# Patient Record
Sex: MALE | Race: WHITE | NOT HISPANIC OR LATINO | Employment: FULL TIME | URBAN - METROPOLITAN AREA
[De-identification: names, ages, dates, MRNs, and addresses within clinical notes are randomized per-mention and may not be internally consistent; named-entity substitution may affect disease eponyms.]

---

## 2017-07-19 ENCOUNTER — OFFICE VISIT (OUTPATIENT)
Dept: FAMILY MEDICINE | Facility: CLINIC | Age: 47
End: 2017-07-19
Payer: COMMERCIAL

## 2017-07-19 ENCOUNTER — TELEPHONE (OUTPATIENT)
Dept: FAMILY MEDICINE | Facility: CLINIC | Age: 47
End: 2017-07-19

## 2017-07-19 ENCOUNTER — RADIANT APPOINTMENT (OUTPATIENT)
Dept: GENERAL RADIOLOGY | Facility: CLINIC | Age: 47
End: 2017-07-19
Attending: FAMILY MEDICINE
Payer: COMMERCIAL

## 2017-07-19 VITALS
HEART RATE: 72 BPM | BODY MASS INDEX: 35.35 KG/M2 | WEIGHT: 261 LBS | SYSTOLIC BLOOD PRESSURE: 150 MMHG | DIASTOLIC BLOOD PRESSURE: 95 MMHG | TEMPERATURE: 99.1 F | HEIGHT: 72 IN | OXYGEN SATURATION: 99 %

## 2017-07-19 DIAGNOSIS — E66.01 MORBID OBESITY DUE TO EXCESS CALORIES (H): ICD-10-CM

## 2017-07-19 DIAGNOSIS — I10 ESSENTIAL HYPERTENSION: Primary | ICD-10-CM

## 2017-07-19 DIAGNOSIS — J98.01 BRONCHOSPASM: ICD-10-CM

## 2017-07-19 DIAGNOSIS — J45.30 MILD PERSISTENT ASTHMA WITHOUT COMPLICATION: ICD-10-CM

## 2017-07-19 DIAGNOSIS — R73.9 HYPERGLYCEMIA: ICD-10-CM

## 2017-07-19 LAB
ANION GAP SERPL CALCULATED.3IONS-SCNC: 6 MMOL/L (ref 3–14)
BUN SERPL-MCNC: 25 MG/DL (ref 7–30)
CALCIUM SERPL-MCNC: 8.9 MG/DL (ref 8.5–10.1)
CHLORIDE SERPL-SCNC: 104 MMOL/L (ref 94–109)
CO2 SERPL-SCNC: 27 MMOL/L (ref 20–32)
CREAT SERPL-MCNC: 0.93 MG/DL (ref 0.66–1.25)
GFR SERPL CREATININE-BSD FRML MDRD: 86 ML/MIN/1.7M2
GLUCOSE SERPL-MCNC: 133 MG/DL (ref 70–99)
POTASSIUM SERPL-SCNC: 3.9 MMOL/L (ref 3.4–5.3)
SODIUM SERPL-SCNC: 137 MMOL/L (ref 133–144)

## 2017-07-19 PROCEDURE — 71020 XR CHEST 2 VW: CPT

## 2017-07-19 PROCEDURE — 36415 COLL VENOUS BLD VENIPUNCTURE: CPT | Performed by: FAMILY MEDICINE

## 2017-07-19 PROCEDURE — 99214 OFFICE O/P EST MOD 30 MIN: CPT | Performed by: FAMILY MEDICINE

## 2017-07-19 PROCEDURE — 80048 BASIC METABOLIC PNL TOTAL CA: CPT | Performed by: FAMILY MEDICINE

## 2017-07-19 RX ORDER — LOSARTAN POTASSIUM 50 MG/1
50 TABLET ORAL DAILY
Qty: 90 TABLET | Refills: 1 | Status: SHIPPED | OUTPATIENT
Start: 2017-07-19 | End: 2017-08-22

## 2017-07-19 RX ORDER — ALBUTEROL SULFATE 90 UG/1
2 AEROSOL, METERED RESPIRATORY (INHALATION) EVERY 4 HOURS PRN
Qty: 1 INHALER | Refills: 3 | Status: SHIPPED | OUTPATIENT
Start: 2017-07-19 | End: 2019-01-11

## 2017-07-19 RX ORDER — PREDNISONE 10 MG/1
TABLET ORAL
Qty: 21 TABLET | Refills: 0 | Status: SHIPPED | OUTPATIENT
Start: 2017-07-19 | End: 2017-08-22

## 2017-07-19 NOTE — LETTER
Ilya Villagran  [address] [facility] [address]        July 21, 2017          Dear ,    We are writing to inform you of your test results.    normal kidney function however his glucose was elevated.   When I see him in follow up I will be checking a hemoglobin a1c checking for prediabetes/diabetes.  If he wants to get lab done before hand, I will order this up as future, just let us know.    Resulted Orders   Basic metabolic panel   Result Value Ref Range    Sodium 137 133 - 144 mmol/L    Potassium 3.9 3.4 - 5.3 mmol/L    Chloride 104 94 - 109 mmol/L    Carbon Dioxide 27 20 - 32 mmol/L    Anion Gap 6 3 - 14 mmol/L    Glucose 133 (H) 70 - 99 mg/dL    Urea Nitrogen 25 7 - 30 mg/dL    Creatinine 0.93 0.66 - 1.25 mg/dL    GFR Estimate 86 >60 mL/min/1.7m2      Comment:      Non  GFR Calc    GFR Estimate If Black >90   GFR Calc   >60 mL/min/1.7m2    Calcium 8.9 8.5 - 10.1 mg/dL       Thank you very much for choosing [facility]. Please call my office if you have any questions or concerns.       Sincerely,        Jasson Jacobo MD

## 2017-07-19 NOTE — PROGRESS NOTES
"  SUBJECTIVE:                                                    Ilya Villagarn is a 46 year old male who presents to clinic today for the following health issues:  Chief Complaint   Patient presents with     Breathing Problem     2 months ago had a cold, now is wheezing. Cough with some phlegn, some rhinorrhea     Hypertension     med refill. Has been out of b/p meds fro 6 months         Hypertension Follow-up      Outpatient blood pressures are not being checked.    Low Salt Diet: no added salt    RESPIRATORY SYMPTOMS      Duration: 2 months    Description  cough and wheezing, rhinorrhea    Severity: moderate    Accompanying signs and symptoms: coughing spasms    History (predisposing factors):  Has had testing at the [facility]    Precipitating or alleviating factors: Had a cold    Therapies tried and outcome:  Guaifenesin, dayquil, nightquil. Albuterol        Amount of exercise or physical activity: stays active    Problems taking medications regularly: ran out    Medication side effects: none  Diet: regular (no restrictions)          Problem list and histories reviewed & adjusted, as indicated.  Additional history: as documented        Reviewed and updated as needed this visit by clinical staffTobacco  Meds  Med Hx  Surg Hx  Fam Hx  Soc Hx      Reviewed and updated as needed this visit by Provider         ROS:  CONSTITUTIONAL:NEGATIVE for fever, chills, change in weight  INTEGUMENTARY/SKIN: NEGATIVE for worrisome rashes, moles or lesions  ENT/MOUTH: NEGATIVE for ear, mouth and throat problems  RESP:as above  CV: NEGATIVE for chest pain, palpitations or peripheral edema  MUSCULOSKELETAL: NEGATIVE for significant arthralgias or myalgia  NEURO: NEGATIVE for weakness, dizziness or paresthesias  PSYCHIATRIC: NEGATIVE for changes in mood or affect    OBJECTIVE:                                                    BP (!) 150/95 (Cuff Size: Adult Large)  Pulse 72  Temp 99.1  F (37.3  C) (Tympanic)  Ht 6' 0.25\" " (1.835 m)  Wt 261 lb (118.4 kg)  SpO2 99%   L/min  BMI 35.15 kg/m2  Body mass index is 35.15 kg/(m^2).  GENERAL APPEARANCE: alert, no distress and cooperative  HENT: ear canals and TM's normal and nose and mouth without ulcers or lesions  NECK: no adenopathy, no asymmetry, masses, or scars and thyroid normal to palpation  RESP: decrease lung sounds, end expiratory wheeze noted predicted peak flow is 637.  CV: regular rates and rhythm, normal S1 S2, no S3 or S4 and no murmur, click or rub  MS: extremities normal- no gross deformities noted  SKIN: no suspicious lesions or rashes  NEURO: Normal strength and tone, mentation intact and speech normal  PSYCH: mentation appears normal and affect normal/bright    I have personally reviewed the xray and my interpretation is the following:  CXR is clear       ASSESSMENT/PLAN:                                                    1. Essential hypertension  Not controlled, off med, get lab, and restart medication however starting at lower level of 50 mg a day  - Basic metabolic panel  - losartan (COZAAR) 50 MG tablet; Take 1 tablet (50 mg) by mouth daily  Dispense: 90 tablet; Refill: 1  Also stay active due to weight.  2. Bronchospasm  Has asthma, use oral steroids, advair and albuterol inhaler.  Instructions are given.  - XR Chest 2 Views; Future  - predniSONE (DELTASONE) 10 MG tablet; Take 4 tabs PO Q daily X 2 days, 3 tabs X 2 days, 2 tabs X 2 days, 1 tab X 2 days, 1/2 tab X 2 days.  Dispense: 21 tablet; Refill: 0  - fluticasone-salmeterol (ADVAIR) 250-50 MCG/DOSE diskus inhaler; Inhale 1 puff into the lungs 2 times daily  Dispense: 1 Inhaler; Refill: 11    3. Mild persistent asthma without complication  Instructed and handouts given  - albuterol (PROAIR HFA/PROVENTIL HFA/VENTOLIN HFA) 108 (90 BASE) MCG/ACT Inhaler; Inhale 2 puffs into the lungs every 4 hours as needed for shortness of breath / dyspnea  Dispense: 1 Inhaler; Refill: 3      See Patient  Instructions    Jasson Jacobo MD  [facility]

## 2017-07-19 NOTE — PATIENT INSTRUCTIONS
Please go to lab.    I am checking your kidney function due to high blood pressure.    For your breathing, I am thinking you have asthma.  You have cough, wheezing and decrease airflow.  Your peak flow is also down.  You should be blowing out 637 and you are doing only 500.    I am putting you on a burst and taper of oral prednisone.  I am staring you off on a steroid inhaler. Please use flovent 110 mcg taking 2 puffs twice a day.    Also use the albuterol inhaler as a rescue inhaler. 2 puffs every 4 hours as needed for shortness of breath and wheezing.    Follow up in 10 days.          Thank you for choosing [facility].  You may be receiving a survey in the mail from [employer] regarding your visit today.  Please take a few minutes to complete and return the survey to let us know how we are doing.      If you have questions or concerns, please contact us via [employer] or you can contact your care team at [phone].    Our Clinic hours are:  Monday 6:40 am  to 7:00 pm  Tuesday -Friday 6:40 am to 5:00 pm    The [place] outpatient lab hours are:  Monday - Friday 6:10 am to 4:45 pm  Saturdays 7:00 am to 11:00 am  Appointments are required, call [phone]    If you have clinical questions after hours or would like to schedule an appointment,  call the clinic at [phone].

## 2017-07-19 NOTE — MR AVS SNAPSHOT
After Visit Summary   7/19/2017    Ilya Villagran    MRN: [id number]           Patient Information     Date Of Birth          1970        Visit Information        Provider Department      7/19/2017 3:20 PM Jasson Jacobo MD [facility]        Today's Diagnoses     Essential hypertension    -  1    Bronchospasm        Mild persistent asthma without complication          Care Instructions    Please go to lab.    I am checking your kidney function due to high blood pressure.    For your breathing, I am thinking you have asthma.  You have cough, wheezing and decrease airflow.  Your peak flow is also down.  You should be blowing out 637 and you are doing only 500.    I am putting you on a burst and taper of oral prednisone.  I am staring you off on a steroid inhaler. Please use flovent 110 mcg taking 2 puffs twice a day.    Also use the albuterol inhaler as a rescue inhaler. 2 puffs every 4 hours as needed for shortness of breath and wheezing.    Follow up in 10 days.          Thank you for choosing [facility].  You may be receiving a survey in the mail from [employer] regarding your visit today.  Please take a few minutes to complete and return the survey to let us know how we are doing.      If you have questions or concerns, please contact us via [employer] or you can contact your care team at [phone].    Our Clinic hours are:  Monday 6:40 am  to 7:00 pm  Tuesday -Friday 6:40 am to 5:00 pm    The [place] outpatient lab hours are:  Monday - Friday 6:10 am to 4:45 pm  Saturdays 7:00 am to 11:00 am  Appointments are required, call [phone]    If you have clinical questions after hours or would like to schedule an appointment,  call the clinic at [phone].            Follow-ups after your visit        Follow-up notes from your care team     Return in about 10 days (around 7/29/2017).      Who to contact     If you have questions or need follow up information about  "today's clinic visit or your schedule please contact [facility] directly at [phone].  Normal or non-critical lab and imaging results will be communicated to you by MyChart, letter or phone within 4 business days after the clinic has received the results. If you do not hear from us within 7 days, please contact the clinic through [employer] or phone. If you have a critical or abnormal lab result, we will notify you by phone as soon as possible.  Submit refill requests through [employer] or call your pharmacy and they will forward the refill request to us. Please allow 3 business days for your refill to be completed.          Additional Information About Your Visit        [employer] Information     [employer] lets you send messages to your doctor, view your test results, renew your prescriptions, schedule appointments and more. To sign up, go to [email or web address] . Click on \"Log in\" on the left side of the screen, which will take you to the Welcome page. Then click on \"Sign up Now\" on the right side of the page.     You will be asked to enter the access code listed below, as well as some personal information. Please follow the directions to create your username and password.     Your access code is: [email or web address]  Expires: 10/17/2017  4:11 PM     Your access code will  in 90 days. If you need help or a new code, please call your [place] clinic or [phone].        Care EveryWhere ID     This is your Care EveryWhere ID. This could be used by other organizations to access your [place] medical records  [id number]        Your Vitals Were     Pulse Temperature Height Pulse Oximetry Peak Flow BMI (Body Mass Index)    72 99.1  F (37.3  C) (Tympanic) 6' 0.25\" (1.835 m) 99% 500 L/min 35.15 kg/m2       Blood Pressure from Last 3 Encounters:   17 (!) 150/95   16 158/88   08/27/15 141/82    Weight from Last 3 Encounters:   17 261 lb (118.4 kg)   16 265 lb (120.2 kg)   08/27/15 266 " lb (120.7 kg)              We Performed the Following     Basic metabolic panel          Today's Medication Changes          These changes are accurate as of: 7/19/17  4:11 PM.  If you have any questions, ask your nurse or doctor.               Start taking these medicines.        Dose/Directions    fluticasone-salmeterol 250-50 MCG/DOSE diskus inhaler   Commonly known as:  ADVAIR   Used for:  Bronchospasm   Started by:  Jasson Jacobo MD        Dose:  1 puff   Inhale 1 puff into the lungs 2 times daily   Quantity:  1 Inhaler   Refills:  11       losartan 50 MG tablet   Commonly known as:  COZAAR   Used for:  Essential hypertension   Started by:  Jasson Jacobo MD        Dose:  50 mg   Take 1 tablet (50 mg) by mouth daily   Quantity:  90 tablet   Refills:  1       predniSONE 10 MG tablet   Commonly known as:  DELTASONE   Used for:  Bronchospasm   Started by:  Jasson Jacobo MD        Take 4 tabs PO Q daily X 2 days, 3 tabs X 2 days, 2 tabs X 2 days, 1 tab X 2 days, 1/2 tab X 2 days.   Quantity:  21 tablet   Refills:  0         These medicines have changed or have updated prescriptions.        Dose/Directions    * albuterol 108 (90 BASE) MCG/ACT Inhaler   Commonly known as:  PROAIR HFA/PROVENTIL HFA/VENTOLIN HFA   This may have changed:  Another medication with the same name was added. Make sure you understand how and when to take each.   Used for:  Bronchospasm   Changed by:  Carin Pepe DO        Dose:  2 puff   Inhale 2 puffs into the lungs every 6 hours as needed for shortness of breath / dyspnea or wheezing   Quantity:  1 Inhaler   Refills:  3       * albuterol 108 (90 BASE) MCG/ACT Inhaler   Commonly known as:  PROAIR HFA/PROVENTIL HFA/VENTOLIN HFA   This may have changed:  You were already taking a medication with the same name, and this prescription was added. Make sure you understand how and when to take each.   Used for:  Mild persistent asthma without complication   Changed by:   Jasson Jacobo MD        Dose:  2 puff   Inhale 2 puffs into the lungs every 4 hours as needed for shortness of breath / dyspnea   Quantity:  1 Inhaler   Refills:  3       * Notice:  This list has 2 medication(s) that are the same as other medications prescribed for you. Read the directions carefully, and ask your doctor or other care provider to review them with you.      Stop taking these medicines if you haven't already. Please contact your care team if you have questions.     NO ACTIVE MEDICATIONS   Stopped by:  Jasson Jacobo MD           UNKNOWN TO PATIENT   Stopped by:  Jasson Jacobo MD                Where to get your medicines      These medications were sent to [facility] [address] [facility]  [address] [place] [address]     Phone:  [phone]     albuterol 108 (90 BASE) MCG/ACT Inhaler    fluticasone-salmeterol 250-50 MCG/DOSE diskus inhaler    predniSONE 10 MG tablet         These medications were sent to [facility] [address] [place] [address] [address] [facility] [address]  [address], [place] [address]     Phone:  [phone]     losartan 50 MG tablet                Primary Care Provider Office Phone # Fax #    Carin Lee DO Afshin [phone]       [facility] [address] [facility] [address]        Equal Access to Services     ADI PLATT AH: Hadii aad ku hadasho Soomaali, waaxda luqadaha, qaybta kaalmada adeegyada, william tirado. So [facility] [phone].    ATENCIÓN: Si habla español, tiene a smith disposición servicios gratuitos de asistencia lingüística. Llame al [phone].    We comply with applicable federal civil rights laws and [place] laws. We do not discriminate on the basis of race, color, national origin, age, disability sex, sexual orientation or gender identity.            Thank you!     Thank you for choosing [facility]   for your care. Our goal is always to provide you with excellent care. Hearing back from our patients is one way we can continue to improve our services. Please take a few minutes to complete the written survey that you may receive in the mail after your visit with us. Thank you!             Your Updated Medication List - Protect others around you: Learn how to safely use, store and throw away your medicines at www.disposemymeds.org.          This list is accurate as of: 7/19/17  4:11 PM.  Always use your most recent med list.                   Brand Name Dispense Instructions for use Diagnosis    * albuterol 108 (90 BASE) MCG/ACT Inhaler    PROAIR HFA/PROVENTIL HFA/VENTOLIN HFA    1 Inhaler    Inhale 2 puffs into the lungs every 6 hours as needed for shortness of breath / dyspnea or wheezing    Bronchospasm       * albuterol 108 (90 BASE) MCG/ACT Inhaler    PROAIR HFA/PROVENTIL HFA/VENTOLIN HFA    1 Inhaler    Inhale 2 puffs into the lungs every 4 hours as needed for shortness of breath / dyspnea    Mild persistent asthma without complication       fluticasone-salmeterol 250-50 MCG/DOSE diskus inhaler    ADVAIR    1 Inhaler    Inhale 1 puff into the lungs 2 times daily    Bronchospasm       losartan 50 MG tablet    COZAAR    90 tablet    Take 1 tablet (50 mg) by mouth daily    Essential hypertension       predniSONE 10 MG tablet    DELTASONE    21 tablet    Take 4 tabs PO Q daily X 2 days, 3 tabs X 2 days, 2 tabs X 2 days, 1 tab X 2 days, 1/2 tab X 2 days.    Bronchospasm       * Notice:  This list has 2 medication(s) that are the same as other medications prescribed for you. Read the directions carefully, and ask your doctor or other care provider to review them with you.

## 2017-07-19 NOTE — NURSING NOTE
"Chief Complaint   Patient presents with     Breathing Problem     2 months ago had a cold, now is wheezing. Cough with some phlegn, some rhinorrhea     Hypertension     med refill       Initial BP (!) 168/102 (Cuff Size: Adult Large)  Pulse 72  Temp 99.1  F (37.3  C) (Tympanic)  Ht 6' 0.25\" (1.835 m)  Wt 261 lb (118.4 kg)  SpO2 99%   L/min  BMI 35.15 kg/m2 Estimated body mass index is 35.15 kg/(m^2) as calculated from the following:    Height as of this encounter: 6' 0.25\" (1.835 m).    Weight as of this encounter: 261 lb (118.4 kg).  Medication Reconciliation: complete  "

## 2017-07-20 PROBLEM — E66.01 MORBID OBESITY DUE TO EXCESS CALORIES (H): Status: ACTIVE | Noted: 2017-07-20

## 2017-07-20 PROBLEM — R73.9 HYPERGLYCEMIA: Status: ACTIVE | Noted: 2017-07-20

## 2017-07-20 ASSESSMENT — ASTHMA QUESTIONNAIRES: ACT_TOTALSCORE: 14

## 2017-07-24 PROBLEM — J45.30 MILD PERSISTENT ASTHMA WITHOUT COMPLICATION: Status: ACTIVE | Noted: 2017-07-24

## 2017-08-16 ENCOUNTER — TELEPHONE (OUTPATIENT)
Dept: FAMILY MEDICINE | Facility: CLINIC | Age: 47
End: 2017-08-16

## 2017-08-16 NOTE — TELEPHONE ENCOUNTER
Chief Complaint   Patient presents with     Panel Management     Asthma (ACT was 14 on 7/19/17) and B/P was elevated at 7/19/17, visit needs recheck. He is also to follow up a high glucose with a Hemoglobin A1C, which he has not done yet.     Needs follow up appt for the above problems.     Left message on answer machine to return call to clinic.    I have held the Tues August 22 11:20 slot for this patient to have. Please schedule if he calls back.    KIMBERLY Hardy ([facility])

## 2017-08-17 ENCOUNTER — TELEPHONE (OUTPATIENT)
Dept: FAMILY MEDICINE | Facility: CLINIC | Age: 47
End: 2017-08-17

## 2017-08-22 ENCOUNTER — OFFICE VISIT (OUTPATIENT)
Dept: FAMILY MEDICINE | Facility: CLINIC | Age: 47
End: 2017-08-22
Payer: COMMERCIAL

## 2017-08-22 DIAGNOSIS — J45.30 MILD PERSISTENT ASTHMA WITHOUT COMPLICATION: Primary | ICD-10-CM

## 2017-08-22 DIAGNOSIS — I10 ESSENTIAL HYPERTENSION: ICD-10-CM

## 2017-08-22 DIAGNOSIS — Z13.6 CARDIOVASCULAR SCREENING; LDL GOAL LESS THAN 130: ICD-10-CM

## 2017-08-22 DIAGNOSIS — R73.9 HYPERGLYCEMIA: ICD-10-CM

## 2017-08-22 LAB — HBA1C MFR BLD: 6 % (ref 4.3–6)

## 2017-08-22 PROCEDURE — 99214 OFFICE O/P EST MOD 30 MIN: CPT | Performed by: FAMILY MEDICINE

## 2017-08-22 PROCEDURE — 36415 COLL VENOUS BLD VENIPUNCTURE: CPT | Performed by: FAMILY MEDICINE

## 2017-08-22 PROCEDURE — 83036 HEMOGLOBIN GLYCOSYLATED A1C: CPT | Performed by: FAMILY MEDICINE

## 2017-08-22 RX ORDER — LOSARTAN POTASSIUM 50 MG/1
75 TABLET ORAL DAILY
Qty: 135 TABLET | Refills: 3 | Status: SHIPPED | OUTPATIENT
Start: 2017-08-22 | End: 2018-08-23

## 2017-08-22 NOTE — PROGRESS NOTES
"  SUBJECTIVE:   Ilya Villagran is a 46 year old male who presents to clinic today for the following health issues:  Chief Complaint   Patient presents with     Asthma     follow up, feels no better, still has an \"itch\" in his breathing.  Advair is not helping at all. Initially the prednisone did help. Albuterol does help the wheezing. Nyquil seems to take the \"itch\" away.     Hypertension     recheck     Lab Only     had high blood sugar, needs A1C     Patient is still feeling the sensation of an itch in his chest area and throat.  The oral steroid helped but symptoms came back.  He also tried the steroid inhaler but it did not seem to help.  He states that nyquil at night helps him sleep and get rid of the sensation.  This was going on before he started the ARB medication.    Blood pressure is not controlled, on medication.  Started at a lower dose, discussed it may need to be titrated.  No side effect of his medication.      Hypertension Follow-up      Outpatient blood pressures are not being checked.    Low Salt Diet: not monitoring salt    Asthma Follow-Up    Was ACT completed today?    Yes    ACT Total Scores 8/22/2017   ACT TOTAL SCORE (Goal Greater than or Equal to 20) 18   In the past 12 months, how many times did you visit the emergency room for your asthma without being admitted to the hospital? 0   In the past 12 months, how many times were you hospitalized overnight because of your asthma? 0       Recent asthma triggers that patient is dealing with: at work, fumes        Amount of exercise or physical activity: 2-3 days/week fProblems taking medications regularly: No    Medication side effects: none  Diet: regular (no restrictions)          Problem list and histories reviewed & adjusted, as indicated.  Additional history: as documented        Reviewed and updated as needed this visit by clinical staffAllergies  Meds  Problems       Reviewed and updated as needed this visit by Provider  Allergies  Meds " " Problems         ROS:  CONSTITUTIONAL:NEGATIVE for fever, chills, change in weight  EYES: NEGATIVE for vision changes or irritation  ENT/MOUTH: NEGATIVE for ear, mouth and throat problems  RESP:still has similar sensation, got better then worse again after oral steroid completed  CV: NEGATIVE for chest pain, palpitations or peripheral edema  MUSCULOSKELETAL: NEGATIVE for significant arthralgias or myalgia  NEURO: NEGATIVE for weakness, dizziness or paresthesias  PSYCHIATRIC: NEGATIVE for changes in mood or affect    OBJECTIVE:                                                    /84 (Cuff Size: Adult Large)  Temp 98.8  F (37.1  C) (Tympanic)  Ht 6' 0.25\" (1.835 m)  Wt 262 lb (118.8 kg)  SpO2 97%   L/min  BMI 35.29 kg/m2  Body mass index is 35.29 kg/(m^2).  GENERAL APPEARANCE: alert, no distress and cooperative  RESP: lungs clear to auscultation - no rales, rhonchi or wheezes  CV: regular rates and rhythm, normal S1 S2, no S3 or S4 and no murmur, click or rub  SKIN: no suspicious lesions or rashes  NEURO: Normal strength and tone, mentation intact and speech normal  PSYCH: mentation appears normal and affect normal/bright         ASSESSMENT/PLAN:                                                    1. Mild persistent asthma without complication  Seems to have itch as symptom, will get allergy/asthma consult due to ongoing symptoms even with advair  - ALLERGY/ASTHMA ADULT REFERRAL    2. Essential hypertension  controlled  - losartan (COZAAR) 50 MG tablet; Take 1.5 tablets (75 mg) by mouth daily This is a new dose as of today.  Dispense: 135 tablet; Refill: 3    3. CARDIOVASCULAR SCREENING; LDL GOAL LESS THAN 130    - Lipid panel reflex to direct LDL; Future    4. Hyperglycemia    - Hemoglobin A1c      See Patient Instructions    Jasson Jacobo MD  [facility]  "

## 2017-08-22 NOTE — PATIENT INSTRUCTIONS
Please go to lab.    Please make a fasting lab visit in the future.    Please the asthma/allergy specialist here.    Please increase your losartan medication to 75 mg a day which is 1.5 tablets of the 50 mg dose.    Please make sure [facility] fills for the correct dose and quantity.          Thank you for choosing [facility].  You may be receiving a survey in the mail from "[employer] [facility] regarding your visit today.  Please take a few minutes to complete and return the survey to let us know how we are doing.      If you have questions or concerns, please contact us via [employer] or you can contact your care team at [phone].    Our Clinic hours are:  Monday 6:40 am  to 7:00 pm  Tuesday -Friday 6:40 am to 5:00 pm    The [place] outpatient lab hours are:  Monday - Friday 6:10 am to 4:45 pm  Saturdays 7:00 am to 11:00 am  Appointments are required, call [phone]    If you have clinical questions after hours or would like to schedule an appointment,  call the clinic at [phone].

## 2017-08-22 NOTE — LETTER
August 28, 2017      Ilya Villagran  [address] [facility] [address]        Dear ,    We are writing to inform you of your test results.  His hemoglobin a1c is in the prediabetic range.  Please give him information on prediabetic classes ( this info is enclosed).  This is the time to learn how to eat and what not to eat to help prevent diabetes.  If you have any questions or concerns, please call the clinic at the number listed above.     Component      Latest Ref Rng & Units 8/22/2017   Hemoglobin A1C      4.3 - 6.0 % 6.0     Sincerely,        Jasson Jacobo MD

## 2017-08-22 NOTE — MR AVS SNAPSHOT
After Visit Summary   8/22/2017    Ilya Villagran    MRN: [id number]           Patient Information     Date Of Birth          1970        Visit Information        Provider Department      8/22/2017 11:20 AM Jasson Jacobo MD [facility]        Today's Diagnoses     Mild persistent asthma without complication    -  1    Essential hypertension        CARDIOVASCULAR SCREENING; LDL GOAL LESS THAN 130        Hyperglycemia          Care Instructions    Please go to lab.    Please make a fasting lab visit in the future.    Please the asthma/allergy specialist here.    Please increase your losartan medication to 75 mg a day which is 1.5 tablets of the 50 mg dose.    Please make sure [facility] fills for the correct dose and quantity.          Thank you for choosing [facility].  You may be receiving a survey in the mail from [employer] regarding your visit today.  Please take a few minutes to complete and return the survey to let us know how we are doing.      If you have questions or concerns, please contact us via [facility] or you can contact your care team at [phone].    Our Clinic hours are:  Monday 6:40 am  to 7:00 pm  Tuesday -Friday 6:40 am to 5:00 pm    The [place] outpatient lab hours are:  Monday - Friday 6:10 am to 4:45 pm  Saturdays 7:00 am to 11:00 am  Appointments are required, call [phone]    If you have clinical questions after hours or would like to schedule an appointment,  call the clinic at [phone].            Follow-ups after your visit        Additional Services     ALLERGY/ASTHMA ADULT REFERRAL       Your provider has referred you to: [facility]: [facility] [phone] [email or web address]    Please be aware that coverage of these services is subject to the terms and limitations of your health insurance plan.  Call member services at your health plan with any benefit or coverage questions.      Please bring  the following with you to your appointment:    (1) Any X-Rays, CTs or MRIs which have been performed.  Contact the facility where they were done to arrange for  prior to your scheduled appointment.    (2) List of current medications  (3) This referral request   (4) Any documents/labs given to you for this referral    Patient has 'itch' in his throat, substernal.  His predicted peak flow is around 640, first time I saw him it was 500, after advair/albuterol it is about 540.  Still having symptoms.  He does not think advair is working.  Please consult eval and treat.                  Your next 10 appointments already scheduled     Aug 25, 2017 11:00 AM CDT   Office Visit with [facility]ass[facility] MD   [facility]    [address] [address] [phone]           Bring a current list of meds and any records pertaining to this visit. For Physicals, please bring immunization records and any forms needing to be filled out. Please arrive 10 minutes early to complete paperwork.              Future tests that were ordered for you today     Open Future Orders        Priority Expected Expires Ordered    Lipid panel reflex to direct LDL Routine  9/22/2017 8/22/2017            Who to contact     If you have questions or need follow up information about today's clinic visit or your schedule please contact [facility] directly at [phone].  Normal or non-critical lab and imaging results will be communicated to you by MyChart, letter or phone within 4 business days after the clinic has received the results. If you do not hear from us within 7 days, please contact the clinic through MyChart or phone. If you have a critical or abnormal lab result, we will notify you by phone as soon as possible.  Submit refill requests through [employer] or call your pharmacy and they will forward the refill request to us. Please allow 3 business days for your refill  "to be completed.          Additional Information About Your Visit        [employer] Information     [employer] lets you send messages to your doctor, view your test results, renew your prescriptions, schedule appointments and more. To sign up, go to [email or web address] . Click on \"Log in\" on the left side of the screen, which will take you to the Welcome page. Then click on \"Sign up Now\" on the right side of the page.     You will be asked to enter the access code listed below, as well as some personal information. Please follow the directions to create your username and password.     Your access code is: [email or web address]  Expires: 10/17/2017  4:11 PM     Your access code will  in 90 days. If you need help or a new code, please call your [place] clinic or [phone].        Care EveryWhere ID     This is your Care EveryWhere ID. This could be used by other organizations to access your [place] medical records  [id number]        Your Vitals Were     Temperature Height Pulse Oximetry Peak Flow BMI (Body Mass Index)       98.8  F (37.1  C) (Tympanic) 6' 0.25\" (1.835 m) 97% 540 L/min 35.29 kg/m2        Blood Pressure from Last 3 Encounters:   17 148/84   17 (!) 150/95   16 158/88    Weight from Last 3 Encounters:   17 262 lb (118.8 kg)   17 261 lb (118.4 kg)   16 265 lb (120.2 kg)              We Performed the Following     ALLERGY/ASTHMA ADULT REFERRAL     Hemoglobin A1c          Today's Medication Changes          These changes are accurate as of: 17 12:13 PM.  If you have any questions, ask your nurse or doctor.               These medicines have changed or have updated prescriptions.        Dose/Directions    losartan 50 MG tablet   Commonly known as:  COZAAR   This may have changed:    - how much to take  - additional instructions   Used for:  Essential hypertension        Dose:  75 mg   Take 1.5 tablets (75 mg) by mouth daily This is a new dose as of today.   Quantity: "  135 tablet   Refills:  3            Where to get your medicines      These medications were sent to [facility]  [place], [place] - [address] AT [facility] [address] [place] [address]     Phone:  [phone]     losartan 50 MG tablet                Primary Care Provider Office Phone # Fax #    Carin Pepe,  [phone] [address] [facility] [address]        Equal Access to Services     [facility] GIULIA : Hadii aad ku hadasho Soomaali, waaxda luqadaha, qaybta kaalmada adeegyada, waxay liin hayaan adeavila diamond . So [facility] [phone].    ATENCIÓN: Si habla español, tiene a smith disposición servicios gratuitos de asistencia lingüística. [facility] [phone].    We comply with applicable federal civil rights laws and [place] laws. We do not discriminate on the basis of race, color, national origin, age, disability sex, sexual orientation or gender identity.            Thank you!     Thank you for choosing [facility]  for your care. Our goal is always to provide you with excellent care. Hearing back from our patients is one way we can continue to improve our services. Please take a few minutes to complete the written survey that you may receive in the mail after your visit with us. Thank you!             Your Updated Medication List - Protect others around you: Learn how to safely use, store and throw away your medicines at www.disposemymeds.org.          This list is accurate as of: 8/22/17 12:13 PM.  Always use your most recent med list.                   Brand Name Dispense Instructions for use Diagnosis    albuterol 108 (90 BASE) MCG/ACT Inhaler    PROAIR HFA/PROVENTIL HFA/VENTOLIN HFA    1 Inhaler    Inhale 2 puffs into the lungs every 4 hours as needed for shortness of breath / dyspnea    Mild persistent asthma without complication       fluticasone-salmeterol 250-50 MCG/DOSE diskus inhaler    ADVAIR     1 Inhaler    Inhale 1 puff into the lungs 2 times daily    Bronchospasm       losartan 50 MG tablet    COZAAR    135 tablet    Take 1.5 tablets (75 mg) by mouth daily This is a new dose as of today.    Essential hypertension

## 2017-08-23 ASSESSMENT — ASTHMA QUESTIONNAIRES: ACT_TOTALSCORE: 18

## 2017-08-25 VITALS
OXYGEN SATURATION: 97 % | SYSTOLIC BLOOD PRESSURE: 148 MMHG | HEIGHT: 72 IN | BODY MASS INDEX: 35.49 KG/M2 | TEMPERATURE: 98.8 F | DIASTOLIC BLOOD PRESSURE: 84 MMHG | WEIGHT: 262 LBS

## 2017-08-28 ENCOUNTER — OFFICE VISIT (OUTPATIENT)
Dept: ALLERGY | Facility: CLINIC | Age: 47
End: 2017-08-28
Payer: COMMERCIAL

## 2017-08-28 VITALS
TEMPERATURE: 97.5 F | HEART RATE: 87 BPM | WEIGHT: 259.26 LBS | DIASTOLIC BLOOD PRESSURE: 95 MMHG | SYSTOLIC BLOOD PRESSURE: 134 MMHG | OXYGEN SATURATION: 98 % | BODY MASS INDEX: 34.92 KG/M2

## 2017-08-28 DIAGNOSIS — R06.2 WHEEZING: Primary | ICD-10-CM

## 2017-08-28 DIAGNOSIS — R05.9 COUGH: ICD-10-CM

## 2017-08-28 LAB
FEF 25/75: NORMAL
FEV-1: NORMAL
FEV1/FVC: NORMAL
FVC: NORMAL

## 2017-08-28 PROCEDURE — 94010 BREATHING CAPACITY TEST: CPT | Performed by: ALLERGY & IMMUNOLOGY

## 2017-08-28 PROCEDURE — 95004 PERQ TESTS W/ALRGNC XTRCS: CPT | Performed by: ALLERGY & IMMUNOLOGY

## 2017-08-28 PROCEDURE — 99244 OFF/OP CNSLTJ NEW/EST MOD 40: CPT | Mod: 25 | Performed by: ALLERGY & IMMUNOLOGY

## 2017-08-28 RX ORDER — AZELASTINE 1 MG/ML
2 SPRAY, METERED NASAL 2 TIMES DAILY PRN
Qty: 30 ML | Refills: 3 | Status: SHIPPED | OUTPATIENT
Start: 2017-08-28 | End: 2020-11-30

## 2017-08-28 ASSESSMENT — ENCOUNTER SYMPTOMS
WHEEZING: 1
EYE REDNESS: 0
RHINORRHEA: 0
MYALGIAS: 0
STRIDOR: 0
SINUS PRESSURE: 0
ADENOPATHY: 0
CHEST TIGHTNESS: 1
VOMITING: 0
FATIGUE: 0
EYE DISCHARGE: 0
FEVER: 0
HEADACHES: 0
NAUSEA: 0
ROS SKIN COMMENTS: ECZEMA
EYE ITCHING: 0
FACIAL SWELLING: 0
DIARRHEA: 0
SHORTNESS OF BREATH: 1
ACTIVITY CHANGE: 0
COUGH: 1

## 2017-08-28 NOTE — PATIENT INSTRUCTIONS
-Use azelastine 2 sprays in each nostril twice a day when necessary.    Call to schedule methacholine challenge test:      [phone]

## 2017-08-28 NOTE — NURSING NOTE
"Chief Complaint   Patient presents with     Asthma Consult     ref- Dr. Jacobo       Initial BP (!) 134/95 (BP Location: Left arm, Patient Position: Sitting, Cuff Size: Adult Large)  Pulse 87  Temp 97.5  F (36.4  C) (Oral)  Wt 259 lb 4.2 oz (117.6 kg)  SpO2 98%  BMI 34.92 kg/m2 Estimated body mass index is 34.92 kg/(m^2) as calculated from the following:    Height as of 8/22/17: 6' 0.25\" (1.835 m).    Weight as of this encounter: 259 lb 4.2 oz (117.6 kg).  Medication Reconciliation: complete    "

## 2017-08-28 NOTE — MR AVS SNAPSHOT
"              After Visit Summary   8/28/2017    Ilya Villagran    MRN: [id number]           Patient Information     Date Of Birth          1970        Visit Information        Provider Department      8/28/2017 5:00 PM Sagar Sethi MD [facility]        Today's Diagnoses     Wheezing    -  1    Cough          Care Instructions    -Use azelastine 2 sprays in each nostril twice a day when necessary.    Call to schedule methacholine challenge test:    [phone]          Follow-ups after your visit        Future tests that were ordered for you today     Open Future Orders        Priority Expected Expires Ordered    General PFT Lab (Please always keep checked) Routine  8/28/2018 8/28/2017    Pulmonary Function Test Routine  8/28/2018 8/28/2017            Who to contact     If you have questions or need follow up information about today's clinic visit or your schedule please contact [facility] directly at [phone].  Normal or non-critical lab and imaging results will be communicated to you by [employer], letter or phone within 4 business days after the clinic has received the results. If you do not hear from us within 7 days, please contact the clinic through [employer] or phone. If you have a critical or abnormal lab result, we will notify you by phone as soon as possible.  Submit refill requests through [employer] or call your pharmacy and they will forward the refill request to us. Please allow 3 business days for your refill to be completed.          Additional Information About Your Visit        [employer] Information     [employer] lets you send messages to your doctor, view your test results, renew your prescriptions, schedule appointments and more. To sign up, go to [email or web address] . Click on \"Log in\" on the left side of the screen, which will take you to the Welcome page. Then click on \"Sign up Now\" on the right side of the page.     You will be asked to enter the access code " listed below, as well as some personal information. Please follow the directions to create your username and password.     Your access code is: [email or web address]  Expires: 10/17/2017  4:11 PM     Your access code will  in 90 days. If you need help or a new code, please call your [place] clinic or [phone].        Care EveryWhere ID     This is your Care EveryWhere ID. This could be used by other organizations to access your [place] medical records  [id number]        Your Vitals Were     Pulse Temperature Pulse Oximetry BMI (Body Mass Index)          87 97.5  F (36.4  C) (Oral) 98% 34.92 kg/m2         Blood Pressure from Last 3 Encounters:   17 (!) 134/95   17 148/84   17 (!) 150/95    Weight from Last 3 Encounters:   17 259 lb 4.2 oz (117.6 kg)   17 262 lb (118.8 kg)   17 261 lb (118.4 kg)              We Performed the Following     ALLERGY SKIN TESTS,ALLERGENS     Spirometry, Breathing Capacity          Today's Medication Changes          These changes are accurate as of: 17  6:59 PM.  If you have any questions, ask your nurse or doctor.               Start taking these medicines.        Dose/Directions    azelastine 0.1 % spray   Commonly known as:  ASTELIN   Used for:  Cough   Started by:  Sagar Sethi MD        Dose:  2 spray   Spray 2 sprays into both nostrils 2 times daily as needed   Quantity:  30 mL   Refills:  3            Where to get your medicines      These medications were sent to [facility] [id number] [place], [place] [address], [place] - [address] AT [facility] [address], [place] [address]     Phone:  [phone]     azelastine 0.1 % spray                Primary Care Provider Office Phone # Fax #    Carin Newmannorm Pepe [phone] [address] [facility] [address]        Equal Access to Services     ADI PLATT AH: Sundeep Bautista, guanaco delarosa, william headley  zahra ashrafmiranda la'aan ah. So [facility] [phone].    ATENCIÓN: Si habla cecilia, tiene a smith disposición servicios gratuitos de asistencia lingüística. Viry al [phone].    We comply with applicable federal civil rights laws and [place] laws. We do not discriminate on the basis of race, color, national origin, age, disability sex, sexual orientation or gender identity.            Thank you!     Thank you for choosing [facility]  for your care. Our goal is always to provide you with excellent care. Hearing back from our patients is one way we can continue to improve our services. Please take a few minutes to complete the written survey that you may receive in the mail after your visit with us. Thank you!             Your Updated Medication List - Protect others around you: Learn how to safely use, store and throw away your medicines at www.disposemymeds.org.          This list is accurate as of: 8/28/17  6:59 PM.  Always use your most recent med list.                   Brand Name Dispense Instructions for use Diagnosis    albuterol 108 (90 BASE) MCG/ACT Inhaler    PROAIR HFA/PROVENTIL HFA/VENTOLIN HFA    1 Inhaler    Inhale 2 puffs into the lungs every 4 hours as needed for shortness of breath / dyspnea    Mild persistent asthma without complication       azelastine 0.1 % spray    ASTELIN    30 mL    Spray 2 sprays into both nostrils 2 times daily as needed    Cough       fluticasone-salmeterol 250-50 MCG/DOSE diskus inhaler    ADVAIR    1 Inhaler    Inhale 1 puff into the lungs 2 times daily    Bronchospasm       losartan 50 MG tablet    COZAAR    135 tablet    Take 1.5 tablets (75 mg) by mouth daily This is a new dose as of today.    Essential hypertension

## 2017-08-28 NOTE — NURSING NOTE
Per provider verbal order, RN placed Adult Environmental scratch testing panels.  Consent was obtained prior to procedure.  Once panels were placed, patient was monitored for 15 minutes in clinic.  RN read test after 15 minutes and provider was notified of results.  Pt tolerated procedure well.  All questions and concerns were addressed at office visit.     Sarah Becker RN

## 2017-08-28 NOTE — PROGRESS NOTES
SUBJECTIVE:                                                                   Ilya Villagran presents today to our Allergy Clinic at [facility]; he is being seen in consultation at the request of Dr. Jacobo.  As you know, he is a 46 year old male with history of asthma, obesity and hypertension.  For multiple years, he has been having some problems with wheezing. He describes wheezing as a whistling sound on expiration (frequently) and inspiration is well. Could be getting worse if he mows the lawn, and sometimes when he is talking too much on the phone. It is associated frequently with nonproductive cough. At the same time, he states that he feels some phlegm on occasions.  He feels that the cough and wheezing are treated by an itch in his upper chest.  In general, he does not feel that albuterol is helpful. He thought it was helpful in the past, but not for the last several months.  At some point, ears ago, he was on Dulera and still had symptoms.  About a month ago, he was started on Advair 230/21 mcg 2 puffs twice a day, and he did not find it effective either.  He feels that NyQuil is helpful. He tried cetirizine and did not find it beneficial.  Oral steroid last month was partially helpful. In the past, he thought that it helped more.   He is not sure if he is having shortness of breath per se. Denies chest tightness.  He saw Pulmonology in 2014, and was prescribed omeprazole for occult GERD and was recommended to continue Dulera, with recommendations to get methacholine challenge test done in he continues having symptoms. He does have a history of coughing exacerbated by lisinopril. He is currently on losartan. The patient states that in between being on lisinopril and losartan, he had symptoms for years.    In 2015, he had normal spirometry. At that time methacholine challenge test was suggested. He is not sure when it was done or not. I do not see it in Epic. He had normal chest x-ray in  July 2017.  He does have a history of some watery eyes. Allergy drops were not helpful for that, but plain lubricating eyedrops are very helpful. He may have some intermittent mild nasal congestion. Cetirizine did not seem to be helpful for it. He prefers to just below his nose. He has not tried any nasal corticosteroids or any other nasal sprays.  There is no history of ear tubes or sinus interventions.     Patient Active Problem List   Diagnosis     Bronchospasm     Essential hypertension     Hyperglycemia     Morbid obesity due to excess calories (H)     Mild persistent asthma without complication       History reviewed. No pertinent past medical history.   Problem (# of Occurrences) Relation (Name,Age of Onset)    Asthma (1) Mother    DIABETES (1) Mother    Depression (1) Brother    Hypertension (1) Cousin    Myocardial Infarction (1) Brother        Past Surgical History:   Procedure Laterality Date     LAPAROSCOPIC APPENDECTOMY  12/18/2011    Procedure:LAPAROSCOPIC APPENDECTOMY; Surgeon:JEAN CLAUDE LUNA; Location:[place] OR     Social History     Social History     Marital status:      Spouse name: N/A     Number of children: N/A     Years of education: N/A     Occupational History     /customer service [employer]     Social History Main Topics     Smoking status: Never Smoker     Smokeless tobacco: Never Used     Alcohol use Yes      Comment: 12 pack per week     Drug use: No     Sexual activity: Not Asked     Other Topics Concern     Parent/Sibling W/ Cabg, Mi Or Angioplasty Before 65f 55m? No     Social History Narrative    August 28, 2017    ENVIRONMENTAL HISTORY: [place] The home is heated with a forced air. They do have central air conditioning. The patient's bedroom is furnished with carpeting in bedroom and fabric window coverings.  Pets inside the house include 2 cats, 1 dog and 1 piglet. There is not history of cockroach or  mice infestation. There is 1 smoker in the house, only smokes outside.  The house does not have a damp basement.                Review of Systems   Constitutional: Negative for activity change, fatigue and fever.   HENT: Negative for congestion, ear pain, facial swelling, nosebleeds, postnasal drip, rhinorrhea, sinus pressure and sneezing.    Eyes: Negative for discharge, redness and itching.        Watering of eyes   Respiratory: Positive for cough, chest tightness, shortness of breath and wheezing. Negative for stridor.    Gastrointestinal: Negative for diarrhea, nausea and vomiting.   Musculoskeletal: Negative for myalgias.   Skin: Negative for rash.        eczema   Neurological: Negative for headaches.   Hematological: Negative for adenopathy.   Psychiatric/Behavioral: Negative for behavioral problems and self-injury.          Current Outpatient Prescriptions:      azelastine (ASTELIN) 0.1 % spray, Spray 2 sprays into both nostrils 2 times daily as needed, Disp: 30 mL, Rfl: 3     losartan (COZAAR) 50 MG tablet, Take 1.5 tablets (75 mg) by mouth daily This is a new dose as of today., Disp: 135 tablet, Rfl: 3     fluticasone-salmeterol (ADVAIR) 250-50 MCG/DOSE diskus inhaler, Inhale 1 puff into the lungs 2 times daily, Disp: 1 Inhaler, Rfl: 11     albuterol (PROAIR HFA/PROVENTIL HFA/VENTOLIN HFA) 108 (90 BASE) MCG/ACT Inhaler, Inhale 2 puffs into the lungs every 4 hours as needed for shortness of breath / dyspnea, Disp: 1 Inhaler, Rfl: 3    There is no immunization history on file for this patient.  Allergies   Allergen Reactions     Ace Inhibitors Cough     OBJECTIVE:                                                                 BP (!) 134/95 (BP Location: Left arm, Patient Position: Sitting, Cuff Size: Adult Large)  Pulse 87  Temp 97.5  F (36.4  C) (Oral)  Wt 259 lb 4.2 oz (117.6 kg)  SpO2 98%  BMI 34.92 kg/m2        Physical Exam   Constitutional: He is oriented to person, place, and time. No distress.    HENT:   Head: Normocephalic and atraumatic.   Right Ear: Tympanic membrane, external ear and ear canal normal.   Left Ear: Tympanic membrane, external ear and ear canal normal.   Nose: No mucosal edema or rhinorrhea.   Eyes: Conjunctivae are normal. Right eye exhibits no discharge. Left eye exhibits no discharge.   Neck: Normal range of motion.   Cardiovascular: Normal rate, regular rhythm and normal heart sounds.    No murmur heard.  Pulmonary/Chest: Effort normal and breath sounds normal. No respiratory distress. He has no wheezes. He has no rales.   Musculoskeletal: Normal range of motion.   Neurological: He is alert and oriented to person, place, and time.   Skin: Skin is warm. He is not diaphoretic.   Psychiatric: Affect normal.   Nursing note and vitals reviewed.            WORKUP:  SPIROMETRY       FVC 5.34L (98% of predicted).     FEV1 4.25L (99% of predicted).     FEV1/FVC 80%     FEF 25%-75%  4.03L/s (103% of predicted)  The office spirometry performed today doesn't suggest an obstruction.      Asthma Control Test (ACT) total score: 21       Percutaneous skin puncture testing for aeroallergens performed today (August 28, 2017) was negative with appropriate responses to positive and negative controls. See scanned testing sheet for more details.      ASSESSMENT/PLAN:      Visit Diagnoses     1. Wheezing    -  Primary  Chronic cough and wheezing, differential involves postnasal drainage, silent gastroesophageal reflux, paradoxical vocal cord motion, asthma and hypersensitive larynx.  Considering lack of sensitivity for seasonal/environmental allergens, and normal office spirometry, I recommend methacholine challenge test.  Depending on the results, may consider referral to voice clinic for evaluation of vocal cord dysfunction, and then ENT eval, and/or Pulmonology reevaluation.            Other Relevant Orders    Spirometry, Breathing Capacity (Completed)    ALLERGY SKIN TESTS,ALLERGENS (Completed)     General PFT Lab (Please always keep checked)    Pulmonary Function Test    2. Cough      Since he states that NyQuil improves his cough, I wonder how much postnasal drainage has to do in pathophysiology of his symptoms, though cetirizine did not seem to be helpful.   -Recommend a trial of azelastine 2 sprays in each nostril twice a day as needed.      Relevant Medications    azelastine (ASTELIN) 0.1 % spray    Other Relevant Orders    Pulmonary Function Test            Follow-up depending on the results of methacholine challenge test.    Thank you for allowing us to participate in the care of this patient. Please feel free to contact us if there are any questions or concerns about the patient.    Disclaimer: This note consists of symbols derived from keyboarding, dictation and/or voice recognition software. As a result, there may be errors in the script that have gone undetected. Please consider this when interpreting information found in this chart.    Sagar Sethi MD, FACI  Allergy, Asthma and Immunology  [place], [place] and [place]

## 2017-08-29 ASSESSMENT — ASTHMA QUESTIONNAIRES: ACT_TOTALSCORE: 21

## 2017-09-25 ENCOUNTER — HOSPITAL ENCOUNTER (OUTPATIENT)
Dept: RESPIRATORY THERAPY | Facility: CLINIC | Age: 47
Discharge: HOME | End: 2017-09-25
Attending: INTERNAL MEDICINE | Admitting: INTERNAL MEDICINE
Payer: COMMERCIAL

## 2017-09-25 DIAGNOSIS — R05.9 COUGH: ICD-10-CM

## 2017-09-25 DIAGNOSIS — R06.2 WHEEZING: ICD-10-CM

## 2017-09-25 LAB
EXPTIME-%CHANGE-CHLG: 14 %
EXPTIME-CHLG: 6.9 SEC
EXPTIME-PRE: 6.02 SEC
FEF2575-%CHANGE-CHLG: -47 %
FEF2575-%PRED-CHLG: 70 %
FEF2575-%PRED-POST: 115 %
FEF2575-%PRED-PRE: 134 %
FEF2575-POST: 4.5 L/SEC
FEF2575-PRE: 5.24 L/SEC
FEF2575-PRED: 3.88 L/SEC
FEFMAX-%PRED-PRE: 90 %
FEFMAX-PRE: 9.37 L/SEC
FEFMAX-PRED: 10.39 L/SEC
FEV1-%PRED-PRE: 103 %
FEV1-PRE: 4.41 L
FEV1FEV6-PRE: 85 %
FEV1FEV6-PRED: 81 %
FEV1FVC-PRE: 85 %
FEV1FVC-PRED: 79 %
FIFMAX-%CHANGE-CHLG: -7 %
FIFMAX-CHLG: 6.32 L/SEC
FIFMAX-PRE: 6.85 L/SEC
FVC-%PRED-PRE: 96 %
FVC-PRE: 5.2 L
FVC-PRED: 5.41 L
HGB BLD-MCNC: 14.6 G/DL (ref 13.3–17.7)

## 2017-09-25 PROCEDURE — 94070 EVALUATION OF WHEEZING: CPT | Mod: 26 | Performed by: INTERNAL MEDICINE

## 2017-09-25 PROCEDURE — 95070 INHLJ BRNCL CHALLENGE TSTG: CPT

## 2017-09-25 PROCEDURE — 94726 PLETHYSMOGRAPHY LUNG VOLUMES: CPT | Mod: 26 | Performed by: INTERNAL MEDICINE

## 2017-09-25 PROCEDURE — 94070 EVALUATION OF WHEEZING: CPT

## 2017-09-25 PROCEDURE — 36415 COLL VENOUS BLD VENIPUNCTURE: CPT | Performed by: ALLERGY & IMMUNOLOGY

## 2017-09-25 PROCEDURE — 85018 HEMOGLOBIN: CPT | Performed by: ALLERGY & IMMUNOLOGY

## 2017-09-25 PROCEDURE — 94726 PLETHYSMOGRAPHY LUNG VOLUMES: CPT

## 2017-09-25 PROCEDURE — 25000125 ZZHC RX 250: Performed by: ALLERGY & IMMUNOLOGY

## 2017-09-25 PROCEDURE — 25000308 HC RX OP HPI UCR WEL MED 250 IP 250: Performed by: ALLERGY & IMMUNOLOGY

## 2017-09-25 PROCEDURE — 94729 DIFFUSING CAPACITY: CPT | Mod: 26 | Performed by: INTERNAL MEDICINE

## 2017-09-25 PROCEDURE — 94729 DIFFUSING CAPACITY: CPT

## 2017-09-25 RX ORDER — ALBUTEROL SULFATE 0.83 MG/ML
2.5 SOLUTION RESPIRATORY (INHALATION) ONCE
Status: COMPLETED | OUTPATIENT
Start: 2017-09-25 | End: 2017-09-25

## 2017-09-25 RX ADMIN — ALBUTEROL SULFATE 2.5 MG: 2.5 SOLUTION RESPIRATORY (INHALATION) at 18:57

## 2017-09-25 RX ADMIN — METHACHOLINE CHLORIDE 100 MG: 100 POWDER, FOR SOLUTION RESPIRATORY (INHALATION) at 17:30

## 2017-10-06 LAB
DLCOCOR-%PRED-PRE: 115 %
DLCOCOR-PRE: 37.06 ML/MIN/MMHG
DLCOUNC-%PRED-PRE: 115 %
DLCOUNC-PRE: 37.06 ML/MIN/MMHG
DLCOUNC-PRED: 32.04 ML/MIN/MMHG
ERV-%PRED-PRE: 83 %
ERV-PRE: 0.94 L
ERV-PRED: 1.13 L
EXPTIME-PRE: 7.27 SEC
FEF2575-%PRED-PRE: 117 %
FEF2575-PRE: 4.56 L/SEC
FEF2575-PRED: 3.88 L/SEC
FEFMAX-%PRED-PRE: 80 %
FEFMAX-PRE: 8.4 L/SEC
FEFMAX-PRED: 10.39 L/SEC
FEV1-%PRED-PRE: 101 %
FEV1-PRE: 4.34 L
FEV1FEV6-PRE: 82 %
FEV1FEV6-PRED: 81 %
FEV1FVC-PRE: 82 %
FEV1FVC-PRED: 79 %
FEV1SVC-PRE: 81 %
FEV1SVC-PRED: 76 %
FIFMAX-PRE: 6.97 L/SEC
FRCPLETH-%PRED-PRE: 104 %
FRCPLETH-PRE: 3.78 L
FRCPLETH-PRED: 3.62 L
FVC-%PRED-PRE: 98 %
FVC-PRE: 5.31 L
FVC-PRED: 5.41 L
IC-%PRED-PRE: 97 %
IC-PRE: 4.37 L
IC-PRED: 4.5 L
RVPLETH-%PRED-PRE: 124 %
RVPLETH-PRE: 2.75 L
RVPLETH-PRED: 2.22 L
TLCPLETH-%PRED-PRE: 108 %
TLCPLETH-PRE: 8.14 L
TLCPLETH-PRED: 7.54 L
VA-%PRED-PRE: 97 %
VA-PRE: 7.04 L
VC-%PRED-PRE: 95 %
VC-PRE: 5.39 L
VC-PRED: 5.63 L

## 2017-10-08 NOTE — PROGRESS NOTES
Normal pulmonary function.  The results of methacholine challenge test are pending; however, from what I'm able to see, there was no decrease in FEV1 by 20% at any point of the challenge.  It would argue against asthma.  -I wonder if azelastine helped with patient's cough, since in the past NyQuil was thought to be helpful.  If he continues having symptoms, consider Pulmonology reevaluation, and evaluation at voice clinic for vocal cord dysfunction.  We can discuss that during follow-up visit, which can be done at a time this month.

## 2017-11-17 ENCOUNTER — TELEPHONE (OUTPATIENT)
Dept: ALLERGY | Facility: CLINIC | Age: 47
End: 2017-11-17

## 2017-11-17 RX ORDER — ALBUTEROL SULFATE 90 UG/1
2 AEROSOL, METERED RESPIRATORY (INHALATION) EVERY 4 HOURS PRN
Qty: 1 INHALER | Refills: 3 | Status: CANCELLED | OUTPATIENT
Start: 2017-11-17

## 2017-11-17 NOTE — TELEPHONE ENCOUNTER
albuterol (PROAIR HFA/PROVENTIL HFA/VENTOLIN HFA) 108 (90 BASE) MCG/ACT Inhaler    Date Last Filled: 10/07/16  QTY: 18    [facility] [place]

## 2017-11-17 NOTE — LETTER
[facility]  Allergy & Asthma Clinic  [address] [address]  [phone]      Ilya Villagran  [address] [address]        November 22, 2017      Dear Ilya Villagran,      We received a request to refill albuterol.  Unfortunately, this medication request has been denied since your methacholine challenge test was negative.  If you feel that you need this medication, please contact our office at [phone], to schedule an appointment with Dr. Sethi at your earliest convenience.        Sincerely,      Your [facility] team

## 2017-11-17 NOTE — TELEPHONE ENCOUNTER
albuterol  Last Written Prescription Date: 7/19/17  Last Fill Quantity: 1, # refills: 3    Last Office Visit with G, P or [facility] prescribing provider:  8/28/17  Future Office Visit:       Date of Last Asthma Action Plan Letter:   Asthma Action Plan Q1 Year    Topic Date Due     Asthma Action Plan - yearly  09/25/1974      Asthma Control Test:   ACT Total Scores 8/28/2017   ACT TOTAL SCORE (Goal Greater than or Equal to 20) 21   In the past 12 months, how many times did you visit the emergency room for your asthma without being admitted to the hospital? 0   In the past 12 months, how many times were you hospitalized overnight because of your asthma? 0       Date of Last Spirometry Test:   No results found for this or any previous visit.      Routing refill request to provider for review/approval because:  Possible overuse of med? Patient had normal PFT.  Please advise.  Sarah Becker RN

## 2017-11-21 NOTE — TELEPHONE ENCOUNTER
Left a message for patient to call back to inform him that his refill for albuterol was declined, patient had a negative methacholine challenge test. Patient needs to schedule a follow-up appointment.     Notes Recorded by Sarah Becker, RN on 10/9/2017 at 5:01 PM  Spoke with patient and reviewed normal PFT.  Patient states azelastine and really helped with his cough.  Per Dr. FLORENCE, ok to f/u in 3 months if patient is doing well.  If not, patient can f/u in 1 month.  Patient states understanding.  LAYTON Ardon RN

## 2017-11-22 NOTE — TELEPHONE ENCOUNTER
No call back from patient.  Mailed letter notifying patient of denial (because methacholine test was negative) and need for f/u appointment if he is still using this medication.  Sarah Becker RN

## 2017-11-24 NOTE — TELEPHONE ENCOUNTER
Left detailed message for [facility] letting them know the Rx request is denied and to have patient call us to speak to us if he has questions.  Sarah Becker RN

## 2017-11-30 DIAGNOSIS — J45.30 MILD PERSISTENT ASTHMA WITHOUT COMPLICATION: ICD-10-CM

## 2017-11-30 RX ORDER — ALBUTEROL SULFATE 90 UG/1
2 AEROSOL, METERED RESPIRATORY (INHALATION) EVERY 4 HOURS PRN
Qty: 999 INHALER | Refills: 0 | OUTPATIENT
Start: 2017-11-30

## 2017-11-30 NOTE — TELEPHONE ENCOUNTER
See phone note from 11/17/17.  Rx denied.  Patient was mailed letter and tried calling patient too.  No call back.  Sarah Becker RN

## 2018-01-10 ENCOUNTER — OFFICE VISIT (OUTPATIENT)
Dept: FAMILY MEDICINE | Facility: CLINIC | Age: 48
End: 2018-01-10
Payer: COMMERCIAL

## 2018-01-10 VITALS
SYSTOLIC BLOOD PRESSURE: 147 MMHG | HEART RATE: 99 BPM | DIASTOLIC BLOOD PRESSURE: 109 MMHG | BODY MASS INDEX: 35.19 KG/M2 | TEMPERATURE: 98 F | OXYGEN SATURATION: 96 % | HEIGHT: 72 IN | WEIGHT: 259.8 LBS

## 2018-01-10 DIAGNOSIS — R42 DIZZINESS: Primary | ICD-10-CM

## 2018-01-10 LAB
ANION GAP SERPL CALCULATED.3IONS-SCNC: 5 MMOL/L (ref 3–14)
BUN SERPL-MCNC: 25 MG/DL (ref 7–30)
CALCIUM SERPL-MCNC: 8.7 MG/DL (ref 8.5–10.1)
CHLORIDE SERPL-SCNC: 102 MMOL/L (ref 94–109)
CO2 SERPL-SCNC: 28 MMOL/L (ref 20–32)
CREAT SERPL-MCNC: 0.89 MG/DL (ref 0.66–1.25)
ERYTHROCYTE [DISTWIDTH] IN BLOOD BY AUTOMATED COUNT: 13.7 % (ref 10–15)
GFR SERPL CREATININE-BSD FRML MDRD: >90 ML/MIN/1.7M2
GLUCOSE SERPL-MCNC: 138 MG/DL (ref 70–99)
HBA1C MFR BLD: 6.7 % (ref 4.3–6)
HCT VFR BLD AUTO: 43.1 % (ref 40–53)
HGB BLD-MCNC: 15.5 G/DL (ref 13.3–17.7)
MAGNESIUM SERPL-MCNC: 2.4 MG/DL (ref 1.6–2.3)
MCH RBC QN AUTO: 30.1 PG (ref 26.5–33)
MCHC RBC AUTO-ENTMCNC: 36 G/DL (ref 31.5–36.5)
MCV RBC AUTO: 84 FL (ref 78–100)
PLATELET # BLD AUTO: 174 10E9/L (ref 150–450)
POTASSIUM SERPL-SCNC: 3.8 MMOL/L (ref 3.4–5.3)
RBC # BLD AUTO: 5.15 10E12/L (ref 4.4–5.9)
SODIUM SERPL-SCNC: 135 MMOL/L (ref 133–144)
TSH SERPL DL<=0.005 MIU/L-ACNC: 2.06 MU/L (ref 0.4–4)
WBC # BLD AUTO: 6.1 10E9/L (ref 4–11)

## 2018-01-10 PROCEDURE — 80048 BASIC METABOLIC PNL TOTAL CA: CPT | Performed by: INTERNAL MEDICINE

## 2018-01-10 PROCEDURE — 84443 ASSAY THYROID STIM HORMONE: CPT | Performed by: INTERNAL MEDICINE

## 2018-01-10 PROCEDURE — 83735 ASSAY OF MAGNESIUM: CPT | Performed by: INTERNAL MEDICINE

## 2018-01-10 PROCEDURE — 83036 HEMOGLOBIN GLYCOSYLATED A1C: CPT | Performed by: INTERNAL MEDICINE

## 2018-01-10 PROCEDURE — 85027 COMPLETE CBC AUTOMATED: CPT | Performed by: INTERNAL MEDICINE

## 2018-01-10 PROCEDURE — 36415 COLL VENOUS BLD VENIPUNCTURE: CPT | Performed by: INTERNAL MEDICINE

## 2018-01-10 PROCEDURE — 99214 OFFICE O/P EST MOD 30 MIN: CPT | Performed by: INTERNAL MEDICINE

## 2018-01-10 RX ORDER — METHYLPREDNISOLONE 4 MG
TABLET, DOSE PACK ORAL
Qty: 21 TABLET | Refills: 0 | Status: SHIPPED | OUTPATIENT
Start: 2018-01-10 | End: 2018-04-27

## 2018-01-10 NOTE — PROGRESS NOTES
"  SUBJECTIVE:   Ilya Villagran is a 47 year old male who presents to clinic today for the following health issues:  Chief Complaint   Patient presents with     Hypertension     Dizziness     x several weeks, lightheaded        Hypertension Follow-up      Outpatient blood pressures are being checked at store.  Results are 170s/115 @[facility] .    Low Salt Diet: adding some salt, not eating a lot of packaged foods or fast foods.        Amount of exercise or physical activity: not enough, works 12 hour days, running around a lot at work.  Stays active at work.      Problems taking medications regularly: No    Medication side effects: lightheadedness, cramping a lot lately     Diet: trying to eat low carb and high protein     Dizziness  Onset: 3 - 4 weeks  Worse w/ severe coughing, and bending over and standing up.  Feels like he gets vertigo at times.      Description:  Has a constant feeling of \"something not right\".  COUGHING makes worse. Patient reports he is light sensitive, florescent lights bother patient.  Do you feel faint:  YES- patient reports sometimes he feels like he is going to faint.   Had an episode on a [employer] ride over the summer where he could not handle the spinning of the ride and has never had a feeling like that.    Will sometimes see stars w/ cough.  Had a coughing episode while driving recently, coughing so hard road and horizon seemed to look \"umbalanced\"  He has been having some trouble focusing and has been having muscle cramps in the legs and sides.   Does it feel like the surroundings (bed, room) are moving: YES- sometimes  Unsteady/off balance: YES  Have you passed out or fallen: no     Intensity: mild right now, moderate w/ some coughing fits    Progression of Symptoms:  worsening    Accompanying Signs & Symptoms:  Heart palpitations: no   Nausea, vomiting: no   Weakness in arms or legs: no   Fatigue: no   Vision or speech changes: YES- some vision issues w/ dizziness, blurriness   Ringing in " ears (Tinnitus): no   Hearing Loss: he notes that music has sounded differently recently    History:    Head trauma/concussion hx: no   Previous similar symptoms: no   Recent bleeding history: no     Precipitating factors:   Worse with activity or head movement: YES  Any new medications (BP?): no, started back on BP medication in Aug.   Alcohol/drug abuse/withdrawal: no     Alleviating factors:   Does staying in a fixed position give relief:  no     Therapies Tried and outcome: none    Ilya presents with 3-4 weeks of constant dizziness as detailed above.  He does report having a respiratory illness at the onset.      Problem list and histories reviewed & adjusted, as indicated.  Additional history: as documented    Patient Active Problem List   Diagnosis     Bronchospasm     Essential hypertension     Hyperglycemia     Morbid obesity due to excess calories (H)     Mild persistent asthma without complication     Past Surgical History:   Procedure Laterality Date     LAPAROSCOPIC APPENDECTOMY  12/18/2011    Procedure:LAPAROSCOPIC APPENDECTOMY; Surgeon:JEAN CLAUDE LUNA; Location:[facility] OR       Social History   Substance Use Topics     Smoking status: Never Smoker     Smokeless tobacco: Never Used     Alcohol use Yes      Comment: 12 pack per week     Family History   Problem Relation Age of Onset     DIABETES Mother      Asthma Mother      Depression Brother      Myocardial Infarction Brother      Hypertension Cousin          Current Outpatient Prescriptions   Medication Sig Dispense Refill     methylPREDNISolone (MEDROL DOSEPAK) 4 MG tablet Follow package instructions 21 tablet 0     azelastine (ASTELIN) 0.1 % spray Spray 2 sprays into both nostrils 2 times daily as needed 30 mL 3     losartan (COZAAR) 50 MG tablet Take 1.5 tablets (75 mg) by mouth daily This is a new dose as of today. 135 tablet 3     fluticasone-salmeterol (ADVAIR) 250-50 MCG/DOSE diskus inhaler Inhale 1 puff into the lungs 2 times daily  (Patient not taking: Reported on 1/10/2018) 1 Inhaler 11     albuterol (PROAIR HFA/PROVENTIL HFA/VENTOLIN HFA) 108 (90 BASE) MCG/ACT Inhaler Inhale 2 puffs into the lungs every 4 hours as needed for shortness of breath / dyspnea 1 Inhaler 3     Allergies   Allergen Reactions     Ace Inhibitors Cough         Reviewed and updated as needed this visit by clinical staff       Reviewed and updated as needed this visit by Provider         ROS:  Constitutional, HEENT, neuro systems are negative, except as otherwise noted.      OBJECTIVE:   BP (!) 147/109 (BP Location: Left arm, Patient Position: Standing, Cuff Size: Adult Large)  Pulse 99  Temp 98  F (36.7  C) (Tympanic)  Ht 6' (1.829 m)  Wt 259 lb 12.8 oz (117.8 kg)  SpO2 96%  BMI 35.24 kg/m2  Body mass index is 35.24 kg/(m^2).    GENERAL: healthy, alert and no distress  RESP: lungs clear to auscultation - no rales, rhonchi or wheezes  CV: regular rate and rhythm, normal S1 S2, no S3 or S4, no murmur, click or rub, no peripheral edema  NEURO: Cranial nerves intact, normal strength and tone, sensory exam grossly normal, mentation intact, DTR's normal and symmetric at patellas, gait normal including heel/toe/tandem walking and Romberg normal, normal finger to nose and heel to shin tests, negative Marleni-Hallpike.  He endorses worsened dizziness when he closes his eyes      Diagnostic Test Results:  Results for orders placed or performed in visit on 01/10/18 (from the past 24 hour(s))   CBC with platelets   Result Value Ref Range    WBC 6.1 4.0 - 11.0 10e9/L    RBC Count 5.15 4.4 - 5.9 10e12/L    Hemoglobin 15.5 13.3 - 17.7 g/dL    Hematocrit 43.1 40.0 - 53.0 %    MCV 84 78 - 100 fl    MCH 30.1 26.5 - 33.0 pg    MCHC 36.0 31.5 - 36.5 g/dL    RDW 13.7 10.0 - 15.0 %    Platelet Count 174 150 - 450 10e9/L   Hemoglobin A1c   Result Value Ref Range    Hemoglobin A1C 6.7 (H) 4.3 - 6.0 %       ASSESSMENT/PLAN:       1. Dizziness    Possibly could be vestibular neuritis since it  started with a likely viral respiratory infection.  He does not have symptoms consistent with BPPV and had normal Marleni-Hallpike.  He has a normal neurological exam, so less concerning for mass or stroke.  He is not orthostatic.  Has not been having palpitations.  He was diagnosed with pre-diabetes and was concerned this has progressed, which indeed it has but A1C is still only 6.7, so I doubt this is contributing to his dizziness.  CBC is normal.  He is having muscle cramps, so checking some lytes and TSH.      We will try a Medrol dose pack to see if that helps with possible vestibular neuritis.  If not improving next week, consider brain MRI.      - Basic metabolic panel  - CBC with platelets  - Hemoglobin A1c  - TSH with free T4 reflex  - Magnesium  - methylPREDNISolone (MEDROL DOSEPAK) 4 MG tablet; Follow package instructions  Dispense: 21 tablet; Refill: 0    Vinh Ortega MD  [facility]

## 2018-01-10 NOTE — NURSING NOTE
Chief Complaint   Patient presents with     Hypertension     Dizziness     x several weeks, lightheaded        Initial BP (!) 150/104 (BP Location: Right arm, Patient Position: Chair, Cuff Size: Adult Large)  Pulse 97  Temp 98  F (36.7  C) (Tympanic)  Ht 6' (1.829 m)  Wt 259 lb 12.8 oz (117.8 kg)  SpO2 96%  BMI 35.24 kg/m2 Estimated body mass index is 35.24 kg/(m^2) as calculated from the following:    Height as of this encounter: 6' (1.829 m).    Weight as of this encounter: 259 lb 12.8 oz (117.8 kg).  Medication Reconciliation: complete   Jazmyn CLARK CMA (AAMA)

## 2018-01-10 NOTE — PATIENT INSTRUCTIONS
We'll check some lab work and try the steroids.  If symptoms are not improving by early next week with the steroids, let me know, and we can do a scan of the head.      Follow-up for a free nurse blood pressure check when your dizziness is improved and we can readdress the blood pressure medications.      You can follow up with Dr. Sagar Sethi about the cough.          Inner Ear Problems: Causes of Dizziness (Vertigo)       Benign positional vertigo (BPV)  This is the most common cause of vertigo. BPV is also called benign positional paroxysmal vertigo (BPPV). It happens when crystals in the ear canals shift into the wrong place. Vertigo usually occurs when you move your head in a certain way. This can happen when turning in bed, bending, or looking up. Because BPV comes on quickly, you should think about if you are safe to drive or do other tasks that need your full attention.  BPV:    Causes vertigo that last for seconds. Vertigo can occur several times a day, depending on body position.    Doesn t cause hearing loss    Often goes away on its own. But it but may go away sooner with treatment.  Infection or inflammation  Sometimes the semicircular canals swell and send incorrect balance signals. This problem may be caused by a viral infection. Depending on the cause, your hearing can be affected (labyrinthitis). Or your hearing can remain normal (neuronitis).  Infection or inflammation:    Causes vertigo that lasts for hours or days. The first episode is usually the worst.    Can cause hearing loss    Often goes away on its own. But it may go away sooner with treatment.  You may need vestibular rehabilitation if you have balance problems that don't go away.  Meniere s disease  This condition is uncommon. It happens when there is too much fluid in the ear canals. This causes increased pressure and swelling. It affects balance and hearing signals.  Meniere s disease may:    Cause vertigo that last for  hours    Cause hearing problems that come and go. The problems are usually in one ear and get worse over time.    Cause buzzing or ringing in the ears (tinnitus)    Cause a feeling of fullness or pressure in the ear    Cause any of these symptoms: vertigo, hearing loss, tinnitus, or ear fullness to last a lifetime  Date Last Reviewed: 11/1/2016 2000-2017 [employer]. [address] [place] [address]. All rights reserved. This information is not intended as a substitute for professional medical care. Always follow your healthcare professional's instructions.

## 2018-01-10 NOTE — MR AVS SNAPSHOT
After Visit Summary   1/10/2018    Ilya Villagran    MRN: [id number]           Patient Information     Date Of Birth          1970        Visit Information        Provider Department      1/10/2018 4:00 PM Vinh Ortega MD [facility]        Today's Diagnoses     Dizziness    -  1      Care Instructions    We'll check some lab work and try the steroids.  If symptoms are not improving by early next week with the steroids, let me know, and we can do a scan of the head.      Follow-up for a free nurse blood pressure check when your dizziness is improved and we can readdress the blood pressure medications.      You can follow up with Dr. Sagra Sethi about the cough.          Inner Ear Problems: Causes of Dizziness (Vertigo)       Benign positional vertigo (BPV)  This is the most common cause of vertigo. BPV is also called benign positional paroxysmal vertigo (BPPV). It happens when crystals in the ear canals shift into the wrong place. Vertigo usually occurs when you move your head in a certain way. This can happen when turning in bed, bending, or looking up. Because BPV comes on quickly, you should think about if you are safe to drive or do other tasks that need your full attention.  BPV:    Causes vertigo that last for seconds. Vertigo can occur several times a day, depending on body position.    Doesn t cause hearing loss    Often goes away on its own. But it but may go away sooner with treatment.  Infection or inflammation  Sometimes the semicircular canals swell and send incorrect balance signals. This problem may be caused by a viral infection. Depending on the cause, your hearing can be affected (labyrinthitis). Or your hearing can remain normal (neuronitis).  Infection or inflammation:    Causes vertigo that lasts for hours or days. The first episode is usually the worst.    Can cause hearing loss    Often goes away on its own. But it may go away sooner with treatment.  You may  "need vestibular rehabilitation if you have balance problems that don't go away.  Meniere s disease  This condition is uncommon. It happens when there is too much fluid in the ear canals. This causes increased pressure and swelling. It affects balance and hearing signals.  Meniere s disease may:    Cause vertigo that last for hours    Cause hearing problems that come and go. The problems are usually in one ear and get worse over time.    Cause buzzing or ringing in the ears (tinnitus)    Cause a feeling of fullness or pressure in the ear    Cause any of these symptoms: vertigo, hearing loss, tinnitus, or ear fullness to last a lifetime  Date Last Reviewed: 11/1/2016 2000-2017 [employer]. [address] [place] [address], [place] [address]. All rights reserved. This information is not intended as a substitute for professional medical care. Always follow your healthcare professional's instructions.                Follow-ups after your visit        Who to contact     If you have questions or need follow up information about today's clinic visit or your schedule please contact [facility] directly at [phone].  Normal or non-critical lab and imaging results will be communicated to you by Tr[employer]Mailhart, letter or phone within 4 business days after the clinic has received the results. If you do not hear from us within 7 days, please contact the clinic through [employer] or phone. If you have a critical or abnormal lab result, we will notify you by phone as soon as possible.  Submit refill requests through [employer] or call your pharmacy and they will forward the refill request to us. Please allow 3 business days for your refill to be completed.          Additional Information About Your Visit        T[employer]ored [employer] Information     [employer] lets you send messages to your doctor, view your test results, renew your prescriptions, schedule appointments and more. To sign up, go to [email or web address] . Click on \"Log " "in\" on the left side of the screen, which will take you to the Welcome page. Then click on \"Sign up Now\" on the right side of the page.     You will be asked to enter the access code listed below, as well as some personal information. Please follow the directions to create your username and password.     Your access code is: [email or web address]  Expires: 4/10/2018  5:05 PM     Your access code will  in 90 days. If you need help or a new code, please call your [place] clinic or [phone].        Care EveryWhere ID     This is your Care EveryWhere ID. This could be used by other organizations to access your [place] medical records  [id number]        Your Vitals Were     Pulse Temperature Height Pulse Oximetry BMI (Body Mass Index)       99 98  F (36.7  C) (Tympanic) 6' (1.829 m) 96% 35.24 kg/m2        Blood Pressure from Last 3 Encounters:   01/10/18 (!) 147/109   17 (!) 134/95   17 148/84    Weight from Last 3 Encounters:   01/10/18 259 lb 12.8 oz (117.8 kg)   17 259 lb 4.2 oz (117.6 kg)   17 262 lb (118.8 kg)              We Performed the Following     Basic metabolic panel     CBC with platelets     Hemoglobin A1c     Magnesium     TSH with free T4 reflex          Today's Medication Changes          These changes are accurate as of: 1/10/18  5:05 PM.  If you have any questions, ask your nurse or doctor.               Start taking these medicines.        Dose/Directions    methylPREDNISolone 4 MG tablet   Commonly known as:  MEDROL DOSEPAK   Used for:  Dizziness   Started by:  Vinh Ortega MD        Follow package instructions   Quantity:  21 tablet   Refills:  0            Where to get your medicines      These medications were sent to [facility] [place], [place] - [address]  [address] [address]     Phone:  [phone]     methylPREDNISolone 4 MG tablet                Primary Care Provider Office Phone # Fax #    Carin Pepe DO " [phone]       [address] [address]        Equal Access to Services     ADI PLATT : Hadii aad ku hadkodygalen Michele, waayannada luyamel, alexta kaphuda conor, william marte laAlanbrenda tiradoGabriel Gloria [facility] [phone].    ATENCIÓN: Si habla español, tiene a smith disposición servicios gratuitos de asistencia lingüística. Llame al [phone].    We comply with applicable federal civil rights laws and [place] laws. We do not discriminate on the basis of race, color, national origin, age, disability, sex, sexual orientation, or gender identity.            Thank you!     Thank you for choosing [facility]  for your care. Our goal is always to provide you with excellent care. Hearing back from our patients is one way we can continue to improve our services. Please take a few minutes to complete the written survey that you may receive in the mail after your visit with us. Thank you!             Your Updated Medication List - Protect others around you: Learn how to safely use, store and throw away your medicines at www.disposemymeds.org.          This list is accurate as of: 1/10/18  5:05 PM.  Always use your most recent med list.                   Brand Name Dispense Instructions for use Diagnosis    albuterol 108 (90 BASE) MCG/ACT Inhaler    PROAIR HFA/PROVENTIL HFA/VENTOLIN HFA    1 Inhaler    Inhale 2 puffs into the lungs every 4 hours as needed for shortness of breath / dyspnea    Mild persistent asthma without complication       azelastine 0.1 % spray    ASTELIN    30 mL    Spray 2 sprays into both nostrils 2 times daily as needed    Cough       fluticasone-salmeterol 250-50 MCG/DOSE diskus inhaler    ADVAIR    1 Inhaler    Inhale 1 puff into the lungs 2 times daily    Bronchospasm       losartan 50 MG tablet    COZAAR    135 tablet    Take 1.5 tablets (75 mg) by mouth daily This is a new dose as of today.    Essential hypertension       methylPREDNISolone 4 MG  tablet    MEDROL DOSEPAK    21 tablet    Follow package instructions    Dizziness

## 2018-01-12 PROBLEM — E11.9 TYPE 2 DIABETES MELLITUS WITHOUT COMPLICATION, WITHOUT LONG-TERM CURRENT USE OF INSULIN (H): Status: ACTIVE | Noted: 2018-01-12

## 2018-01-15 ENCOUNTER — OFFICE VISIT (OUTPATIENT)
Dept: FAMILY MEDICINE | Facility: CLINIC | Age: 48
End: 2018-01-15
Payer: COMMERCIAL

## 2018-01-15 VITALS
DIASTOLIC BLOOD PRESSURE: 97 MMHG | BODY MASS INDEX: 35.11 KG/M2 | HEIGHT: 72 IN | OXYGEN SATURATION: 96 % | TEMPERATURE: 98.1 F | WEIGHT: 259.2 LBS | HEART RATE: 79 BPM | SYSTOLIC BLOOD PRESSURE: 161 MMHG

## 2018-01-15 DIAGNOSIS — Z23 NEED FOR DIPHTHERIA-TETANUS-PERTUSSIS (TDAP) VACCINE: ICD-10-CM

## 2018-01-15 DIAGNOSIS — E11.9 TYPE 2 DIABETES MELLITUS WITHOUT COMPLICATION, WITHOUT LONG-TERM CURRENT USE OF INSULIN (H): Primary | ICD-10-CM

## 2018-01-15 PROCEDURE — 90471 IMMUNIZATION ADMIN: CPT | Performed by: INTERNAL MEDICINE

## 2018-01-15 PROCEDURE — 99215 OFFICE O/P EST HI 40 MIN: CPT | Mod: 25 | Performed by: INTERNAL MEDICINE

## 2018-01-15 PROCEDURE — 90715 TDAP VACCINE 7 YRS/> IM: CPT | Performed by: INTERNAL MEDICINE

## 2018-01-15 NOTE — MR AVS SNAPSHOT
After Visit Summary   1/15/2018    Ilya Villagran    MRN: [id number]           Patient Information     Date Of Birth          1970        Visit Information        Provider Department      1/15/2018 6:00 PM Vinh Ortega MD [facility]        Today's Diagnoses     Need for diphtheria-tetanus-pertussis (Tdap) vaccine    -  1    Type 2 diabetes mellitus without complication, without long-term current use of insulin (H)          Care Instructions    Return to have a fasting blood draw (8 hours) to check the cholesterol.  Schedule a diabetic eye exam every year.    Recheck A1C in 3 months    What Is Diabetes?  If you have diabetes, your body does not make enough insulin (a hormone), or the insulin it makes does not work the way it should. Diabetes is a lifelong disease.  Glucose (sugar) is your body's main source of energy. Insulin carries glucose from the bloodstream into your body's cells. But if you have diabetes, glucose builds up in your blood. This is called high blood glucose (high blood sugar).  High blood glucose can lead to damage in many parts of your body, including your eyes, kidneys, heart, blood vessels, nerves and skin.  What are the symptoms of diabetes?  Symptoms include:    Extreme thirst    Needing to urinate more often    Headache    Hunger    Blurred vision    Feeling drowsy or tired    Slow healing after an illness or injury    Frequent infections.  What should I do if I have diabetes?  Your first step is to learn how to manage your diabetes. The goal is to keep your blood glucose as close to normal as possible. (A normal level is 70 to 100.) By doing this, you can prevent or control damage to your body.  To manage your diabetes, you will need to:    Eat a wide range of healthy foods.    Manage your weight.    Be physically active.    Test your blood glucose as prescribed.    Control your blood pressure and cholesterol.    Take your medicines as prescribed.  Are  there different kinds of diabetes?  There are two basic kinds of diabetes: type 1 and type 2.  Type 1 diabetes  Type 1 diabetes occurs when the cells that make insulin are destroyed by your body's immune system. Your body can no longer make insulin on its own. People who have type 1 diabetes must take insulin to manage it.  Type 2 diabetes  Type 2 diabetes occurs when the cells of your body cannot use insulin or glucose normally. Over time, your body cannot make enough insulin to meet your body's needs. This is the most common kind of diabetes.  You are more likely to develop type 2 diabetes if you:    Are overweight    Have high blood pressure    Have high cholesterol    Are not physically active    Have a family history of type 2 diabetes    Are , /, ,  American or     Are a woman who has given birth to a baby weighing over 9 pounds, or you have had gestational diabetes (diabetes that occurs in pregnancy).  For informational purposes only. Not to replace the advice of your health care provider.  Copyright   2006 [facility]. All rights reserved. [employer] 952667 - REV 12/15.    A1C  Does this test have other names?  Hemoglobin A1c; HbA1c; glycosylated hemoglobin; glycohemoglobin; Glycated hemoglobin  What is this test?  A1C is a blood test that shows average blood sugar (glucose) levels over the last 3 months. The test is done to find out if a person has diabetes or prediabetes. It's also used to see how well a person with diabetes controls their blood sugar. The test can help guide diabetes treatment over time.  Why do I need this test?  You may need this test to check for prediabetes or diabetes.  If you have diabetes or prediabetes, you may need this test to see how well you control your blood sugar. People with diabetes need to track their blood sugar (glucose) levels every day to make sure they aren t too high or too low. The A1C  test gives results for a longer period of time. It shows if your blood sugar has been too high on average over the last 3 months.   Glucose sticks to hemoglobin in the blood. Hemoglobin is a protein in red blood cells that carries oxygen. When blood sugar is high, more glucose builds up and sticks to the hemoglobin. The A1C test measures how much of the hemoglobin is coated with sugar.  You may have the test when a healthcare provider first works with you to treat your diabetes. You may then need to have the A1C test 2 or more times a year. This depends on the type of diabetes and how well it s controlled. The American Diabetes Association (ADA) advises an A1C test at least 2 times a year if you are meeting your blood sugar goals. If you aren t meeting your goals or your medicine has changed, you should have the A1C test more often.  What other tests might I have along with this test?   If your healthcare provider tests you for diabetes, you may also have any of these tests:    Fasting plasma glucose blood test (FPG)    Oral glucose tolerance test (OGTT)    Urine test to check for sugar, ketones, or protein  What do my test results mean?  Test results may vary depending on your age, gender, health history, the method used for the test, and other things. Your test results may not mean you have a problem. Ask your healthcare provider what your test results mean for you.   A1C results are reported as a percentage. Here are what the results mean:    A1C below 5.7%. This is normal.    A1C from 5.7% to 6.4%. You may have prediabetes. This means you have a higher risk for diabetes in the future.    A1C of 6.5% or above on 2 separate tests. You may have diabetes.   The ADA says that people with diabetes should keep an A1C below 7%. The American Association of Clinical Endocrinologists advises an A1C of 6.5% or less. Your healthcare provider may give you other advice. This is based on your age, health conditions, and other  things.    How is this test done?  The test is done with a blood sample. A needle is used to draw blood from a vein in your arm or hand.   Does this test pose any risks?  Having a blood test with a needle carries some risks. These include bleeding, infection, bruising, and feeling lightheaded. When the needle pricks your arm or hand, you may feel a slight sting or pain. Afterward, the site may be sore.  What might affect my test results?  Your blood sugar levels change throughout the day. This won't affect the A1C test result.  If you have sickle cell anemia or other blood disorders, an A1C test may be less useful for diagnosing or watching diabetes. Your healthcare provider may tell you to use a different test that will work better for you.  The test results may be less accurate if you have any of the below:    Anemia    Heavy bleeding    Iron deficiency    Kidney failure    Liver disease  How do I get ready for this test?  You don't need to get ready for the test.      2718-9043 [employer]. [address] [place] [address]. All rights reserved. This information is not intended as a substitute for professional medical care. Always follow your healthcare professional's instructions.        Diet: Diabetes  Food is an important tool that you can use to control diabetes and stay healthy. Eating well-balanced meals in the correct amounts will help you control your blood glucose levels and prevent low blood sugar reactions. It will also help you reduce the health risks of diabetes. There is no one specific diet that is right for everyone with diabetes. But there are general guidelines to follow. A registered dietitian (RD) will create a tailored diet approach that s just right for you. He or she will also help you plan healthy meals and snacks. If you have any questions, call your dietitian for advice.     Guidelines for success  Talk with your healthcare provider before starting a diabetes diet  or weight loss program. If you haven't talked with a dietitian yet, ask your provider for a referral. The following guidelines can help you succeed:    Select foods from the 6 food groups below. Your dietitian will help you find food choices within each group. He or she will also show you serving sizes and how many servings you can have at each meal.    Grains, beans, and starchy vegetables    Vegetables    Fruit    Milk or yogurt    Meat, poultry, fish, or tofu    Healthy fats    Check your blood sugar levels as directed by your provider. Take any medicine as prescribed by your provider.    Learn to read food labels and pick the right portion sizes.    Eat only the amount of food in your meal plan. Eat about the same amount of food at regular times each day. Don t skip meals. Eat meals 4 to 5 hours apart, with snacks in between.    Limit alcohol. It raises blood sugar levels. Drink water or calorie-free diet drinks that use safe sweeteners.    Eat less fat to help lower your risk of heart disease. Use nonfat or low-fat dairy products and lean meats. Avoid fried foods. Use cooking oils that are unsaturated, such as olive, canola, or peanut oil.    Talk with your dietitian about safe sugar substitutes.    Avoid added salt. It can contribute to high blood pressure, which can cause heart disease. People with diabetes already have a risk of high blood pressure and heart disease.    Stay at a healthy weight. If you need to lose weight, cut down on your portion sizes. But don t skip meals. Exercise is an important part of any weight management program. Talk with your provider about an exercise program that s right for you.    For more information about the best diet plan for you, talk with a registered dietitian (RD). To find an RD in your area, contact:    Academy of Nutrition and Dietetics www.eatright.org    The American Diabetes Association [phone] www.diabetes.org  Date Last Reviewed: 8/1/2016 2000-2017 The  [employer]. [address] [place] [address]. All rights reserved. This information is not intended as a substitute for professional medical care. Always follow your healthcare professional's instructions.        Diabetes: Exams and Tests    For your diabetes care, you may see your primary care provider or a specialist 2 to 4 times a year. This page lists some of the regular exams and tests recommended for people with diabetes. To learn more, contact the American Diabetes Association ([phone], www.diabetes.org).  Tests and immunizations  These should be done at least as often as stated below:    Blood pressure check: every healthcare provider visit    A1C: at first, every 3 months; if controlled, then every 3 to 6 months     Cholesterol and blood lipid tests: at least every 12 months.    Urine tests for kidney function: every 12 months    Flu shots: once a year    Pneumonia shots: talk with your healthcare provider about which pneumonia vaccines are right for you    Hepatitis B shots: as soon as possible if you re under 60, or as advised by your healthcare provider if you re older than 60    Shingles vaccine after age 60, even if you have already had shingles     Other tests or vaccines: as advised by your healthcare provider    Individualized medical nutrition therapy: at least once, then as needed    Stop smoking counseling, if you still smoke, at each visit   Regular exams  The following exams help keep you healthy:    Foot exams. Nerve and blood vessel problems can affect your feet sooner than other parts of your body. Make sure that your healthcare provider checks your feet at every office visit.    Eye exams. You can have problems with your eyes even if you don t have trouble seeing. An ophthalmologist (eye healthcare provider) or specially trained optometrist will give you a dilated eye exam at least once a year. If you see dark spots, see poorly in dim light, have eye pain or  "pressure, or notice any other problems, tell your healthcare provider right away.    Dental exams. Gum disease (also called periodontal disease) and other mouth problems are common in people with diabetes. To help prevent these problems, see your dentist two or more times a year.  Ask your healthcare provider what other exams you ll need on a regular basis.  Date Last Reviewed: 6/1/2016 2000-2017 [employer]. [address] [place] [address], [place] [address]. All rights reserved. This information is not intended as a substitute for professional medical care. Always follow your healthcare professional's instructions.                Follow-ups after your visit        Future tests that were ordered for you today     Open Future Orders        Priority Expected Expires Ordered    **A1C FUTURE 3mo Routine 4/15/2018 5/15/2018 1/15/2018    Lipid panel reflex to direct LDL Fasting Routine 1/15/2018 1/15/2019 1/15/2018            Who to contact     If you have questions or need follow up information about today's clinic visit or your schedule please contact [facility] directly at [phone].  Normal or non-critical lab and imaging results will be communicated to you by [employer], letter or phone within 4 business days after the clinic has received the results. If you do not hear from us within 7 days, please contact the clinic through [employer] or phone. If you have a critical or abnormal lab result, we will notify you by phone as soon as possible.  Submit refill requests through [employer] or call your pharmacy and they will forward the refill request to us. Please allow 3 business days for your refill to be completed.          Additional Information About Your Visit        [employer] Information     [employer] lets you send messages to your doctor, view your test results, renew your prescriptions, schedule appointments and more. To sign up, go to [email or web address] . Click on \"Log in\" on the left side of " "the screen, which will take you to the Welcome page. Then click on \"Sign up Now\" on the right side of the page.     You will be asked to enter the access code listed below, as well as some personal information. Please follow the directions to create your username and password.     Your access code is: [email or web address]  Expires: 4/10/2018  5:05 PM     Your access code will  in 90 days. If you need help or a new code, please call your [facility] clinic or [phone].        Care EveryWhere ID     This is your Care EveryWhere ID. This could be used by other organizations to access your [facility] medical records  [id number]        Your Vitals Were     Pulse Temperature Height Pulse Oximetry BMI (Body Mass Index)       79 98.1  F (36.7  C) (Tympanic) 6' (1.829 m) 96% 35.15 kg/m2        Blood Pressure from Last 3 Encounters:   01/15/18 (!) 161/97   01/10/18 (!) 147/109   17 (!) 134/95    Weight from Last 3 Encounters:   01/15/18 259 lb 3.2 oz (117.6 kg)   01/10/18 259 lb 12.8 oz (117.8 kg)   17 259 lb 4.2 oz (117.6 kg)              We Performed the Following     ADMIN 1st VACCINE     Albumin Random Urine Quantitative with Creat Ratio     SCREENING QUESTIONS FOR ADULT IMMUNIZATIONS     TDAP VACCINE (ADACEL)          Today's Medication Changes          These changes are accurate as of: 1/15/18  6:54 PM.  If you have any questions, ask your nurse or doctor.               Start taking these medicines.        Dose/Directions    blood glucose lancets standard   Commonly known as:  no brand specified   Used for:  Type 2 diabetes mellitus without complication, without long-term current use of insulin (H)   Started by:  Vinh Ortega MD        Use to test blood sugar 1 times daily or as directed.   Quantity:  100 each   Refills:  11       blood glucose monitoring meter device kit   Commonly known as:  no brand specified   Used for:  Type 2 diabetes mellitus without complication, without long-term current use of " insulin (H)   Started by:  Vinh Ortega MD        Use to test blood sugar 1 times daily or as directed.   Quantity:  1 kit   Refills:  0       blood glucose monitoring test strip   Commonly known as:  no brand specified   Used for:  Type 2 diabetes mellitus without complication, without long-term current use of insulin (H)   Started by:  Vinh Ortega MD        Use to test blood sugars 1 times daily or as directed   Quantity:  100 strip   Refills:  3            Where to get your medicines      These medications were sent to [facility] [address] [facility]  [address] [facility] [address]     Phone:  [phone]     blood glucose lancets standard    blood glucose monitoring meter device kit    blood glucose monitoring test strip                Primary Care Provider Office Phone # Fax #    Carin PepeDO [phone] [address] [facility] [address]        Equal Access to Services     ADI PLATT : Hadii aad ku hadasho Soomaali, waaxda luqadaha, qaybta kaalmada adeegyada, waxay idiin hayaan desi diamond . So [facility] [phone].    ATENCIÓN: Si habla español, tiene a smith disposición servicios gratuitos de asistencia lingüística. Viry al [phone].    We comply with applicable federal civil rights laws and [place] laws. We do not discriminate on the basis of race, color, national origin, age, disability, sex, sexual orientation, or gender identity.            Thank you!     Thank you for choosing [facility]  for your care. Our goal is always to provide you with excellent care. Hearing back from our patients is one way we can continue to improve our services. Please take a few minutes to complete the written survey that you may receive in the mail after your visit with us. Thank you!             Your Updated Medication List - Protect others around you: Learn how to safely use, store and throw away your medicines at  www.disposemymeds.org.          This list is accurate as of: 1/15/18  6:54 PM.  Always use your most recent med list.                   Brand Name Dispense Instructions for use Diagnosis    albuterol 108 (90 BASE) MCG/ACT Inhaler    PROAIR HFA/PROVENTIL HFA/VENTOLIN HFA    1 Inhaler    Inhale 2 puffs into the lungs every 4 hours as needed for shortness of breath / dyspnea    Mild persistent asthma without complication       azelastine 0.1 % spray    ASTELIN    30 mL    Spray 2 sprays into both nostrils 2 times daily as needed    Cough       blood glucose lancets standard    no brand specified    100 each    Use to test blood sugar 1 times daily or as directed.    Type 2 diabetes mellitus without complication, without long-term current use of insulin (H)       blood glucose monitoring meter device kit    no brand specified    1 kit    Use to test blood sugar 1 times daily or as directed.    Type 2 diabetes mellitus without complication, without long-term current use of insulin (H)       blood glucose monitoring test strip    no brand specified    100 strip    Use to test blood sugars 1 times daily or as directed    Type 2 diabetes mellitus without complication, without long-term current use of insulin (H)       fluticasone-salmeterol 250-50 MCG/DOSE diskus inhaler    ADVAIR    1 Inhaler    Inhale 1 puff into the lungs 2 times daily    Bronchospasm       losartan 50 MG tablet    COZAAR    135 tablet    Take 1.5 tablets (75 mg) by mouth daily This is a new dose as of today.    Essential hypertension       methylPREDNISolone 4 MG tablet    MEDROL DOSEPAK    21 tablet    Follow package instructions    Dizziness

## 2018-01-16 NOTE — NURSING NOTE
Chief Complaint   Patient presents with     Consult     diabetes        Initial BP (!) 161/97 (BP Location: Left arm, Patient Position: Chair, Cuff Size: Adult Regular)  Pulse 79  Temp 98.1  F (36.7  C) (Tympanic)  Ht 6' (1.829 m)  Wt 259 lb 3.2 oz (117.6 kg)  SpO2 96%  BMI 35.15 kg/m2 Estimated body mass index is 35.15 kg/(m^2) as calculated from the following:    Height as of this encounter: 6' (1.829 m).    Weight as of this encounter: 259 lb 3.2 oz (117.6 kg).  Medication Reconciliation: jonn CLARK CMA (AAMA)

## 2018-01-16 NOTE — PATIENT INSTRUCTIONS
Return to have a fasting blood draw (8 hours) to check the cholesterol.  Schedule a diabetic eye exam every year.    Recheck A1C in 3 months    What Is Diabetes?  If you have diabetes, your body does not make enough insulin (a hormone), or the insulin it makes does not work the way it should. Diabetes is a lifelong disease.  Glucose (sugar) is your body's main source of energy. Insulin carries glucose from the bloodstream into your body's cells. But if you have diabetes, glucose builds up in your blood. This is called high blood glucose (high blood sugar).  High blood glucose can lead to damage in many parts of your body, including your eyes, kidneys, heart, blood vessels, nerves and skin.  What are the symptoms of diabetes?  Symptoms include:    Extreme thirst    Needing to urinate more often    Headache    Hunger    Blurred vision    Feeling drowsy or tired    Slow healing after an illness or injury    Frequent infections.  What should I do if I have diabetes?  Your first step is to learn how to manage your diabetes. The goal is to keep your blood glucose as close to normal as possible. (A normal level is 70 to 100.) By doing this, you can prevent or control damage to your body.  To manage your diabetes, you will need to:    Eat a wide range of healthy foods.    Manage your weight.    Be physically active.    Test your blood glucose as prescribed.    Control your blood pressure and cholesterol.    Take your medicines as prescribed.  Are there different kinds of diabetes?  There are two basic kinds of diabetes: type 1 and type 2.  Type 1 diabetes  Type 1 diabetes occurs when the cells that make insulin are destroyed by your body's immune system. Your body can no longer make insulin on its own. People who have type 1 diabetes must take insulin to manage it.  Type 2 diabetes  Type 2 diabetes occurs when the cells of your body cannot use insulin or glucose normally. Over time, your body cannot make enough insulin to  meet your body's needs. This is the most common kind of diabetes.  You are more likely to develop type 2 diabetes if you:    Are overweight    Have high blood pressure    Have high cholesterol    Are not physically active    Have a family history of type 2 diabetes    Are , /, ,  American or     Are a woman who has given birth to a baby weighing over 9 pounds, or you have had gestational diabetes (diabetes that occurs in pregnancy).  For informational purposes only. Not to replace the advice of your health care provider.  Copyright   2006 [facility]. All rights reserved. [employer] 177431 - REV 12/15.    A1C  Does this test have other names?  Hemoglobin A1c; HbA1c; glycosylated hemoglobin; glycohemoglobin; Glycated hemoglobin  What is this test?  A1C is a blood test that shows average blood sugar (glucose) levels over the last 3 months. The test is done to find out if a person has diabetes or prediabetes. It's also used to see how well a person with diabetes controls their blood sugar. The test can help guide diabetes treatment over time.  Why do I need this test?  You may need this test to check for prediabetes or diabetes.  If you have diabetes or prediabetes, you may need this test to see how well you control your blood sugar. People with diabetes need to track their blood sugar (glucose) levels every day to make sure they aren t too high or too low. The A1C test gives results for a longer period of time. It shows if your blood sugar has been too high on average over the last 3 months.   Glucose sticks to hemoglobin in the blood. Hemoglobin is a protein in red blood cells that carries oxygen. When blood sugar is high, more glucose builds up and sticks to the hemoglobin. The A1C test measures how much of the hemoglobin is coated with sugar.  You may have the test when a healthcare provider first works with you to treat your diabetes. You  may then need to have the A1C test 2 or more times a year. This depends on the type of diabetes and how well it s controlled. The American Diabetes Association (ADA) advises an A1C test at least 2 times a year if you are meeting your blood sugar goals. If you aren t meeting your goals or your medicine has changed, you should have the A1C test more often.  What other tests might I have along with this test?   If your healthcare provider tests you for diabetes, you may also have any of these tests:    Fasting plasma glucose blood test (FPG)    Oral glucose tolerance test (OGTT)    Urine test to check for sugar, ketones, or protein  What do my test results mean?  Test results may vary depending on your age, gender, health history, the method used for the test, and other things. Your test results may not mean you have a problem. Ask your healthcare provider what your test results mean for you.   A1C results are reported as a percentage. Here are what the results mean:    A1C below 5.7%. This is normal.    A1C from 5.7% to 6.4%. You may have prediabetes. This means you have a higher risk for diabetes in the future.    A1C of 6.5% or above on 2 separate tests. You may have diabetes.   The ADA says that people with diabetes should keep an A1C below 7%. The American Association of Clinical Endocrinologists advises an A1C of 6.5% or less. Your healthcare provider may give you other advice. This is based on your age, health conditions, and other things.    How is this test done?  The test is done with a blood sample. A needle is used to draw blood from a vein in your arm or hand.   Does this test pose any risks?  Having a blood test with a needle carries some risks. These include bleeding, infection, bruising, and feeling lightheaded. When the needle pricks your arm or hand, you may feel a slight sting or pain. Afterward, the site may be sore.  What might affect my test results?  Your blood sugar levels change throughout the  day. This won't affect the A1C test result.  If you have sickle cell anemia or other blood disorders, an A1C test may be less useful for diagnosing or watching diabetes. Your healthcare provider may tell you to use a different test that will work better for you.  The test results may be less accurate if you have any of the below:    Anemia    Heavy bleeding    Iron deficiency    Kidney failure    Liver disease  How do I get ready for this test?  You don't need to get ready for the test.      0571-9223 [employer]. [address] [place] [address]. All rights reserved. This information is not intended as a substitute for professional medical care. Always follow your healthcare professional's instructions.        Diet: Diabetes  Food is an important tool that you can use to control diabetes and stay healthy. Eating well-balanced meals in the correct amounts will help you control your blood glucose levels and prevent low blood sugar reactions. It will also help you reduce the health risks of diabetes. There is no one specific diet that is right for everyone with diabetes. But there are general guidelines to follow. A registered dietitian (RD) will create a tailored diet approach that s just right for you. He or she will also help you plan healthy meals and snacks. If you have any questions, call your dietitian for advice.     Guidelines for success  Talk with your healthcare provider before starting a diabetes diet or weight loss program. If you haven't talked with a dietitian yet, ask your provider for a referral. The following guidelines can help you succeed:    Select foods from the 6 food groups below. Your dietitian will help you find food choices within each group. He or she will also show you serving sizes and how many servings you can have at each meal.    Grains, beans, and starchy vegetables    Vegetables    Fruit    Milk or yogurt    Meat, poultry, fish, or tofu    Healthy  fats    Check your blood sugar levels as directed by your provider. Take any medicine as prescribed by your provider.    Learn to read food labels and pick the right portion sizes.    Eat only the amount of food in your meal plan. Eat about the same amount of food at regular times each day. Don t skip meals. Eat meals 4 to 5 hours apart, with snacks in between.    Limit alcohol. It raises blood sugar levels. Drink water or calorie-free diet drinks that use safe sweeteners.    Eat less fat to help lower your risk of heart disease. Use nonfat or low-fat dairy products and lean meats. Avoid fried foods. Use cooking oils that are unsaturated, such as olive, canola, or peanut oil.    Talk with your dietitian about safe sugar substitutes.    Avoid added salt. It can contribute to high blood pressure, which can cause heart disease. People with diabetes already have a risk of high blood pressure and heart disease.    Stay at a healthy weight. If you need to lose weight, cut down on your portion sizes. But don t skip meals. Exercise is an important part of any weight management program. Talk with your provider about an exercise program that s right for you.    For more information about the best diet plan for you, talk with a registered dietitian (RD). To find an RD in your area, contact:    Academy of Nutrition and Dietetics www.eatright.org    The American Diabetes Association [phone] www.diabetes.org  Date Last Reviewed: 8/1/2016 2000-2017 [employer]. [address] [place] [address]. All rights reserved. This information is not intended as a substitute for professional medical care. Always follow your healthcare professional's instructions.        Diabetes: Exams and Tests    For your diabetes care, you may see your primary care provider or a specialist 2 to 4 times a year. This page lists some of the regular exams and tests recommended for people with diabetes. To learn more, contact the  American Diabetes Association (645-618-2674, www.diabetes.org).  Tests and immunizations  These should be done at least as often as stated below:    Blood pressure check: every healthcare provider visit    A1C: at first, every 3 months; if controlled, then every 3 to 6 months     Cholesterol and blood lipid tests: at least every 12 months.    Urine tests for kidney function: every 12 months    Flu shots: once a year    Pneumonia shots: talk with your healthcare provider about which pneumonia vaccines are right for you    Hepatitis B shots: as soon as possible if you re under 60, or as advised by your healthcare provider if you re older than 60    Shingles vaccine after age 60, even if you have already had shingles     Other tests or vaccines: as advised by your healthcare provider    Individualized medical nutrition therapy: at least once, then as needed    Stop smoking counseling, if you still smoke, at each visit   Regular exams  The following exams help keep you healthy:    Foot exams. Nerve and blood vessel problems can affect your feet sooner than other parts of your body. Make sure that your healthcare provider checks your feet at every office visit.    Eye exams. You can have problems with your eyes even if you don t have trouble seeing. An ophthalmologist (eye healthcare provider) or specially trained optometrist will give you a dilated eye exam at least once a year. If you see dark spots, see poorly in dim light, have eye pain or pressure, or notice any other problems, tell your healthcare provider right away.    Dental exams. Gum disease (also called periodontal disease) and other mouth problems are common in people with diabetes. To help prevent these problems, see your dentist two or more times a year.  Ask your healthcare provider what other exams you ll need on a regular basis.  Date Last Reviewed: 6/1/2016 2000-2017 [employer]. [address] [place] [address], [place] [address]. All rights  reserved. This information is not intended as a substitute for professional medical care. Always follow your healthcare professional's instructions.

## 2018-01-16 NOTE — PROGRESS NOTES
SUBJECTIVE:   Ilya Villagran is a 47 year old male who presents to clinic today for the following health issues:  Chief Complaint   Patient presents with     Consult     diabetes      Imm/Inj     Tdap       I saw Ilya last week for dizziness and we ran some labs that revealed a new diagnosis of diabetes with an A1C of 6.7.  We started some steroids for possible vestibular neuritis, and his symptoms of dizziness have overall improved.  He presents today with his wife to discuss the new diagnosis of diabetes.  He does endorse eating a diet fairly high in sugars and carbs.     Problem list and histories reviewed & adjusted, as indicated.  Additional history: as documented    Reviewed and updated as needed this visit by clinical staff  Tobacco  Allergies  Med Hx  Surg Hx  Fam Hx  Soc Hx      Reviewed and updated as needed this visit by Provider         OBJECTIVE:     BP (!) 161/97 (BP Location: Left arm, Patient Position: Chair, Cuff Size: Adult Regular)  Pulse 79  Temp 98.1  F (36.7  C) (Tympanic)  Ht 6' (1.829 m)  Wt 259 lb 3.2 oz (117.6 kg)  SpO2 96%  BMI 35.15 kg/m2  Body mass index is 35.15 kg/(m^2).  GENERAL: healthy, alert and no distress  Diabetic foot exam: normal DP and PT pulses, no trophic changes or ulcerative lesions and normal monofilament exam      ASSESSMENT/PLAN:         1. Type 2 diabetes mellitus without complication, without long-term current use of insulin (H)    We reviewed the pathophysiology, diagnosis, monitoring and recommended tests and vaccines, and lifestyle treatment of diabetes in detail.  He and his wife are highly motivated to make lifestyle change and his A1C is 6.7, so I did not feel that we needed to start medication at this point because I expect he will be successful at controlling the diabetes with lifestyle measures at this point.  Offered consult to [facility] for detailed nutrition counseling, but his wife has been researching online extensively and feels she found enough  information already.  We did a foot exam in clinic today and I advised him of the need for yearly eye exams.  He declined flu and pneumonia vaccines.  Will get a microalbumin and lipid panel- he is over 40 so at least a moderate intensity statin will be indicated based on his diabetes alone.  Will plan to recheck A1C in 3 months.      - **A1C FUTURE 3mo; Future  - Lipid panel reflex to direct LDL Fasting; Future  - blood glucose monitoring (NO BRAND SPECIFIED) meter device kit; Use to test blood sugar 1 times daily or as directed.  Dispense: 1 kit; Refill: 0  - blood glucose (NO BRAND SPECIFIED) lancets standard; Use to test blood sugar 1 times daily or as directed.  Dispense: 100 each; Refill: 11  - blood glucose monitoring (NO BRAND SPECIFIED) test strip; Use to test blood sugars 1 times daily or as directed  Dispense: 100 strip; Refill: 3  - Albumin Random Urine Quantitative with Creat Ratio; Future    2. Need for diphtheria-tetanus-pertussis (Tdap) vaccine    - TDAP VACCINE (ADACEL)  - ADMIN 1st VACCINE  - SCREENING QUESTIONS FOR ADULT IMMUNIZATIONS    More than 50% of this 40 minute face-to-face appointment was spent on counseling and coordination of care of newly diagnosed diabetes.      Vinh Ortega MD  [facility]

## 2018-02-07 ENCOUNTER — TELEPHONE (OUTPATIENT)
Dept: FAMILY MEDICINE | Facility: CLINIC | Age: 48
End: 2018-02-07

## 2018-02-07 NOTE — LETTER
February 7, 2018      Ilya Villagran  [id number] [facility] [id number]        Dear Ilya,     Your most recent blood pressure reading preformed at our clinic was higher than we like to see it. The goal is to have it under 140/90 in clinic. Please call our clinic [phone] to schedule a blood pressure recheck on our RN schedule. This appointment is free of charge and takes about 15 minutes to complete. Be sure to take all of your blood pressure medications, and avoid stimulants like caffeine, cold medicines, sudafed, or tobacco prior to your recheck.     If your blood pressure medication was changed by your provider recently wait 2 weeks before making this recheck appointment.     You are also due to complete the asthma control test or ACT.  An ACT in enclose w/ this mailing, please complete and mail back in addressed stamped envelope.    Thank you for trusting us with your health care.         Sincerely,        Vinh Ortega MD/[facility]

## 2018-02-07 NOTE — TELEPHONE ENCOUNTER
Panel Management Review      Patient has the following on his problem list:     Asthma review     ACT Total Scores 8/28/2017   ACT TOTAL SCORE (Goal Greater than or Equal to 20) 21   In the past 12 months, how many times did you visit the emergency room for your asthma without being admitted to the hospital? 0   In the past 12 months, how many times were you hospitalized overnight because of your asthma? 0      1. Is Asthma diagnosis on the Problem List? Yes    2. Is Asthma listed on Health Maintenance? Yes    3. Patient is due for:  ACT    Diabetes    ASA:     Last A1C  Lab Results   Component Value Date    A1C 6.7 01/10/2018    A1C 6.0 08/22/2017     A1C tested: MONITOR    Last LDL:    No results found for: CHOL  No results found for: HDL  No results found for: LDL  No results found for: TRIG  No results found for: CHOLHDLRATIO  No results found for: NHDL    Is the patient on a Statin? NO             Is the patient on Aspirin? NO        Last three blood pressure readings:  BP Readings from Last 3 Encounters:   01/15/18 (!) 161/97   01/10/18 (!) 147/109   08/28/17 (!) 134/95       Date of last diabetes office visit: 1/10/18     Tobacco History:     History   Smoking Status     Never Smoker   Smokeless Tobacco     Never Used         Hypertension   Last three blood pressure readings:  BP Readings from Last 3 Encounters:   01/15/18 (!) 161/97   01/10/18 (!) 147/109   08/28/17 (!) 134/95     Blood pressure: FAILED    HTN Guidelines:  Age 18-59 BP range:  Less than 140/90  Age 60-85 with Diabetes:  Less than 140/90  Age 60-85 without Diabetes:  less than 150/90      Composite cancer screening  Chart review shows that this patient is due/due soon for the following None  Summary:    Patient is due/failing the following:   ACT and BP CHECK    Action needed:   Patient needs to do ACT. and Patient needs nurse only appointment.    Type of outreach:    Sent letter. and Copy of ACT mailed to patient, will reach out in 5  days.    Questions for provider review:    None                                                                                                                                    Jazmyn CLARK CMA ([facility])       Chart routed to none .

## 2018-04-19 ENCOUNTER — TELEPHONE (OUTPATIENT)
Dept: FAMILY MEDICINE | Facility: CLINIC | Age: 48
End: 2018-04-19

## 2018-04-25 ENCOUNTER — HOSPITAL ENCOUNTER (EMERGENCY)
Facility: CLINIC | Age: 48
Discharge: HOME | End: 2018-04-25
Attending: EMERGENCY MEDICINE | Admitting: EMERGENCY MEDICINE
Payer: COMMERCIAL

## 2018-04-25 ENCOUNTER — APPOINTMENT (OUTPATIENT)
Dept: GENERAL RADIOLOGY | Facility: CLINIC | Age: 48
End: 2018-04-25
Attending: EMERGENCY MEDICINE
Payer: COMMERCIAL

## 2018-04-25 VITALS
WEIGHT: 235 LBS | DIASTOLIC BLOOD PRESSURE: 86 MMHG | HEIGHT: 72 IN | OXYGEN SATURATION: 98 % | SYSTOLIC BLOOD PRESSURE: 136 MMHG | BODY MASS INDEX: 31.83 KG/M2 | RESPIRATION RATE: 16 BRPM | TEMPERATURE: 98.3 F

## 2018-04-25 DIAGNOSIS — G89.29 CHRONIC NECK PAIN: ICD-10-CM

## 2018-04-25 DIAGNOSIS — R05.3 CHRONIC COUGH: ICD-10-CM

## 2018-04-25 DIAGNOSIS — R42 DIZZINESS: ICD-10-CM

## 2018-04-25 DIAGNOSIS — M54.2 CHRONIC NECK PAIN: ICD-10-CM

## 2018-04-25 LAB
ALBUMIN SERPL-MCNC: 3.9 G/DL (ref 3.4–5)
ALBUMIN UR-MCNC: NEGATIVE MG/DL
ALP SERPL-CCNC: 60 U/L (ref 40–150)
ALT SERPL W P-5'-P-CCNC: 37 U/L (ref 0–70)
AMPHETAMINES UR QL SCN: NEGATIVE
ANION GAP SERPL CALCULATED.3IONS-SCNC: 4 MMOL/L (ref 3–14)
APPEARANCE UR: CLEAR
AST SERPL W P-5'-P-CCNC: 18 U/L (ref 0–45)
BARBITURATES UR QL: NEGATIVE
BASOPHILS # BLD AUTO: 0 10E9/L (ref 0–0.2)
BASOPHILS NFR BLD AUTO: 0.8 %
BENZODIAZ UR QL: NEGATIVE
BILIRUB SERPL-MCNC: 0.5 MG/DL (ref 0.2–1.3)
BILIRUB UR QL STRIP: NEGATIVE
BUN SERPL-MCNC: 22 MG/DL (ref 7–30)
CALCIUM SERPL-MCNC: 8.4 MG/DL (ref 8.5–10.1)
CANNABINOIDS UR QL SCN: NEGATIVE
CHLORIDE SERPL-SCNC: 107 MMOL/L (ref 94–109)
CO2 SERPL-SCNC: 27 MMOL/L (ref 20–32)
COCAINE UR QL: NEGATIVE
COLOR UR AUTO: YELLOW
CREAT SERPL-MCNC: 0.82 MG/DL (ref 0.66–1.25)
DIFFERENTIAL METHOD BLD: NORMAL
EOSINOPHIL # BLD AUTO: 0.1 10E9/L (ref 0–0.7)
EOSINOPHIL NFR BLD AUTO: 2.9 %
ERYTHROCYTE [DISTWIDTH] IN BLOOD BY AUTOMATED COUNT: 13.7 % (ref 10–15)
GFR SERPL CREATININE-BSD FRML MDRD: >90 ML/MIN/1.7M2
GLUCOSE SERPL-MCNC: 113 MG/DL (ref 70–99)
GLUCOSE UR STRIP-MCNC: NEGATIVE MG/DL
HCT VFR BLD AUTO: 40.5 % (ref 40–53)
HGB BLD-MCNC: 14.6 G/DL (ref 13.3–17.7)
HGB UR QL STRIP: NEGATIVE
IMM GRANULOCYTES # BLD: 0 10E9/L (ref 0–0.4)
IMM GRANULOCYTES NFR BLD: 0.8 %
INR PPP: 1.04 (ref 0.86–1.14)
KETONES UR STRIP-MCNC: 5 MG/DL
LEUKOCYTE ESTERASE UR QL STRIP: NEGATIVE
LYMPHOCYTES # BLD AUTO: 1 10E9/L (ref 0.8–5.3)
LYMPHOCYTES NFR BLD AUTO: 20.8 %
MCH RBC QN AUTO: 29.6 PG (ref 26.5–33)
MCHC RBC AUTO-ENTMCNC: 36 G/DL (ref 31.5–36.5)
MCV RBC AUTO: 82 FL (ref 78–100)
MONOCYTES # BLD AUTO: 0.4 10E9/L (ref 0–1.3)
MONOCYTES NFR BLD AUTO: 8.2 %
NEUTROPHILS # BLD AUTO: 3.2 10E9/L (ref 1.6–8.3)
NEUTROPHILS NFR BLD AUTO: 66.5 %
NITRATE UR QL: NEGATIVE
OPIATES UR QL SCN: NEGATIVE
PCP UR QL SCN: NEGATIVE
PH UR STRIP: 5 PH (ref 5–7)
PLATELET # BLD AUTO: 191 10E9/L (ref 150–450)
POTASSIUM SERPL-SCNC: 3.8 MMOL/L (ref 3.4–5.3)
PROT SERPL-MCNC: 7.7 G/DL (ref 6.8–8.8)
RBC # BLD AUTO: 4.94 10E12/L (ref 4.4–5.9)
SODIUM SERPL-SCNC: 138 MMOL/L (ref 133–144)
SOURCE: ABNORMAL
SP GR UR STRIP: 1.02 (ref 1–1.03)
TROPONIN I SERPL-MCNC: <0.015 UG/L (ref 0–0.04)
UROBILINOGEN UR STRIP-MCNC: 0 MG/DL (ref 0–2)
WBC # BLD AUTO: 4.8 10E9/L (ref 4–11)

## 2018-04-25 PROCEDURE — 81003 URINALYSIS AUTO W/O SCOPE: CPT | Mod: XU | Performed by: EMERGENCY MEDICINE

## 2018-04-25 PROCEDURE — 99285 EMERGENCY DEPT VISIT HI MDM: CPT | Mod: 25 | Performed by: EMERGENCY MEDICINE

## 2018-04-25 PROCEDURE — 86618 LYME DISEASE ANTIBODY: CPT | Performed by: EMERGENCY MEDICINE

## 2018-04-25 PROCEDURE — 25000131 ZZH RX MED GY IP 250 OP 636 PS 637: Performed by: EMERGENCY MEDICINE

## 2018-04-25 PROCEDURE — 85610 PROTHROMBIN TIME: CPT | Performed by: EMERGENCY MEDICINE

## 2018-04-25 PROCEDURE — 80307 DRUG TEST PRSMV CHEM ANLYZR: CPT | Performed by: EMERGENCY MEDICINE

## 2018-04-25 PROCEDURE — 93010 ELECTROCARDIOGRAM REPORT: CPT | Mod: Z6 | Performed by: EMERGENCY MEDICINE

## 2018-04-25 PROCEDURE — 93005 ELECTROCARDIOGRAM TRACING: CPT | Performed by: EMERGENCY MEDICINE

## 2018-04-25 PROCEDURE — 85025 COMPLETE CBC W/AUTO DIFF WBC: CPT | Performed by: EMERGENCY MEDICINE

## 2018-04-25 PROCEDURE — 71046 X-RAY EXAM CHEST 2 VIEWS: CPT

## 2018-04-25 PROCEDURE — 80053 COMPREHEN METABOLIC PANEL: CPT | Performed by: EMERGENCY MEDICINE

## 2018-04-25 PROCEDURE — 84484 ASSAY OF TROPONIN QUANT: CPT | Performed by: EMERGENCY MEDICINE

## 2018-04-25 RX ORDER — FLUTICASONE PROPIONATE 50 MCG
1-2 SPRAY, SUSPENSION (ML) NASAL DAILY
Qty: 1 BOTTLE | Refills: 11 | Status: SHIPPED | OUTPATIENT
Start: 2018-04-25 | End: 2020-11-30

## 2018-04-25 RX ORDER — GADOBUTROL 604.72 MG/ML
10 INJECTION INTRAVENOUS ONCE
Status: DISCONTINUED | OUTPATIENT
Start: 2018-04-25 | End: 2018-04-25 | Stop reason: HOSPADM

## 2018-04-25 RX ORDER — ACYCLOVIR 200 MG/1
60 CAPSULE ORAL ONCE
Status: DISCONTINUED | OUTPATIENT
Start: 2018-04-25 | End: 2018-04-25 | Stop reason: HOSPADM

## 2018-04-25 RX ORDER — SODIUM CHLORIDE 9 MG/ML
1000 INJECTION, SOLUTION INTRAVENOUS CONTINUOUS
Status: DISCONTINUED | OUTPATIENT
Start: 2018-04-25 | End: 2018-04-25 | Stop reason: HOSPADM

## 2018-04-25 RX ORDER — MECLIZINE HCL 12.5 MG 12.5 MG/1
25 TABLET ORAL 3 TIMES DAILY PRN
Qty: 30 TABLET | Refills: 0 | Status: SHIPPED | OUTPATIENT
Start: 2018-04-25 | End: 2020-05-08

## 2018-04-25 RX ORDER — MECLIZINE HYDROCHLORIDE 25 MG/1
25 TABLET ORAL ONCE
Status: COMPLETED | OUTPATIENT
Start: 2018-04-25 | End: 2018-04-25

## 2018-04-25 RX ADMIN — MECLIZINE 25 MG: 25 TABLET ORAL at 16:16

## 2018-04-25 NOTE — ED AVS SNAPSHOT
[facility] Emergency Department    [address] [facility] [address]    Phone:  [phone]    Fax:  [phone]                                       Ilya Villagran   MRN: [id number]    Department:  [facility] Emergency Department   Date of Visit:  4/25/2018           Patient Information     Date Of Birth          1970        Your diagnoses for this visit were:     Dizziness     Chronic neck pain     Chronic cough        You were seen by Zak Tang MD.      Follow-up Information     Follow up with Carin Pepe DO.    Specialty:  Internal Medicine    Why:  For your follow-up on Friday as planned.    Contact information:    [address] [facility] [address]  [phone]        Discharge References/Attachments     COUGH, CHRONIC, UNCERTAIN CAUSE (ADULT) (ENGLISH)    DIZZINESS, UNCERTAIN CAUSE (ENGLISH)      Your next 10 appointments already scheduled     Apr 27, 2018 11:00 AM CDT   SHORT with Carin Pepe DO   [facility]    [address] [facility] [address] [phone]              24 Hour Appointment Hotline       To make an appointment at any [facility], call [phone] ([phone]). If you don't have a family doctor or clinic, we will help you find one. [facility] (formerly [facility]) are conveniently located to serve the needs of you and your family.             Review of your medicines      START taking        Dose / Directions Last dose taken    fluticasone 50 MCG/ACT spray   Commonly known as:  FLONASE   Dose:  1-2 spray   Quantity:  1 Bottle        Spray 1-2 sprays into both nostrils daily   Refills:  11        meclizine 12.5 MG tablet   Commonly known as:  ANTIVERT   Dose:  25 mg   Quantity:  30 tablet        Take 2 tablets (25 mg) by mouth 3 times daily as needed for dizziness   Refills:  0          Our records show that you are taking the medicines listed below. If these are incorrect, please call your  family doctor or clinic.        Dose / Directions Last dose taken    albuterol 108 (90 Base) MCG/ACT Inhaler   Commonly known as:  PROAIR HFA/PROVENTIL HFA/VENTOLIN HFA   Dose:  2 puff   Quantity:  1 Inhaler        Inhale 2 puffs into the lungs every 4 hours as needed for shortness of breath / dyspnea   Refills:  3        azelastine 0.1 % spray   Commonly known as:  ASTELIN   Dose:  2 spray   Quantity:  30 mL        Spray 2 sprays into both nostrils 2 times daily as needed   Refills:  3        blood glucose lancets standard   Commonly known as:  no brand specified   Quantity:  100 each        Use to test blood sugar 1 times daily or as directed.   Refills:  11        blood glucose monitoring meter device kit   Commonly known as:  no brand specified   Quantity:  1 kit        Use to test blood sugar 1 times daily or as directed.   Refills:  0        blood glucose monitoring test strip   Commonly known as:  no brand specified   Quantity:  100 strip        Use to test blood sugars 1 times daily or as directed   Refills:  3        fluticasone-salmeterol 250-50 MCG/DOSE diskus inhaler   Commonly known as:  ADVAIR   Dose:  1 puff   Quantity:  1 Inhaler        Inhale 1 puff into the lungs 2 times daily   Refills:  11        losartan 50 MG tablet   Commonly known as:  COZAAR   Dose:  75 mg   Quantity:  135 tablet        Take 1.5 tablets (75 mg) by mouth daily This is a new dose as of today.   Refills:  3        methylPREDNISolone 4 MG tablet   Commonly known as:  MEDROL DOSEPAK   Quantity:  21 tablet        Follow package instructions   Refills:  0                Prescriptions were sent or printed at these locations (2 Prescriptions)                   [facility] [place], [place] - [address] [place], [place] [address] [facility] [address]    Telephone:  [phone]   Fax:  [phone]   Hours:                  E-Prescribed (2 of 2)         fluticasone (FLONASE) 50 MCG/ACT spray               meclizine (ANTIVERT)  12.5 MG tablet                Procedures and tests performed during your visit     CBC with platelets differential    Comprehensive metabolic panel    Drug abuse screen urine    EKG 12 lead    INR    Lyme Disease Kaylynn with reflex to WB Serum    Orthostatic blood pressure and pulse    Troponin I    UA reflex to Microscopic    XR Chest 2 Views      Orders Needing Specimen Collection     None      Pending Results     Date and Time Order Name Status Description    4/25/2018 1458 Drug abuse screen urine In process     4/25/2018 1440 Lyme Disease Kaylynn with reflex to WB Serum In process             Pending Culture Results     Date and Time Order Name Status Description    4/25/2018 1458 Drug abuse screen urine In process             Pending Results Instructions     If you had any lab results that were not finalized at the time of your Discharge, you can call the ED Lab Result RN at [phone]. You will be contacted by this team for any positive Lab results or changes in treatment. The nurses are available 7 days a week from 10A to 6:30P.  You can leave a message 24 hours per day and they will return your call.        Test Results From Your Hospital Stay        4/25/2018  2:45 PM      Component Results     Component Value Ref Range & Units Status    WBC 4.8 4.0 - 11.0 10e9/L Final    RBC Count 4.94 4.4 - 5.9 10e12/L Final    Hemoglobin 14.6 13.3 - 17.7 g/dL Final    Hematocrit 40.5 40.0 - 53.0 % Final    MCV 82 78 - 100 fl Final    MCH 29.6 26.5 - 33.0 pg Final    MCHC 36.0 31.5 - 36.5 g/dL Final    RDW 13.7 10.0 - 15.0 % Final    Platelet Count 191 150 - 450 10e9/L Final    Diff Method Automated Method  Final    % Neutrophils 66.5 % Final    % Lymphocytes 20.8 % Final    % Monocytes 8.2 % Final    % Eosinophils 2.9 % Final    % Basophils 0.8 % Final    % Immature Granulocytes 0.8 % Final    Absolute Neutrophil 3.2 1.6 - 8.3 10e9/L Final    Absolute Lymphocytes 1.0 0.8 - 5.3 10e9/L Final    Absolute Monocytes 0.4 0.0 - 1.3  10e9/L Final    Absolute Eosinophils 0.1 0.0 - 0.7 10e9/L Final    Absolute Basophils 0.0 0.0 - 0.2 10e9/L Final    Abs Immature Granulocytes 0.0 0 - 0.4 10e9/L Final         4/25/2018  2:49 PM      Component Results     Component Value Ref Range & Units Status    INR 1.04 0.86 - 1.14 Final         4/25/2018  2:53 PM      Component Results     Component Value Ref Range & Units Status    Sodium 138 133 - 144 mmol/L Final    Potassium 3.8 3.4 - 5.3 mmol/L Final    Chloride 107 94 - 109 mmol/L Final    Carbon Dioxide 27 20 - 32 mmol/L Final    Anion Gap 4 3 - 14 mmol/L Final    Glucose 113 (H) 70 - 99 mg/dL Final    Urea Nitrogen 22 7 - 30 mg/dL Final    Creatinine 0.82 0.66 - 1.25 mg/dL Final    GFR Estimate >90 >60 mL/min/1.7m2 Final    Non  GFR Calc    GFR Estimate If Black >90 >60 mL/min/1.7m2 Final    African American GFR Calc    Calcium 8.4 (L) 8.5 - 10.1 mg/dL Final    Bilirubin Total 0.5 0.2 - 1.3 mg/dL Final    Albumin 3.9 3.4 - 5.0 g/dL Final    Protein Total 7.7 6.8 - 8.8 g/dL Final    Alkaline Phosphatase 60 40 - 150 U/L Final    ALT 37 0 - 70 U/L Final    AST 18 0 - 45 U/L Final         4/25/2018  2:54 PM         4/25/2018  4:48 PM      Component Results     Component Value Ref Range & Units Status    Color Urine Yellow  Final    Appearance Urine Clear  Final    Glucose Urine Negative NEG^Negative mg/dL Final    Bilirubin Urine Negative NEG^Negative Final    Ketones Urine 5 (A) NEG^Negative mg/dL Final    Specific Gravity Urine 1.021 1.003 - 1.035 Final    Blood Urine Negative NEG^Negative Final    pH Urine 5.0 5.0 - 7.0 pH Final    Protein Albumin Urine Negative NEG^Negative mg/dL Final    Urobilinogen mg/dL 0.0 0.0 - 2.0 mg/dL Final    Nitrite Urine Negative NEG^Negative Final    Leukocyte Esterase Urine Negative NEG^Negative Final    Source Midstream Urine  Final         4/25/2018  4:40 PM         4/25/2018  3:28 PM      Component Results     Component Value Ref Range & Units Status     "Troponin I ES <0.015 0.000 - 0.045 ug/L Final    The 99th percentile for upper reference range is 0.045 ug/L.  Troponin values   in the range of 0.045 - 0.120 ug/L may be associated with risks of adverse   clinical events.                                   2018  5:00 PM      Narrative     CHEST TWO VIEWS  2018 4:55 PM     HISTORY: Chronic cough.    COMPARISON: 2017        Impression     IMPRESSION: Normal.    EJ SCHWAB MD                Thank you for choosing [facility]       Thank you for choosing [facility] for your care. Our goal is always to provide you with excellent care. Hearing back from our patients is one way we can continue to improve our services. Please take a few minutes to complete the written survey that you may receive in the mail after you visit with us. Thank you!        [employer] Information     [employer] lets you send messages to your doctor, view your test results, renew your prescriptions, schedule appointments and more. To sign up, go to [email or web address] . Click on \"Log in\" on the left side of the screen, which will take you to the Welcome page. Then click on \"Sign up Now\" on the right side of the page.     You will be asked to enter the access code listed below, as well as some personal information. Please follow the directions to create your username and password.     Your access code is: [email or web address]  Expires: 2018  5:18 PM     Your access code will  in 90 days. If you need help or a new code, please call your [facility] clinic or [phone].        Care EveryWhere ID     This is your Care EveryWhere ID. This could be used by other organizations to access your [facility] medical records  [id number]        Equal Access to Services     CHUY PLATT : Hadii alice Bautista, guanaco delarosa, william headley. So [facility] [phone].    ATENCIÓN: Si habla español, tiene a smith disposición servicios gratuitos de " asistencia lingüística. Viry al [phone].    We comply with applicable federal civil rights laws and [place] laws. We do not discriminate on the basis of race, color, national origin, age, disability, sex, sexual orientation, or gender identity.            After Visit Summary       This is your record. Keep this with you and show to your community pharmacist(s) and doctor(s) at your next visit.

## 2018-04-25 NOTE — ED PROVIDER NOTES
"  History     Chief Complaint   Patient presents with     Dizziness     episodes of dizziness for past month, neck pain, worsening     HPI  Ilya Villagran is a 47 year old male who presents with complaints of ongoing issues with lightheadedness and dizziness.  He states symptoms worse today so he presented for evaluation.  States he has had these symptoms for at least 6 months.  He has been seen in primary care and they thought it was an inner ear issue and treating him with steroids.  He did not think that benefited a great deal.  He now presents with lightheadedness and \"balance problems.\"  He denies any focal motor weakness or new sensory changes.  He has a hard time concentrating at work but denies headache.  He works as a  and states that his vision for fine print has worsened over the last year but he has not seen a optometrist or ophthalmologist.  Did get some readers and that seemed to help.  Few days ago he did experience muffled hearing in what he thinks was both ears and had dizziness at that time.  He has had no CNS imaging.  Personal or family history of stroke.  Noted to be borderline diabetic recently but states he has lost 25 pounds and is due to have a recheck with an A1c on Friday.  His concerns are chronic neck pain which she has had for months and thinks they may have contributed to his symptoms.  Also concerns of Lyme's disease as he has a \"farm\".  Does not recall a tick bite or rash.  He also has had years of chronic nonproductive cough.  He has had workup that included trial of reflux meds, methacholine challenge, change with his blood pressure meds first with lisinopril which worsened the cough and is currently on Cozaar.  This also can cause coughing greater than 10% of the patient's.  He also had allergy skin testing.  Not seen pulmonology or ENT.  He has diagnosis of mild persistent asthma.  Is not on inhalers.  He denies use of tobacco products.  He denies personal cardiac " history.  He has no associated chest pain or shortness of breath with these episodes.  Denies any abdominal pain, nausea or vomiting.  Denies any focal motor weakness or sensory changes.  No gait disturbance.  Denies recent fall or head injury.  No radicular arm pain with his neck discomfort.  Patient states he went to a chiropractor and had 10 treatments and stated he had benefit briefly thereafter but not resolution.    Problem List:    Patient Active Problem List    Diagnosis Date Noted     Type 2 diabetes mellitus without complication, without long-term current use of insulin (H) 01/12/2018     Priority: Medium     Mild persistent asthma without complication 07/24/2017     Priority: Medium     Hyperglycemia 07/20/2017     Priority: Medium     Morbid obesity due to excess calories (H) 07/20/2017     Priority: Medium     Essential hypertension 07/19/2017     Priority: Medium     Bronchospasm 08/27/2015     Priority: Medium        Past Medical History:    Past Medical History:   Diagnosis Date     Type 2 diabetes mellitus without complication, without long-term current use of insulin (H) 1/12/2018       Past Surgical History:    Past Surgical History:   Procedure Laterality Date     LAPAROSCOPIC APPENDECTOMY  12/18/2011    Procedure:LAPAROSCOPIC APPENDECTOMY; Surgeon:JEAN CLAUDE LUNA; Location:[facility] OR       Family History:    Family History   Problem Relation Age of Onset     DIABETES Mother      Asthma Mother      Depression Brother      Myocardial Infarction Brother      Hypertension Cousin        Social History:  Marital Status:   [2]  Social History   Substance Use Topics     Smoking status: Never Smoker     Smokeless tobacco: Never Used     Alcohol use Yes      Comment: 12 pack per week        Medications:      fluticasone (FLONASE) 50 MCG/ACT spray   meclizine (ANTIVERT) 12.5 MG tablet   albuterol (PROAIR HFA/PROVENTIL HFA/VENTOLIN HFA) 108 (90 BASE) MCG/ACT Inhaler   azelastine (ASTELIN) 0.1 %  spray   blood glucose (NO BRAND SPECIFIED) lancets standard   blood glucose monitoring (NO BRAND SPECIFIED) meter device kit   blood glucose monitoring (NO BRAND SPECIFIED) test strip   fluticasone-salmeterol (ADVAIR) 250-50 MCG/DOSE diskus inhaler   losartan (COZAAR) 50 MG tablet   methylPREDNISolone (MEDROL DOSEPAK) 4 MG tablet         Review of Systems all other systems reviewed and are negative    Physical Exam   BP: (!) 142/99  Heart Rate: 97  Temp: 98.3  F (36.8  C)  Resp: 16  Height: 182.9 cm (6')  Weight: 106.6 kg (235 lb)  SpO2: 99 %  Lying Orthostatic BP: 136/91  Lying Orthostatic Pulse: 76 bpm  Sitting Orthostatic BP: 138/100  Sitting Orthostatic Pulse: 93 bpm  Standing Orthostatic BP: 136/99  Standing Orthostatic Pulse: 110 bpm      Physical Exam general alert and cooperative male in mild to moderate distress.  He has a persistent dry cough during the evaluation.  HEENT shows an injected left TM otherwise ears were normal.  Eyes show equal reactive pupils.  Extraocular motions are intact.  With right lateral gaze patient has some reproduction of his symptoms and with left lateral gaze patient states that it seems like his vision has doubled.  He has no facial asymmetry.  No difficulty speech or swallowing.  Neck is supple without meningismus, midline tenderness, and no radicular arm or leg pain/paresthesia with manipulation.  No bruits of the neck lungs are clear without wheezing.  Cardiac regular without murmur.  Abdomen is obese.  Neurologically no focal motor or sensory findings.    ED Course     ED Course     Procedures               EKG Interpretation:      Interpreted by Zak Tang  Time reviewed: 14:55  Symptoms at time of EKG: Intermittent lightheadedness with chronic cough  Rhythm: normal sinus   Rate: Normal  Axis: Normal  Ectopy: none  Conduction: right bundle branch block (complete)  ST Segments/ T Waves: No acute ischemic changes  Q Waves: none  Comparison to prior: No old EKG  available    Clinical Impression: right bundle branch block                Critical Care time:  none               Results for orders placed or performed during the hospital encounter of 04/25/18 (from the past 24 hour(s))   CBC with platelets differential   Result Value Ref Range    WBC 4.8 4.0 - 11.0 10e9/L    RBC Count 4.94 4.4 - 5.9 10e12/L    Hemoglobin 14.6 13.3 - 17.7 g/dL    Hematocrit 40.5 40.0 - 53.0 %    MCV 82 78 - 100 fl    MCH 29.6 26.5 - 33.0 pg    MCHC 36.0 31.5 - 36.5 g/dL    RDW 13.7 10.0 - 15.0 %    Platelet Count 191 150 - 450 10e9/L    Diff Method Automated Method     % Neutrophils 66.5 %    % Lymphocytes 20.8 %    % Monocytes 8.2 %    % Eosinophils 2.9 %    % Basophils 0.8 %    % Immature Granulocytes 0.8 %    Absolute Neutrophil 3.2 1.6 - 8.3 10e9/L    Absolute Lymphocytes 1.0 0.8 - 5.3 10e9/L    Absolute Monocytes 0.4 0.0 - 1.3 10e9/L    Absolute Eosinophils 0.1 0.0 - 0.7 10e9/L    Absolute Basophils 0.0 0.0 - 0.2 10e9/L    Abs Immature Granulocytes 0.0 0 - 0.4 10e9/L   INR   Result Value Ref Range    INR 1.04 0.86 - 1.14   Comprehensive metabolic panel   Result Value Ref Range    Sodium 138 133 - 144 mmol/L    Potassium 3.8 3.4 - 5.3 mmol/L    Chloride 107 94 - 109 mmol/L    Carbon Dioxide 27 20 - 32 mmol/L    Anion Gap 4 3 - 14 mmol/L    Glucose 113 (H) 70 - 99 mg/dL    Urea Nitrogen 22 7 - 30 mg/dL    Creatinine 0.82 0.66 - 1.25 mg/dL    GFR Estimate >90 >60 mL/min/1.7m2    GFR Estimate If Black >90 >60 mL/min/1.7m2    Calcium 8.4 (L) 8.5 - 10.1 mg/dL    Bilirubin Total 0.5 0.2 - 1.3 mg/dL    Albumin 3.9 3.4 - 5.0 g/dL    Protein Total 7.7 6.8 - 8.8 g/dL    Alkaline Phosphatase 60 40 - 150 U/L    ALT 37 0 - 70 U/L    AST 18 0 - 45 U/L   Troponin I   Result Value Ref Range    Troponin I ES <0.015 0.000 - 0.045 ug/L   UA reflex to Microscopic   Result Value Ref Range    Color Urine Yellow     Appearance Urine Clear     Glucose Urine Negative NEG^Negative mg/dL    Bilirubin Urine Negative  NEG^Negative    Ketones Urine 5 (A) NEG^Negative mg/dL    Specific Gravity Urine 1.021 1.003 - 1.035    Blood Urine Negative NEG^Negative    pH Urine 5.0 5.0 - 7.0 pH    Protein Albumin Urine Negative NEG^Negative mg/dL    Urobilinogen mg/dL 0.0 0.0 - 2.0 mg/dL    Nitrite Urine Negative NEG^Negative    Leukocyte Esterase Urine Negative NEG^Negative    Source Midstream Urine    Drug abuse screen urine   Result Value Ref Range    Amphetamine Qual Urine Negative NEG^Negative    Barbiturates Qual Urine Negative NEG^Negative    Benzodiazepine Qual Urine Negative NEG^Negative    Cannabinoids Qual Urine Negative NEG^Negative    Cocaine Qual Urine Negative NEG^Negative    Opiates Qualitative Urine Negative NEG^Negative    PCP Qual Urine Negative NEG^Negative   XR Chest 2 Views    Narrative    CHEST TWO VIEWS  4/25/2018 4:55 PM     HISTORY: Chronic cough.    COMPARISON: 7/19/2017      Impression    IMPRESSION: Normal.    EJ SCHWAB MD       Medications   0.9% sodium chloride BOLUS (not administered)     Followed by   sodium chloride 0.9% infusion (not administered)   gadobutrol (GADAVIST) injection 10 mL (not administered)   sodium chloride bacteriostatic 0.9 % flush 60 mL (not administered)   meclizine (ANTIVERT) tablet 25 mg (25 mg Oral Given 4/25/18 1616)     Patient thinks he was somewhat better with meclizine.  Is able ambulate without difficulty or assistance.  However when he bent forward the symptoms seem to worsen.  Urine was positive for ketones but otherwise normal.  Chest x-ray showed no acute cause for his cough.  EKG was obtained and reviewed as above.  Assessments & Plan (with Medical Decision Making)   Presents with multiple concerns with chronic issues.  Patient states he had ongoing issues with lightheadedness and dizziness for at least 6 months and maybe longer.  Symptoms are somewhat positional.  Previous treated with oral steroids by primary MD without great benefit.  No CNS imaging.  No associated  headache, visual change, speech difficulties, focal motor or sensory findings.  States he has a hard time concentrating at times.  Denies migraine headaches.  States that his vision over the last year has worsened for fine print.  He has not seen a optometrist or ophthalmologist.  He did buy some readers and that is enabled him to see the small print.  He also has had chronic left-sided neck pain for almost a year without any radicular arm pain.  No history of carotid or vertebral dissection.  No history of DVT or PE.  We did attempt to do imaging by MRI of his head and neck today but unfortunately after he was prepped for the procedure he was unable to tolerate due to claustrophobia.  Unfortunately they did not have openings where we could repeat the procedure this evening.  This can be arranged outpatient by primary MD if patient would prefer to pursue this.  Could premedicate with Valium.  His other concern is ongoing history of chronic nonproductive cough.  He has had this for greater than 2 years.  He has previously been on lisinopril which worsened the cough and is currently on Cozaar.  He is informed that Cozaar can also cause chronic cough.  He was previously diagnosed with mild persistent asthma but states he did a methacholine challenge and was negative.  He has been tried on multiple different meds including meds for reflux, nasal antihistamines, oral steroids, all without benefit.  He has never seen ENT or pulmonology.  He was recently diagnosed with diabetes.  He changed his diet and has lost 25 pounds.  He has an appointment on Friday with his primary doctor to recheck his other symptoms and his A1c.  I have asked him to discuss his chronic cough and consider changing his Cozaar.  Also recommend referral to ENT which they can arrange.  If he wants to pursue his neck pain and imaging that could be arranged through the primary care also.  He is given handout on dizziness and on chronic cough.  Reasons to  return to the emergency room were discussed.  Will start him on Flonase for nasal swelling to see if that will benefit with his cough.  Also given meclizine which should benefit with nasal drainage and for dizziness if vertiginous in nature.  I have reviewed the nursing notes.    I have reviewed the findings, diagnosis, plan and need for follow up with the patient.       New Prescriptions    FLUTICASONE (FLONASE) 50 MCG/ACT SPRAY    Spray 1-2 sprays into both nostrils daily    MECLIZINE (ANTIVERT) 12.5 MG TABLET    Take 2 tablets (25 mg) by mouth 3 times daily as needed for dizziness       Final diagnoses:   Dizziness   Chronic neck pain   Chronic cough       4/25/2018   [facility] EMERGENCY DEPARTMENT     Zak Tang MD  04/25/18 9802

## 2018-04-25 NOTE — ED NOTES
Pt not feeling well, feels like he isn't thinking right, can't concentrate.  Having balance problems.  Wondering if he needs to be tested for Lyme disease.  Moving all extremities.  Alert, oriented.  Having double vision with eyes looking towards periphery.  Denies drug use.  Pt fighting cough lately.

## 2018-04-25 NOTE — ED NOTES
Brought back from MRI, was unable have done, states as soon as was all strapped down and they moved him in to the machine he freaked out and had to come out, MD aware and discussed with him

## 2018-04-26 LAB — B BURGDOR IGG+IGM SER QL: 0.05 (ref 0–0.89)

## 2018-04-27 ENCOUNTER — OFFICE VISIT (OUTPATIENT)
Dept: FAMILY MEDICINE | Facility: CLINIC | Age: 48
End: 2018-04-27
Payer: COMMERCIAL

## 2018-04-27 VITALS
SYSTOLIC BLOOD PRESSURE: 142 MMHG | WEIGHT: 237.38 LBS | DIASTOLIC BLOOD PRESSURE: 84 MMHG | OXYGEN SATURATION: 97 % | HEART RATE: 84 BPM | BODY MASS INDEX: 32.19 KG/M2 | TEMPERATURE: 97.6 F

## 2018-04-27 DIAGNOSIS — R42 DIZZINESS: ICD-10-CM

## 2018-04-27 DIAGNOSIS — M54.2 NECK PAIN ON LEFT SIDE: Primary | ICD-10-CM

## 2018-04-27 DIAGNOSIS — E11.9 TYPE 2 DIABETES MELLITUS WITHOUT COMPLICATION, WITHOUT LONG-TERM CURRENT USE OF INSULIN (H): ICD-10-CM

## 2018-04-27 LAB
CHOLEST SERPL-MCNC: 203 MG/DL
CREAT UR-MCNC: 170 MG/DL
HBA1C MFR BLD: 5.2 % (ref 0–5.6)
HDLC SERPL-MCNC: 44 MG/DL
LDLC SERPL CALC-MCNC: 127 MG/DL
MICROALBUMIN UR-MCNC: 11 MG/L
MICROALBUMIN/CREAT UR: 6.41 MG/G CR (ref 0–17)
NONHDLC SERPL-MCNC: 159 MG/DL
TRIGL SERPL-MCNC: 158 MG/DL

## 2018-04-27 PROCEDURE — 82043 UR ALBUMIN QUANTITATIVE: CPT | Performed by: INTERNAL MEDICINE

## 2018-04-27 PROCEDURE — 36415 COLL VENOUS BLD VENIPUNCTURE: CPT | Performed by: INTERNAL MEDICINE

## 2018-04-27 PROCEDURE — 80061 LIPID PANEL: CPT | Performed by: INTERNAL MEDICINE

## 2018-04-27 PROCEDURE — 83036 HEMOGLOBIN GLYCOSYLATED A1C: CPT | Performed by: INTERNAL MEDICINE

## 2018-04-27 PROCEDURE — 99214 OFFICE O/P EST MOD 30 MIN: CPT | Performed by: INTERNAL MEDICINE

## 2018-04-27 NOTE — MR AVS SNAPSHOT
After Visit Summary   4/27/2018    Ilya Villagran    MRN: [id number]           Patient Information     Date Of Birth          1970        Visit Information        Provider Department      4/27/2018 11:00 AM Carin Pepe,  [facility]        Today's Diagnoses     Type 2 diabetes mellitus without complication, without long-term current use of insulin (H)    -  1    Neck pain on left side        Dizziness          Care Instructions    1. Mri brain and neck  [phone].  [facility]  2. Neurology - [facility] or [facility]  3. Lab for blood and urine          Follow-ups after your visit        Additional Services     NEUROLOGY ADULT REFERRAL       Your provider has referred you for the following:   Consult at [facility]: [facility], [place] - [place] [phone]   [email or web address]  [place] [facility] [place] - [place] [phone]   [email or web address]    Please be aware that coverage of these services is subject to the terms and limitations of your health insurance plan.  Call member services at your health plan with any benefit or coverage questions.      Please bring the following with you to your appointment:    (1) Any X-Rays, CTs or MRIs which have been performed.  Contact the facility where they were done to arrange for  prior to your scheduled appointment.    (2) List of current medications  (3) This referral request   (4) Any documents/labs given to you for this referral                  Future tests that were ordered for you today     Open Future Orders        Priority Expected Expires Ordered    MR Cervical Spine w/o Contrast Routine  4/27/2019 4/27/2018    MR Brain w/o Contrast Routine  4/27/2019 4/27/2018            Who to contact     If you have questions or need follow up information about today's clinic visit or your schedule please contact [facility] directly at [phone].  Normal or  "non-critical lab and imaging results will be communicated to you by MyChart, letter or phone within 4 business days after the clinic has received the results. If you do not hear from us within 7 days, please contact the clinic through [employer] or phone. If you have a critical or abnormal lab result, we will notify you by phone as soon as possible.  Submit refill requests through Diana or call your pharmacy and they will forward the refill request to us. Please allow 3 business days for your refill to be completed.          Additional Information About Your Visit        Diana Information     Diana lets you send messages to your doctor, view your test results, renew your prescriptions, schedule appointments and more. To sign up, go to [email or web address] . Click on \"Log in\" on the left side of the screen, which will take you to the Welcome page. Then click on \"Sign up Now\" on the right side of the page.     You will be asked to enter the access code listed below, as well as some personal information. Please follow the directions to create your username and password.     Your access code is: [email or web address]  Expires: 2018  5:18 PM     Your access code will  in 90 days. If you need help or a new code, please call your [facility] clinic or [phone].        Care EveryWhere ID     This is your Care EveryWhere ID. This could be used by other organizations to access your [facility] medical records  [id number]        Your Vitals Were     Pulse Temperature Pulse Oximetry BMI (Body Mass Index)          84 97.6  F (36.4  C) (Tympanic) 97% 32.19 kg/m2         Blood Pressure from Last 3 Encounters:   18 142/84   18 136/86   01/15/18 (!) 161/97    Weight from Last 3 Encounters:   18 237 lb 6 oz (107.7 kg)   18 235 lb (106.6 kg)   01/15/18 259 lb 3.2 oz (117.6 kg)              We Performed the Following     Albumin Random Urine Quantitative with Creat Ratio     Hemoglobin A1c     Lipid " panel reflex to direct LDL Fasting     NEUROLOGY ADULT REFERRAL        Primary Care Provider Office Phone # Fax #    Carin Pepe,  [phone] [address] [facility] [address]        Equal Access to Services     ADI PLATT : Hadii alice driver hadkodyo Soomaali, waaxda luqadaha, qaybta kaalmada adeegyada, william liin hayaan williamavila marte lobo tirado. So [facility] [phone].    ATENCIÓN: Si habla español, tiene a smith disposición servicios gratuitos de asistencia lingüística. Llame al [phone].    We comply with applicable federal civil rights laws and [place] laws. We do not discriminate on the basis of race, color, national origin, age, disability, sex, sexual orientation, or gender identity.            Thank you!     Thank you for choosing [facility]  for your care. Our goal is always to provide you with excellent care. Hearing back from our patients is one way we can continue to improve our services. Please take a few minutes to complete the written survey that you may receive in the mail after your visit with us. Thank you!             Your Updated Medication List - Protect others around you: Learn how to safely use, store and throw away your medicines at www.disposemymeds.org.          This list is accurate as of 4/27/18 12:23 PM.  Always use your most recent med list.                   Brand Name Dispense Instructions for use Diagnosis    albuterol 108 (90 Base) MCG/ACT Inhaler    PROAIR HFA/PROVENTIL HFA/VENTOLIN HFA    1 Inhaler    Inhale 2 puffs into the lungs every 4 hours as needed for shortness of breath / dyspnea    Mild persistent asthma without complication       azelastine 0.1 % spray    ASTELIN    30 mL    Spray 2 sprays into both nostrils 2 times daily as needed    Cough       blood glucose lancets standard    no brand specified    100 each    Use to test blood sugar 1 times daily or as directed.    Type 2 diabetes mellitus without complication, without  long-term current use of insulin (H)       blood glucose monitoring meter device kit    no brand specified    1 kit    Use to test blood sugar 1 times daily or as directed.    Type 2 diabetes mellitus without complication, without long-term current use of insulin (H)       blood glucose monitoring test strip    no brand specified    100 strip    Use to test blood sugars 1 times daily or as directed    Type 2 diabetes mellitus without complication, without long-term current use of insulin (H)       fluticasone 50 MCG/ACT spray    FLONASE    1 Bottle    Spray 1-2 sprays into both nostrils daily        fluticasone-salmeterol 250-50 MCG/DOSE diskus inhaler    ADVAIR    1 Inhaler    Inhale 1 puff into the lungs 2 times daily    Bronchospasm       losartan 50 MG tablet    COZAAR    135 tablet    Take 1.5 tablets (75 mg) by mouth daily This is a new dose as of today.    Essential hypertension       meclizine 12.5 MG tablet    ANTIVERT    30 tablet    Take 2 tablets (25 mg) by mouth 3 times daily as needed for dizziness

## 2018-04-27 NOTE — PROGRESS NOTES
SUBJECTIVE:   Ilya Villagran is a 47 year old male who presents to clinic today for the following health issues:      Diabetes Follow-up      Patient is checking blood sugars: rarely.  Results range from 110  to 140    Diabetic concerns: other - is not taking any meds for A1C     Symptoms of hypoglycemia (low blood sugar): dizzy, blurred vision     Paresthesias (numbness or burning in feet) or sores: No     Date of last diabetic eye exam: never done    Hyperlipidemia Follow-Up      Rate your low fat/cholesterol diet?: fair    Taking statin?  No    Other lipid medications/supplements?:  none    Hypertension Follow-up      Outpatient blood pressures are being checked at home and store.  Results are 140/90.    Low Salt Diet: no added salt    BP Readings from Last 2 Encounters:   04/25/18 136/86   01/15/18 (!) 161/97     Hemoglobin A1C (%)   Date Value   01/10/2018 6.7 (H)   08/22/2017 6.0       Amount of exercise or physical activity: None    Problems taking medications regularly: No    Medication side effects: none    Diet: low salt    Pt declined schedule DM follow up for next week: would like to do all the DM, BP, LDL - wants to do all today        ED/UC Followup:    [facility]  Date of visit: 4/25/2018  Reason for visit: Dizziness, Chronic Neck pain, Chronic cough  Current Status: Still having cough, dizziness, still back neck pain     Acute Illness   Acute illness concerns: Chronic cough  Onset: Follow up From E.D. - having this for years    Fever: no    Chills/Sweats: no    Headache (location?): no    Sinus Pressure:no    Conjunctivitis:  YES: both    Ear Pain: no    Rhinorrhea: no     Congestion: no    Sore Throat: no     Cough: YES    Wheeze: YES    Decreased Appetite: no    Nausea: no    Vomiting: no    Diarrhea:  no    Dysuria/Freq.: no    Fatigue/Achiness: no    Sick/Strep Exposure: no     Therapies Tried and outcome: na    Dizziness  Onset: Follow up from the E.D.    Description:   Do  you feel faint:  YES  Does it feel like the surroundings (bed, room) are moving: no   Unsteady/off balance: YES  Have you passed out or fallen: no     Intensity: severe    Progression of Symptoms:  worsening and constant    Accompanying Signs & Symptoms:  Heart palpitations: no   Nausea, vomiting: no   Weakness in arms or legs: no   Fatigue: YES  Vision or speech changes: no   Ringing in ears (Tinnitus): YES  Hearing Loss: YES     History:   Head trauma/concussion hx: no   Previous similar symptoms: YES- January  Recent bleeding history: no     Precipitating factors:   Worse with activity or head movement: YES  Any new medications (BP?): YES  Alcohol/drug abuse/withdrawal: no - on the weekend ocasionally    Alleviating factors: Does staying in a fixed position give relief:  no     Therapies Tried and outcome:     Feels 'fuzzy' headed. By dizzy he means 'hard to concentrate', and feeling lightheaded after bending over.  Feels off-balance with bending over and standing back up.    Sometimes he feels like he might pass out, but hasn't    He works with metal lathe, and the machine spins.  Watching this spin significantly worsens his symptoms.      No vertigo    Took meclizine TID which has helped some.    He has noted lower hearing for 6 months.    Neck Pain  Onset: Follow up E.D    Description:   Location: Left side of the neck.  Some tenderness with palpation.  Massage can make it worse (done by Chiro)  Radiation: none. No headache, no radicular symptoms.     Intensity: moderate, 6/10 right now    Progression of Symptoms:  same and constant    Accompanying Signs & Symptoms:  Burning, prickly sensation (paresthesias) in arm(s): no   Numbness in arm(s): no   Weakness in arm(s):  no   Fever: no   Headache: no   Nausea and/or vomiting: no     History:   Trauma: no   Previous neck pain: YES  Previous surgery or injections: no   Previous Imaging (MRI,X ray): no     Precipitating factors:    Does movement increase the pain:   YES     Alleviating factors:laying down    Therapies Tried and outcome:  chiropractor    Had to leave work due to neck pain and dizziness.  ER provider did not think the two were related.  He had severe claustrophobia so was unable to complete MRI during ER visit on 4/25.   He thinks dizziness is related to neck pain, because she can move his neck in certain positions and this causes increased pain and dizziness.  Neck pain is constant.  The steroid did not help with dizziness.  He did see chiro which helped with dizziness and neck pain x 1-2 days.       Current Outpatient Prescriptions   Medication Sig Dispense Refill     fluticasone (FLONASE) 50 MCG/ACT spray Spray 1-2 sprays into both nostrils daily 1 Bottle 11     losartan (COZAAR) 50 MG tablet Take 1.5 tablets (75 mg) by mouth daily This is a new dose as of today. 135 tablet 3     meclizine (ANTIVERT) 12.5 MG tablet Take 2 tablets (25 mg) by mouth 3 times daily as needed for dizziness 30 tablet 0     albuterol (PROAIR HFA/PROVENTIL HFA/VENTOLIN HFA) 108 (90 BASE) MCG/ACT Inhaler Inhale 2 puffs into the lungs every 4 hours as needed for shortness of breath / dyspnea (Patient not taking: Reported on 4/27/2018) 1 Inhaler 3     azelastine (ASTELIN) 0.1 % spray Spray 2 sprays into both nostrils 2 times daily as needed (Patient not taking: Reported on 4/27/2018) 30 mL 3     blood glucose (NO BRAND SPECIFIED) lancets standard Use to test blood sugar 1 times daily or as directed. (Patient not taking: Reported on 4/27/2018) 100 each 11     blood glucose monitoring (NO BRAND SPECIFIED) meter device kit Use to test blood sugar 1 times daily or as directed. (Patient not taking: Reported on 4/27/2018) 1 kit 0     blood glucose monitoring (NO BRAND SPECIFIED) test strip Use to test blood sugars 1 times daily or as directed (Patient not taking: Reported on 4/27/2018) 100 strip 3     fluticasone-salmeterol (ADVAIR) 250-50 MCG/DOSE diskus inhaler Inhale 1 puff into the lungs 2  times daily (Patient not taking: Reported on 1/10/2018) 1 Inhaler 11     methylPREDNISolone (MEDROL DOSEPAK) 4 MG tablet Follow package instructions (Patient not taking: Reported on 4/27/2018) 21 tablet 0       Reviewed and updated as needed this visit by clinical staff  Allergies  Meds       Reviewed and updated as needed this visit by Provider         ROS:  Constitutional, HEENT, cardiovascular, pulmonary, gi and gu systems are negative, except as otherwise noted.    OBJECTIVE:     /84 (BP Location: Right arm, Patient Position: Chair, Cuff Size: Adult Large)  Pulse 84  Temp 97.6  F (36.4  C) (Tympanic)  Wt 237 lb 6 oz (107.7 kg)  SpO2 97%  BMI 32.19 kg/m2  Body mass index is 32.19 kg/(m^2).  GENERAL APPEARANCE: healthy, alert, no distress and over weight  MS: no pain with palpation of cervical spinous process, bilateral paracervical muscles.  Bilateral UE strength normal.    NEURO: Normal strength and tone, mentation intact, speech normal, DTR symmetrically normal in lower/upper extremities, gait normal including heel/toe/tandem walking, cranial nerves 2-12 intact and Romberg normal  Comprehensive back pain exam:  No tenderness, Range of motion not limited by pain, Lower extremity strength functional and equal on both sides, Lower extremity reflexes within normal limits bilaterally and Lower extremity sensation normal and equal on both sides    Diagnostic Test Results:  Results for orders placed or performed in visit on 04/27/18   Lipid panel reflex to direct LDL Fasting   Result Value Ref Range    Cholesterol 203 (H) <200 mg/dL    Triglycerides 158 (H) <150 mg/dL    HDL Cholesterol 44 >39 mg/dL    LDL Cholesterol Calculated 127 (H) <100 mg/dL    Non HDL Cholesterol 159 (H) <130 mg/dL   Albumin Random Urine Quantitative with Creat Ratio   Result Value Ref Range    Creatinine Urine 170 mg/dL    Albumin Urine mg/L 11 mg/L    Albumin Urine mg/g Cr 6.41 0 - 17 mg/g Cr   Hemoglobin A1c   Result Value Ref  Range    Hemoglobin A1C 5.2 0 - 5.6 %       ASSESSMENT/PLAN:       1. Neck pain on left side - not clear if the neck pain and dizziness are caused by the same condition.  Start with MRI of head/neck, see dizzy/balance center.    - MR Cervical Spine w/o Contrast; Future  - MR Brain w/o Contrast; Future  - NEUROLOGY ADULT REFERRAL    2. Dizziness - doesn't sound like vertigo that physical therapy would be helpful for.   - MR Cervical Spine w/o Contrast; Future  - MR Brain w/o Contrast; Future  - NEUROLOGY ADULT REFERRAL    3. Type 2 diabetes mellitus without complication, without long-term current use of insulin (H) - improved with diet changes.  - Lipid panel reflex to direct LDL Fasting  - Albumin Random Urine Quantitative with Creat Ratio  - Hemoglobin A1c    Patient Instructions   1. Mri brain and neck  [phone].  JORGE Arthur  2. Neurology - Dizzy/Balance or [facility]  3. Lab for blood and urine      Carin Pepe,   [facility]

## 2018-04-27 NOTE — NURSING NOTE
Chief Complaint   Patient presents with     ER F/U     [place] [facility] - 4/25/2018 - Chronic Neck Pain, chronic cough, and Dizziness     Back Pain     neck chronic pain - Follow up from [facility].     Dizziness     Follow up from [facility].     URI     Follow up from [facility].     Diabetes     Follow up.     Hypertension     Follow up.     Hyperlipidemia     Follow up.       Initial /84 (BP Location: Right arm, Patient Position: Chair, Cuff Size: Adult Large)  Pulse 84  Temp 97.6  F (36.4  C) (Tympanic)  Wt 237 lb 6 oz (107.7 kg)  SpO2 97%  BMI 32.19 kg/m2 Estimated body mass index is 32.19 kg/(m^2) as calculated from the following:    Height as of 4/25/18: 6' (1.829 m).    Weight as of this encounter: 237 lb 6 oz (107.7 kg).  Medication Reconciliation: complete   Ciara Villalba CMA (AAMA)   (aka: Ange Villalba)

## 2018-04-27 NOTE — PROGRESS NOTES
Urine negative for protein,  a1c at goal.  Cholesterol is elevated.  We should discuss more at a future visit.

## 2018-04-27 NOTE — PATIENT INSTRUCTIONS
1. Mri brain and neck  [phone].  [facility] [place]  2. Neurology - Dizzy/Balance or [place]  3. Lab for blood and urine

## 2018-04-28 ENCOUNTER — NURSE TRIAGE (OUTPATIENT)
Dept: NURSING | Facility: CLINIC | Age: 48
End: 2018-04-28

## 2018-04-28 NOTE — TELEPHONE ENCOUNTER
Reason for Call: Called for A1c results and gave results of 5.2 .   Patient Recommendations/Teaching:FNA  encourage the no sugar and has lost 20 wt .     .Antonietta Boyd RN [place] nurse advisors.

## 2018-05-01 ENCOUNTER — TELEPHONE (OUTPATIENT)
Dept: FAMILY MEDICINE | Facility: CLINIC | Age: 48
End: 2018-05-01

## 2018-05-01 NOTE — TELEPHONE ENCOUNTER
Orders faxed to [facility] at [phone] per their request.    [facility]

## 2018-05-04 NOTE — TELEPHONE ENCOUNTER
Central scheduling calling stating the pt wanted his orders to go to [facility] for his MRI and asked that we fax them there.    Orders faxed to [phone].    [facility]

## 2018-08-23 DIAGNOSIS — I10 ESSENTIAL HYPERTENSION: ICD-10-CM

## 2018-08-23 NOTE — LETTER
Ilya Villagran  [address] [id number]      August 23, 2018          Dear Ilya,    We have received a refill request from your pharmacy for Losartan, however, we were only able to provide a one time fill because you are due for a RN Blood Pressure check.   Please call [phone] to schedule an appointment for an RN Blood Pressure check (BP Check) before you are due for your next refill.           Sincerely,      Dr. Pepe's Care Team

## 2018-08-23 NOTE — TELEPHONE ENCOUNTER
"Requested Prescriptions   Pending Prescriptions Disp Refills     losartan (COZAAR) 50 MG tablet [Pharmacy Med Name: LOSARTAN 50MG TABLETS]  Last Written Prescription Date:  08/22/17  Last Fill Quantity: 135,  # refills: 3   Last office visit: 4/27/2018 with prescribing provider:  04/27/18   Future Office Visit:     135 tablet 0     Sig: TAKE ONE AND ONE-HALF TABLETS BY MOUTH ONCE DAILY    Angiotensin-II Receptors Failed    8/23/2018  3:43 AM       Failed - Blood pressure under 140/90 in past 12 months    BP Readings from Last 3 Encounters:   04/27/18 142/84   04/25/18 136/86   01/15/18 (!) 161/97          Passed - Recent (12 mo) or future (30 days) visit within the authorizing provider's specialty    Patient had office visit in the last 12 months or has a visit in the next 30 days with authorizing provider or within the authorizing provider's specialty.  See \"Patient Info\" tab in inbasket, or \"Choose Columns\" in Meds & Orders section of the refill encounter.           Passed - Patient is age 18 or older       Passed - Normal serum creatinine on file in past 12 months    Recent Labs   Lab Test  04/25/18   1425   CR  0.82          Passed - Normal serum potassium on file in past 12 months    Recent Labs   Lab Test  04/25/18   1425   POTASSIUM  3.8                      "

## 2018-08-23 NOTE — TELEPHONE ENCOUNTER
Routing refill request to provider for review/approval because:  Labs out of range:  BP        Asking if:  Medication is being filled for 1 time refill only due to:  Patient needs to be seen because due for BP check with an RN.   Mailed letter to patient's home to f/u for RN BP check.

## 2018-08-24 RX ORDER — LOSARTAN POTASSIUM 50 MG/1
TABLET ORAL
Qty: 135 TABLET | Refills: 0 | Status: SHIPPED | OUTPATIENT
Start: 2018-08-24 | End: 2018-12-10

## 2018-09-10 ENCOUNTER — TELEPHONE (OUTPATIENT)
Dept: FAMILY MEDICINE | Facility: CLINIC | Age: 48
End: 2018-09-10

## 2018-09-10 ENCOUNTER — TELEPHONE (OUTPATIENT)
Dept: ALLERGY | Facility: CLINIC | Age: 48
End: 2018-09-10

## 2018-09-10 DIAGNOSIS — R06.2 WHEEZING: Primary | ICD-10-CM

## 2018-09-10 DIAGNOSIS — R05.3 PERSISTENT COUGH: Primary | ICD-10-CM

## 2018-09-10 NOTE — TELEPHONE ENCOUNTER
Pt was seen in 08/2017 - recommended possible pulmonology evaluation. Pt has decided he would like a referral to a pulmonologist. Pt's symptoms of cough and wheezing are not improving at all.     Pt can be contacted @:  [phone] (work #; not able to leave message)    If calling between 12-12:30, call pt's cell #:  [phone]    Vivian Behrendt  Specialty CSS

## 2018-09-10 NOTE — TELEPHONE ENCOUNTER
Reason for Call: Request for an order or referral:    Order or referral being requested: Referral - pulmonologist    Date needed: as soon as possible    Has the patient been seen by the PCP for this problem? YES    Additional comments: Pt would like a referral to pulmonology. Persistant cough, wheezing, runny irritated eyes    Phone number Patient can be reached at:  Home number on file [phone] (home)    Best Time:  any    Can we leave a detailed message on this number?      Call taken on 9/10/2018 at 9:36 AM by Jasmyne Quintero

## 2018-09-10 NOTE — TELEPHONE ENCOUNTER
Patient previously seen by allergy/asthma in 8/28/2017.   Per dictation:  Normal pulmonary function.  The results of methacholine challenge test are pending; however, from what I'm able to see, there was no decrease in FEV1 by 20% at any point of the challenge.  It would argue against asthma.  -I wonder if azelastine helped with patient's cough, since in the past NyQuil was thought to be helpful.  If he continues having symptoms, consider Pulmonology reevaluation, and evaluation at voice clinic for vocal cord dysfunction.  We can discuss that during follow-up visit, which can be done at a time this month.      Pulmonary medicine referral placed.   Provider please review and advise. Thank you.

## 2018-09-12 ENCOUNTER — OFFICE VISIT (OUTPATIENT)
Dept: FAMILY MEDICINE | Facility: CLINIC | Age: 48
End: 2018-09-12
Payer: COMMERCIAL

## 2018-09-12 ENCOUNTER — RADIANT APPOINTMENT (OUTPATIENT)
Dept: GENERAL RADIOLOGY | Facility: CLINIC | Age: 48
End: 2018-09-12
Attending: INTERNAL MEDICINE
Payer: COMMERCIAL

## 2018-09-12 ENCOUNTER — NURSE TRIAGE (OUTPATIENT)
Dept: NURSING | Facility: CLINIC | Age: 48
End: 2018-09-12

## 2018-09-12 VITALS
OXYGEN SATURATION: 98 % | DIASTOLIC BLOOD PRESSURE: 84 MMHG | SYSTOLIC BLOOD PRESSURE: 130 MMHG | TEMPERATURE: 99.5 F | WEIGHT: 241 LBS | HEART RATE: 109 BPM | BODY MASS INDEX: 32.69 KG/M2

## 2018-09-12 DIAGNOSIS — R05.3 CHRONIC COUGH: ICD-10-CM

## 2018-09-12 DIAGNOSIS — R07.0 THROAT PAIN: ICD-10-CM

## 2018-09-12 DIAGNOSIS — J20.9 ACUTE BRONCHITIS, UNSPECIFIED ORGANISM: Primary | ICD-10-CM

## 2018-09-12 LAB
BASOPHILS # BLD AUTO: 0 10E9/L (ref 0–0.2)
BASOPHILS NFR BLD AUTO: 0.3 %
DEPRECATED S PYO AG THROAT QL EIA: NORMAL
DIFFERENTIAL METHOD BLD: NORMAL
EOSINOPHIL # BLD AUTO: 0.1 10E9/L (ref 0–0.7)
EOSINOPHIL NFR BLD AUTO: 1 %
ERYTHROCYTE [DISTWIDTH] IN BLOOD BY AUTOMATED COUNT: 12.9 % (ref 10–15)
HCT VFR BLD AUTO: 43.5 % (ref 40–53)
HGB BLD-MCNC: 15 G/DL (ref 13.3–17.7)
IMM GRANULOCYTES # BLD: 0 10E9/L (ref 0–0.4)
IMM GRANULOCYTES NFR BLD: 0.7 %
LYMPHOCYTES # BLD AUTO: 1.8 10E9/L (ref 0.8–5.3)
LYMPHOCYTES NFR BLD AUTO: 29.4 %
MCH RBC QN AUTO: 29.3 PG (ref 26.5–33)
MCHC RBC AUTO-ENTMCNC: 34.5 G/DL (ref 31.5–36.5)
MCV RBC AUTO: 85 FL (ref 78–100)
MONOCYTES # BLD AUTO: 0.6 10E9/L (ref 0–1.3)
MONOCYTES NFR BLD AUTO: 10.4 %
NEUTROPHILS # BLD AUTO: 3.5 10E9/L (ref 1.6–8.3)
NEUTROPHILS NFR BLD AUTO: 58.2 %
NRBC # BLD AUTO: 0 10*3/UL
NRBC BLD AUTO-RTO: 0 /100
PLATELET # BLD AUTO: 193 10E9/L (ref 150–450)
PROCALCITONIN SERPL-MCNC: <0.05 NG/ML
RBC # BLD AUTO: 5.12 10E12/L (ref 4.4–5.9)
SPECIMEN SOURCE: NORMAL
WBC # BLD AUTO: 6 10E9/L (ref 4–11)

## 2018-09-12 PROCEDURE — 99214 OFFICE O/P EST MOD 30 MIN: CPT | Performed by: INTERNAL MEDICINE

## 2018-09-12 PROCEDURE — 87880 STREP A ASSAY W/OPTIC: CPT | Performed by: INTERNAL MEDICINE

## 2018-09-12 PROCEDURE — 84145 PROCALCITONIN (PCT): CPT | Performed by: INTERNAL MEDICINE

## 2018-09-12 PROCEDURE — 87081 CULTURE SCREEN ONLY: CPT | Performed by: INTERNAL MEDICINE

## 2018-09-12 PROCEDURE — 85025 COMPLETE CBC W/AUTO DIFF WBC: CPT | Performed by: INTERNAL MEDICINE

## 2018-09-12 PROCEDURE — 71046 X-RAY EXAM CHEST 2 VIEWS: CPT | Mod: FY

## 2018-09-12 PROCEDURE — 36415 COLL VENOUS BLD VENIPUNCTURE: CPT | Performed by: INTERNAL MEDICINE

## 2018-09-12 RX ORDER — DOXYCYCLINE 100 MG/1
100 CAPSULE ORAL 2 TIMES DAILY
Qty: 14 CAPSULE | Refills: 0 | Status: SHIPPED | OUTPATIENT
Start: 2018-09-12 | End: 2019-10-22

## 2018-09-12 NOTE — PROGRESS NOTES
SUBJECTIVE:   Ilya Villagran is a 47 year old male who presents to clinic today for the following health issues:      Acute Illness   Acute illness concerns: Fever, chills and night sweats and cough  Onset: Cough for months,    Fever: YES- 6 days 103 F    Chills/Sweats: YES    Headache (location?): YES    Sinus Pressure:no    Conjunctivitis:  YES: both    Ear Pain: no    Rhinorrhea: no     Congestion: no    Sore Throat: YES     Cough: YES    Wheeze: YES    Decreased Appetite: no     Nausea: no    Vomiting: no    Diarrhea:  no    Dysuria/Freq.: no    Fatigue/Achiness: YES    Sick/Strep Exposure: no     Therapies Tried and outcome: na    Has chronic cough that he has bee doctoring for.  Has seen allergy - no asthma.  Acute on chronic dry cough.  Cough is severe - significant rib pain.  Acute on chronic shortness of breath.  Deep breathing triggers cough.  nyquil helps his chronic cough but makes him very groggy the next day.    Chronic neck pain - has claustrophobia and could not complete MRI.      Current Outpatient Prescriptions   Medication Sig Dispense Refill     albuterol (PROAIR HFA/PROVENTIL HFA/VENTOLIN HFA) 108 (90 BASE) MCG/ACT Inhaler Inhale 2 puffs into the lungs every 4 hours as needed for shortness of breath / dyspnea 1 Inhaler 3     blood glucose (NO BRAND SPECIFIED) lancets standard Use to test blood sugar 1 times daily or as directed. 100 each 11     blood glucose monitoring (NO BRAND SPECIFIED) meter device kit Use to test blood sugar 1 times daily or as directed. 1 kit 0     blood glucose monitoring (NO BRAND SPECIFIED) test strip Use to test blood sugars 1 times daily or as directed 100 strip 3     fluticasone (FLONASE) 50 MCG/ACT spray Spray 1-2 sprays into both nostrils daily 1 Bottle 11     losartan (COZAAR) 50 MG tablet TAKE ONE AND ONE-HALF TABLETS BY MOUTH ONCE DAILY 135 tablet 0     azelastine (ASTELIN) 0.1 % spray Spray 2 sprays into both nostrils 2 times daily as needed (Patient not  taking: Reported on 4/27/2018) 30 mL 3     fluticasone-salmeterol (ADVAIR) 250-50 MCG/DOSE diskus inhaler Inhale 1 puff into the lungs 2 times daily (Patient not taking: Reported on 9/12/2018) 1 Inhaler 11     meclizine (ANTIVERT) 12.5 MG tablet Take 2 tablets (25 mg) by mouth 3 times daily as needed for dizziness (Patient not taking: Reported on 9/12/2018) 30 tablet 0       Reviewed and updated as needed this visit by clinical staff  Tobacco  Allergies  Meds  Problems  Med Hx  Surg Hx  Fam Hx  Soc Hx        Reviewed and updated as needed this visit by Provider  Allergies  Meds  Problems         ROS:  Constitutional, HEENT, cardiovascular, pulmonary, gi and gu systems are negative, except as otherwise noted.    OBJECTIVE:     /90  Pulse 109  Temp 99.5  F (37.5  C) (Tympanic)  Wt 241 lb (109.3 kg)  SpO2 98%  BMI 32.69 kg/m2  Body mass index is 32.69 kg/(m^2).  GENERAL APPEARANCE: alert, no distress, over weight and mildly ill appearing  EYES: Eyes grossly normal to inspection, PERRL and conjunctivae and sclerae normal  HENT: ear canals and TM's normal and nose and mouth without ulcers or lesions  NECK: no adenopathy, no asymmetry, masses, or scars and thyroid normal to palpation  RESP: wheezing in right middle and upper lobe, otherwise clear  CV: regular rates and rhythm, normal S1 S2, no S3 or S4 and no murmur, click or rub    Diagnostic Test Results:  Results for orders placed or performed in visit on 09/12/18 (from the past 24 hour(s))   Rapid strep screen   Result Value Ref Range    Specimen Description Throat     Rapid Strep A Screen       NEGATIVE: No Group A streptococcal antigen detected by immunoassay, await culture report.     CXR: clear by my read  ASSESSMENT/PLAN:     1. Acute bronchitis, unspecified organism - viral, vs bacterial. considered influenza.  It is early in the season for this and we are not able to test for influenza yet.  Given the high fevers and duration of illness,  will start antibiotics.  - Procalcitonin  - CBC with platelets differential  - doxycycline (VIBRAMYCIN) 100 MG capsule; Take 1 capsule (100 mg) by mouth 2 times daily  Dispense: 14 capsule; Refill: 0  - XR Chest 2 Views    2. Throat pain  - Rapid strep screen  - Beta strep group A culture    3. Chronic cough  - PULMONARY MEDICINE REFERRAL  - CT Chest w/o Contrast; Future    Patient Instructions   1. [facility] - [phone]   2. Blood work and chest xray today  3. Take antibiotic until gone  4. Be seen if worse.  5. Once this illness has resolved, get CT scan - prior to pulmonary appointment         Carin Pepe, DO  [facility]

## 2018-09-12 NOTE — MR AVS SNAPSHOT
After Visit Summary   9/12/2018    Ilya Villagran    MRN: [id number]           Patient Information     Date Of Birth          1970        Visit Information        Provider Department      9/12/2018 11:20 AM Carin Pepe,  [facility]        Today's Diagnoses     Throat pain    -  1    Chronic cough        Acute bronchitis, unspecified organism          Care Instructions    1. [facility] - [phone]   2. Blood work and chest xray today  3. Take antibiotic until gone  4. Be seen if worse.  5. Once this illness has resolved, get CT scan - prior to pulmonary appointment             Follow-ups after your visit        Additional Services     PULMONARY MEDICINE REFERRAL       Your provider has referred you to: [facility]: [facility] [place] [phone]   [email or web address]  [place] [phone]   [email or web address]    [facility]    Please be aware that coverage of these services is subject to the terms and limitations of your health insurance plan.  Call member services at your health plan with any benefit or coverage questions.      Please bring the following with you to your appointment:    (1) Any X-Rays, CTs or MRIs which have been performed.  Contact the facility where they were done to arrange for  prior to your scheduled appointment.    (2) List of current medications   (3) This referral request   (4) Any documents/labs given to you for this referral                  Future tests that were ordered for you today     Open Future Orders        Priority Expected Expires Ordered    CT Chest w/o Contrast Routine 10/12/2018 9/12/2019 9/12/2018            Who to contact     If you have questions or need follow up information about today's clinic visit or your schedule please contact [facility] directly at [phone].  Normal or non-critical lab and imaging results will be communicated to you by MyChart, letter or phone within 4  business days after the clinic has received the results. If you do not hear from us within 7 days, please contact the clinic through [employer] or phone. If you have a critical or abnormal lab result, we will notify you by phone as soon as possible.  Submit refill requests through [employer] or call your pharmacy and they will forward the refill request to us. Please allow 3 business days for your refill to be completed.          Additional Information About Your Visit        Care EveryWhere ID     This is your Care EveryWhere ID. This could be used by other organizations to access your [facility] medical records  [id number]        Your Vitals Were     Pulse Temperature Pulse Oximetry BMI (Body Mass Index)          109 99.5  F (37.5  C) (Tympanic) 98% 32.69 kg/m2         Blood Pressure from Last 3 Encounters:   09/12/18 140/90   04/27/18 142/84   04/25/18 136/86    Weight from Last 3 Encounters:   09/12/18 241 lb (109.3 kg)   04/27/18 237 lb 6 oz (107.7 kg)   04/25/18 235 lb (106.6 kg)              We Performed the Following     Beta strep group A culture     CBC with platelets differential     Procalcitonin     PULMONARY MEDICINE REFERRAL     Rapid strep screen     XR Chest 2 Views          Today's Medication Changes          These changes are accurate as of 9/12/18 11:51 AM.  If you have any questions, ask your nurse or doctor.               Start taking these medicines.        Dose/Directions    doxycycline 100 MG capsule   Commonly known as:  VIBRAMYCIN   Used for:  Acute bronchitis, unspecified organism   Started by:  Carin Pepe DO        Dose:  100 mg   Take 1 capsule (100 mg) by mouth 2 times daily   Quantity:  14 capsule   Refills:  0            Where to get your medicines      These medications were sent to [facility] [place], [place] - [address]  [address] [address]     Phone:  [phone]     doxycycline 100 MG capsule                Primary Care Provider Office  Phone # Fax #    Carin Pepe [phone] [address] [address]        Equal Access to Services     ADI PLATT : Hadii aad ku hadkodyo Soaidaali, waaxda luqadaha, qaybta kaalmada bibida, william gudinosin thomasavila ortizsharron tirado. So [facility] [phone].    ATENCIÓN: Si habla español, tiene a smith disposición servicios gratuitos de asistencia lingüística. Llame al [phone].    We comply with applicable federal civil rights laws and [place] laws. We do not discriminate on the basis of race, color, national origin, age, disability, sex, sexual orientation, or gender identity.            Thank you!     Thank you for choosing [facility]  for your care. Our goal is always to provide you with excellent care. Hearing back from our patients is one way we can continue to improve our services. Please take a few minutes to complete the written survey that you may receive in the mail after your visit with us. Thank you!             Your Updated Medication List - Protect others around you: Learn how to safely use, store and throw away your medicines at www.disposemymeds.org.          This list is accurate as of 9/12/18 11:51 AM.  Always use your most recent med list.                   Brand Name Dispense Instructions for use Diagnosis    albuterol 108 (90 Base) MCG/ACT inhaler    PROAIR HFA/PROVENTIL HFA/VENTOLIN HFA    1 Inhaler    Inhale 2 puffs into the lungs every 4 hours as needed for shortness of breath / dyspnea    Mild persistent asthma without complication       azelastine 0.1 % nasal spray    ASTELIN    30 mL    Spray 2 sprays into both nostrils 2 times daily as needed    Cough       blood glucose lancets standard    no brand specified    100 each    Use to test blood sugar 1 times daily or as directed.    Type 2 diabetes mellitus without complication, without long-term current use of insulin (H)       blood glucose monitoring meter device kit    no brand  specified    1 kit    Use to test blood sugar 1 times daily or as directed.    Type 2 diabetes mellitus without complication, without long-term current use of insulin (H)       blood glucose monitoring test strip    no brand specified    100 strip    Use to test blood sugars 1 times daily or as directed    Type 2 diabetes mellitus without complication, without long-term current use of insulin (H)       doxycycline 100 MG capsule    VIBRAMYCIN    14 capsule    Take 1 capsule (100 mg) by mouth 2 times daily    Acute bronchitis, unspecified organism       fluticasone 50 MCG/ACT spray    FLONASE    1 Bottle    Spray 1-2 sprays into both nostrils daily        fluticasone-salmeterol 250-50 MCG/DOSE diskus inhaler    ADVAIR    1 Inhaler    Inhale 1 puff into the lungs 2 times daily    Bronchospasm       losartan 50 MG tablet    COZAAR    135 tablet    TAKE ONE AND ONE-HALF TABLETS BY MOUTH ONCE DAILY    Essential hypertension       meclizine 12.5 MG tablet    ANTIVERT    30 tablet    Take 2 tablets (25 mg) by mouth 3 times daily as needed for dizziness

## 2018-09-12 NOTE — LETTER
September 18, 2018      Ilya Villagran  [address] [address]        Dear ,    We are writing to inform you of your test results.    Pro calcitonin is negative.  This could be influenza, especially if he is not improving with antibiotics    Resulted Orders   Rapid strep screen   Result Value Ref Range    Specimen Description Throat     Rapid Strep A Screen       NEGATIVE: No Group A streptococcal antigen detected by immunoassay, await culture report.   Beta strep group A culture   Result Value Ref Range    Specimen Description Throat     Culture Micro No beta hemolytic Streptococcus Group A isolated    Procalcitonin   Result Value Ref Range    Procalcitonin <0.05 ng/ml      Comment:      <0.05 ng/ml  Normal  Recommendation: Very low risk of bacterial infection.   Discourage antibiotics unless strong clinical suspicion for serious infection.     CBC with platelets differential   Result Value Ref Range    WBC 6.0 4.0 - 11.0 10e9/L    RBC Count 5.12 4.4 - 5.9 10e12/L    Hemoglobin 15.0 13.3 - 17.7 g/dL    Hematocrit 43.5 40.0 - 53.0 %    MCV 85 78 - 100 fl    MCH 29.3 26.5 - 33.0 pg    MCHC 34.5 31.5 - 36.5 g/dL    RDW 12.9 10.0 - 15.0 %    Platelet Count 193 150 - 450 10e9/L    Diff Method Automated Method     % Neutrophils 58.2 %    % Lymphocytes 29.4 %    % Monocytes 10.4 %    % Eosinophils 1.0 %    % Basophils 0.3 %    % Immature Granulocytes 0.7 %    Nucleated RBCs 0 0 /100    Absolute Neutrophil 3.5 1.6 - 8.3 10e9/L    Absolute Lymphocytes 1.8 0.8 - 5.3 10e9/L    Absolute Monocytes 0.6 0.0 - 1.3 10e9/L    Absolute Eosinophils 0.1 0.0 - 0.7 10e9/L    Absolute Basophils 0.0 0.0 - 0.2 10e9/L    Abs Immature Granulocytes 0.0 0 - 0.4 10e9/L    Absolute Nucleated RBC 0.0        If you have any questions or concerns, please call the clinic at the number listed above.       Sincerely,        Carin Pepe, DO

## 2018-09-12 NOTE — LETTER
September 13, 2018      Ilya Villagran  [address] [address] [address]              The results of your recent throat culture were negative.  If you have any further questions or concerns please contact the clinic            Sincerely,        Carin Pepe DO/ls

## 2018-09-12 NOTE — PATIENT INSTRUCTIONS
[phone]   2. Blood work and chest xray today  3. Take antibiotic until gone  4. Be seen if worse.  5. Once this illness has resolved, get CT scan - prior to pulmonary appointment

## 2018-09-13 LAB
BACTERIA SPEC CULT: NORMAL
SPECIMEN SOURCE: NORMAL

## 2018-09-13 NOTE — TELEPHONE ENCOUNTER
Reason for Disposition    Caller has NON-URGENT question and triager unable to answer question    Additional Information    Negative: Severe difficulty breathing (e.g., struggling for each breath, speaks in single words)    Negative: Sounds like a life-threatening emergency to the triager    Negative: [1] Recent hospitalization for pneumonia AND [2] taking an antibiotic    Negative: [1] Animal bite infection AND [2] taking an antibiotic    Negative: [1] Ear  infection (Otitis Media) AND [2] taking an antibiotic    Negative: [1] Ear  infection (Swimmer's Ear)) AND [2] taking an antibiotic    Negative: [1] Sinus infection AND [2] taking an antibiotic    Negative: [1] Strep throat AND [2] taking an antibiotic    Negative: [1] Urinary tract  infection (e.g., cystitis, pyelonephritis, urethritis, epididymitis) AND [2] male AND [3] taking an antibiotic    Negative: [1] Urinary tract  infection (e.g., cystitis, pyelonephritis, urethritis) AND [2] female AND [3] taking an antibiotic    Negative: [1] Wound infection AND [2] taking an antibiotic    Negative: MODERATE difficulty breathing (e.g., speaks in phrases, SOB even at rest, pulse 100-120)    Negative: Fever > 103 F (39.4 C)    Negative: Patient sounds very sick or weak to the triager    Negative: [1] Taking antibiotics > 24 hours AND [2] symptoms WORSE    Negative: [1] Recent hospitalization AND [2] symptoms WORSE    Negative: [1] Diabetes mellitus or weak immune system (e.g., HIV positive, cancer chemo, splenectomy, organ transplant, chronic steroids) AND [2] new onset of fever > 100.5 F (38.1 C)    Negative: Caller has URGENT question and triager unable to answer question    Negative: [1] Taking antibiotic AND [2] new onset of fever    Negative: [1] Taking antibiotic > 48 hours (2 days) AND [2] fever still present (SAME)    Negative: [1] Taking antibiotic > 72 hours (3 days) AND [2] symptoms (other than fever) not improved    Protocols used: INFECTION ON ANTIBIOTIC  FOLLOW-UP CALL-ADULT-ANGEL LUIS Smith (marcin) calls and says that Ilya saw the DrGabriel Today and was diagnosed with bronchitis. Pt. Was started on an antibiotic. Sarah says that pt. Has been really sweating but has had low temperatures, of: 97.3-tympanic, 96.6-tympanic, and 97.6-tympanic.

## 2018-09-28 ENCOUNTER — OFFICE VISIT (OUTPATIENT)
Dept: FAMILY MEDICINE | Facility: CLINIC | Age: 48
End: 2018-09-28
Payer: COMMERCIAL

## 2018-09-28 VITALS
BODY MASS INDEX: 33.09 KG/M2 | OXYGEN SATURATION: 98 % | RESPIRATION RATE: 12 BRPM | WEIGHT: 244 LBS | SYSTOLIC BLOOD PRESSURE: 130 MMHG | HEART RATE: 91 BPM | TEMPERATURE: 97.5 F | DIASTOLIC BLOOD PRESSURE: 80 MMHG

## 2018-09-28 DIAGNOSIS — Z11.4 SCREENING FOR HIV (HUMAN IMMUNODEFICIENCY VIRUS): ICD-10-CM

## 2018-09-28 DIAGNOSIS — E11.9 TYPE 2 DIABETES MELLITUS WITHOUT COMPLICATION, WITHOUT LONG-TERM CURRENT USE OF INSULIN (H): ICD-10-CM

## 2018-09-28 DIAGNOSIS — L20.82 FLEXURAL ECZEMA: ICD-10-CM

## 2018-09-28 DIAGNOSIS — R05.3 CHRONIC COUGH: Primary | ICD-10-CM

## 2018-09-28 LAB
ALBUMIN SERPL-MCNC: 4 G/DL (ref 3.4–5)
ALP SERPL-CCNC: 63 U/L (ref 40–150)
ALT SERPL W P-5'-P-CCNC: 37 U/L (ref 0–70)
ANION GAP SERPL CALCULATED.3IONS-SCNC: 8 MMOL/L (ref 3–14)
AST SERPL W P-5'-P-CCNC: 23 U/L (ref 0–45)
BILIRUB SERPL-MCNC: 0.6 MG/DL (ref 0.2–1.3)
BUN SERPL-MCNC: 20 MG/DL (ref 7–30)
CALCIUM SERPL-MCNC: 8.3 MG/DL (ref 8.5–10.1)
CHLORIDE SERPL-SCNC: 106 MMOL/L (ref 94–109)
CO2 SERPL-SCNC: 26 MMOL/L (ref 20–32)
CREAT SERPL-MCNC: 0.98 MG/DL (ref 0.66–1.25)
CRP SERPL-MCNC: <2.9 MG/L (ref 0–8)
ERYTHROCYTE [SEDIMENTATION RATE] IN BLOOD BY WESTERGREN METHOD: 6 MM/H (ref 0–15)
GFR SERPL CREATININE-BSD FRML MDRD: 81 ML/MIN/1.7M2
GLUCOSE SERPL-MCNC: 105 MG/DL (ref 70–99)
HBA1C MFR BLD: 5.5 % (ref 0–5.6)
POTASSIUM SERPL-SCNC: 3.9 MMOL/L (ref 3.4–5.3)
PROT SERPL-MCNC: 7.9 G/DL (ref 6.8–8.8)
SODIUM SERPL-SCNC: 140 MMOL/L (ref 133–144)

## 2018-09-28 PROCEDURE — 87389 HIV-1 AG W/HIV-1&-2 AB AG IA: CPT | Performed by: INTERNAL MEDICINE

## 2018-09-28 PROCEDURE — 83036 HEMOGLOBIN GLYCOSYLATED A1C: CPT | Performed by: INTERNAL MEDICINE

## 2018-09-28 PROCEDURE — 85613 RUSSELL VIPER VENOM DILUTED: CPT | Performed by: INTERNAL MEDICINE

## 2018-09-28 PROCEDURE — 85652 RBC SED RATE AUTOMATED: CPT | Performed by: INTERNAL MEDICINE

## 2018-09-28 PROCEDURE — 99214 OFFICE O/P EST MOD 30 MIN: CPT | Performed by: INTERNAL MEDICINE

## 2018-09-28 PROCEDURE — 00000401 ZZHCL STATISTIC THROMBIN TIME NC: Performed by: INTERNAL MEDICINE

## 2018-09-28 PROCEDURE — 86038 ANTINUCLEAR ANTIBODIES: CPT | Performed by: INTERNAL MEDICINE

## 2018-09-28 PROCEDURE — 85730 THROMBOPLASTIN TIME PARTIAL: CPT | Performed by: INTERNAL MEDICINE

## 2018-09-28 PROCEDURE — 80053 COMPREHEN METABOLIC PANEL: CPT | Performed by: INTERNAL MEDICINE

## 2018-09-28 PROCEDURE — 86140 C-REACTIVE PROTEIN: CPT | Performed by: INTERNAL MEDICINE

## 2018-09-28 PROCEDURE — 86225 DNA ANTIBODY NATIVE: CPT | Performed by: INTERNAL MEDICINE

## 2018-09-28 PROCEDURE — 00000167 ZZHCL STATISTIC INR NC: Performed by: INTERNAL MEDICINE

## 2018-09-28 PROCEDURE — 36415 COLL VENOUS BLD VENIPUNCTURE: CPT | Performed by: INTERNAL MEDICINE

## 2018-09-28 PROCEDURE — 86235 NUCLEAR ANTIGEN ANTIBODY: CPT | Performed by: INTERNAL MEDICINE

## 2018-09-28 RX ORDER — TRIAMCINOLONE ACETONIDE 1 MG/G
CREAM TOPICAL
Qty: 15 G | Refills: 0 | Status: SHIPPED | OUTPATIENT
Start: 2018-09-28 | End: 2019-01-10

## 2018-09-28 NOTE — PROGRESS NOTES
SUBJECTIVE:   Ilya Villagran is a 48 year old male who presents to clinic today for the following health issues:  Chief Complaint   Patient presents with     Blood Draw     Wants to be tested for Lupus     Lupus test:    His wife thinks he has lupus.    He has multiple rashes, myalgias/arthralgias (neck, bilateral hips,).  He has fatigue, fever.  No raynauds, hives, photosensitivity  Mother had eczema, thinks he might have this.  No pleurisy except last bout of 'bronchitis' 2 weeks ago.  No mucusoal sores    Skin - dry patches on eyelids, behind ears, on back.  Using over the counter HC cream which is not helping.  Oral steroid did help.    I saw him 2 weeks ago and gave doxy for bronchitis vs flu.  He had a fever then, no longer.  He felt much better, but then symptoms returned.  Had cough on lisinopril.  He has seen Pulm in 2014 for cough    Eye:  He saw eye doctor and gave steroid drops which helped briefly.    He had methacholine challenge test that did not show asthma. Allergy thought it was VCD or see Pulm. o nly Nyquil helps the cough but makes him so groggy      Current Outpatient Prescriptions   Medication Sig Dispense Refill     blood glucose (NO BRAND SPECIFIED) lancets standard Use to test blood sugar 1 times daily or as directed. 100 each 11     blood glucose monitoring (NO BRAND SPECIFIED) meter device kit Use to test blood sugar 1 times daily or as directed. 1 kit 0     blood glucose monitoring (NO BRAND SPECIFIED) test strip Use to test blood sugars 1 times daily or as directed 100 strip 3     fluticasone-salmeterol (ADVAIR) 250-50 MCG/DOSE diskus inhaler Inhale 1 puff into the lungs 2 times daily 1 Inhaler 11     losartan (COZAAR) 50 MG tablet TAKE ONE AND ONE-HALF TABLETS BY MOUTH ONCE DAILY 135 tablet 0     albuterol (PROAIR HFA/PROVENTIL HFA/VENTOLIN HFA) 108 (90 BASE) MCG/ACT Inhaler Inhale 2 puffs into the lungs every 4 hours as needed for shortness of breath / dyspnea (Patient not taking:  Reported on 9/28/2018) 1 Inhaler 3     azelastine (ASTELIN) 0.1 % spray Spray 2 sprays into both nostrils 2 times daily as needed (Patient not taking: Reported on 9/28/2018) 30 mL 3     doxycycline (VIBRAMYCIN) 100 MG capsule Take 1 capsule (100 mg) by mouth 2 times daily (Patient not taking: Reported on 9/28/2018) 14 capsule 0     fluticasone (FLONASE) 50 MCG/ACT spray Spray 1-2 sprays into both nostrils daily (Patient not taking: Reported on 9/28/2018) 1 Bottle 11     meclizine (ANTIVERT) 12.5 MG tablet Take 2 tablets (25 mg) by mouth 3 times daily as needed for dizziness (Patient not taking: Reported on 9/28/2018) 30 tablet 0       Reviewed and updated as needed this visit by clinical staff       Reviewed and updated as needed this visit by Provider         ROS:  Constitutional, HEENT, cardiovascular, pulmonary, gi and gu systems are negative, except as otherwise noted.    OBJECTIVE:     /80  Pulse 91  Temp 97.5  F (36.4  C) (Tympanic)  Resp 12  Wt 244 lb (110.7 kg)  SpO2 98%  BMI 33.09 kg/m2  Body mass index is 33.09 kg/(m^2).  GENERAL APPEARANCE: healthy, alert, no distress and over weight  HENT: ear canals and TM's normal and nose and mouth without ulcers or lesions  NECK: no adenopathy, no asymmetry, masses, or scars and thyroid normal to palpation  RESP: Wheezing in all lung fields.  Frequent dry hacking cough  CV: regular rates and rhythm, normal S1 S2, no S3 or S4 and no murmur, click or rub  ABDOMEN: soft, nontender, without hepatosplenomegaly or masses and bowel sounds normal  MS: extremities normal- no gross deformities noted and no erythema, swelling, swelling of any joints  SKIN: Dry scaly patches on bilateral inner eyelids, left maxillary area, behind right ear, behind left knee  PSYCH: mentation appears normal and affect normal/bright      ASSESSMENT/PLAN:     1. Chronic cough -patient requests testing for lupus.  His symptoms are not entirely consistent with lupus.  His chronic cough  and wheezing has been worked up, although he has sporadic follow-up.  He has been evaluated by pulmonary 2014, allergy last 2017.  Methacholine challenge test was negative.  He is tried and failed PPI therapy.  Next step would be CT chest and seeing pulmonology.  If this evaluation is negative, would then recommend ENT evaluation for vocal cord dysfunction.   - Anti Nuclear Kaylynn IgG by IFA with Reflex  - CRP, inflammation  - ESR: Erythrocyte sedimentation rate  - Comprehensive metabolic panel  - DNA double stranded antibodies  - Dowell DARIUS Antibody IgG  - Lupus Anticoagulant Panel    2. Type 2 diabetes mellitus without complication, without long-term current use of insulin (H) -  Due for check  - HEMOGLOBIN A1C    3. Flexural eczema  - triamcinolone (KENALOG) 0.1 % cream; Apply sparingly to affected area three times daily for 14 days.  Dispense: 15 g; Refill: 0    4. Screening for HIV (human immunodeficiency virus)  - HIV Screening    Patient Instructions   1. Labs today  2. Get CT.  3. See Lung doctor.  4. Eczema is likely the cause of the rash on the legs, eyelids, face.  5. Try the hydrocortisone cream very sparingly on the rash on legs, eyelid and face - this is eczema      Carin Pepe, DO  [facility]

## 2018-09-28 NOTE — PATIENT INSTRUCTIONS
1. Labs today  2. Get CT.  3. See Lung doctor.  4. Eczema is likely the cause of the rash on the legs, eyelids, face.  5. Try the hydrocortisone cream very sparingly on the rash on legs, eyelid and face - this is eczema

## 2018-09-28 NOTE — MR AVS SNAPSHOT
After Visit Summary   9/28/2018    Ilya Villagran    MRN: [id number]           Patient Information     Date Of Birth          1970        Visit Information        Provider Department      9/28/2018 3:40 PM Carin Pepe, DO [facility]        Today's Diagnoses     Cough    -  1    Screening for HIV (human immunodeficiency virus)        Type 2 diabetes mellitus without complication, without long-term current use of insulin (H)          Care Instructions    1. Labs today  2. Get CT.  3. See Lung doctor.  4. Eczema is likely the cause of the rash on the legs, eyelids, face.  5. Try the hydrocortisone cream very sparingly on the rash on legs, eyelid and face - this is eczema          Follow-ups after your visit        Who to contact     If you have questions or need follow up information about today's clinic visit or your schedule please contact [facility] directly at [phone].  Normal or non-critical lab and imaging results will be communicated to you by MyChart, letter or phone within 4 business days after the clinic has received the results. If you do not hear from us within 7 days, please contact the clinic through MyChart or phone. If you have a critical or abnormal lab result, we will notify you by phone as soon as possible.  Submit refill requests through [employer] or call your pharmacy and they will forward the refill request to us. Please allow 3 business days for your refill to be completed.          Additional Information About Your Visit        Care EveryWhere ID     This is your Care EveryWhere ID. This could be used by other organizations to access your [facility] medical records  [id number]        Your Vitals Were     Pulse Temperature Respirations Pulse Oximetry BMI (Body Mass Index)       91 97.5  F (36.4  C) (Tympanic) 12 98% 33.09 kg/m2        Blood Pressure from Last 3 Encounters:   09/28/18 130/80   09/12/18 130/84   04/27/18 142/84    Weight  from Last 3 Encounters:   09/28/18 244 lb (110.7 kg)   09/12/18 241 lb (109.3 kg)   04/27/18 237 lb 6 oz (107.7 kg)              We Performed the Following     Anti Nuclear Kaylynn IgG by IFA with Reflex     Comprehensive metabolic panel     CRP, inflammation     DNA double stranded antibodies     ESR: Erythrocyte sedimentation rate     HEMOGLOBIN A1C     HIV Screening     Lupus Anticoagulant Panel     Dowell DARIUS Antibody IgG        Primary Care Provider Office Phone # Fax #    Carin Pepe,  [phone] [address] [address]        Equal Access to Services     ADI PLATT : Hadii aad ku hadasho Soomaali, waaxda luqadaha, qaybta kaalmada adeegyada, william diamond . So [facility] [phone].    ATENCIÓN: Si habla español, tiene a smith disposición servicios gratuitos de asistencia lingüística. Ll[facility] [phone].    We comply with applicable federal civil rights laws and [place] laws. We do not discriminate on the basis of race, color, national origin, age, disability, sex, sexual orientation, or gender identity.            Thank you!     Thank you for choosing [facility]  for your care. Our goal is always to provide you with excellent care. Hearing back from our patients is one way we can continue to improve our services. Please take a few minutes to complete the written survey that you may receive in the mail after your visit with us. Thank you!             Your Updated Medication List - Protect others around you: Learn how to safely use, store and throw away your medicines at www.disposemymeds.org.          This list is accurate as of 9/28/18  4:26 PM.  Always use your most recent med list.                   Brand Name Dispense Instructions for use Diagnosis    albuterol 108 (90 Base) MCG/ACT inhaler    PROAIR HFA/PROVENTIL HFA/VENTOLIN HFA    1 Inhaler    Inhale 2 puffs into the lungs every 4 hours as needed for shortness of breath /  dyspnea    Mild persistent asthma without complication       azelastine 0.1 % nasal spray    ASTELIN    30 mL    Spray 2 sprays into both nostrils 2 times daily as needed    Cough       blood glucose lancets standard    no brand specified    100 each    Use to test blood sugar 1 times daily or as directed.    Type 2 diabetes mellitus without complication, without long-term current use of insulin (H)       blood glucose monitoring meter device kit    no brand specified    1 kit    Use to test blood sugar 1 times daily or as directed.    Type 2 diabetes mellitus without complication, without long-term current use of insulin (H)       blood glucose monitoring test strip    no brand specified    100 strip    Use to test blood sugars 1 times daily or as directed    Type 2 diabetes mellitus without complication, without long-term current use of insulin (H)       doxycycline 100 MG capsule    VIBRAMYCIN    14 capsule    Take 1 capsule (100 mg) by mouth 2 times daily    Acute bronchitis, unspecified organism       fluticasone 50 MCG/ACT spray    FLONASE    1 Bottle    Spray 1-2 sprays into both nostrils daily        fluticasone-salmeterol 250-50 MCG/DOSE diskus inhaler    ADVAIR    1 Inhaler    Inhale 1 puff into the lungs 2 times daily    Bronchospasm       losartan 50 MG tablet    COZAAR    135 tablet    TAKE ONE AND ONE-HALF TABLETS BY MOUTH ONCE DAILY    Essential hypertension       meclizine 12.5 MG tablet    ANTIVERT    30 tablet    Take 2 tablets (25 mg) by mouth 3 times daily as needed for dizziness

## 2018-09-29 LAB — ENA SM IGG SER-ACNC: <0.2 AI (ref 0–0.9)

## 2018-10-01 LAB
ANA SER QL IF: NEGATIVE
DSDNA AB SER-ACNC: 1 IU/ML
HIV 1+2 AB+HIV1 P24 AG SERPL QL IA: NONREACTIVE

## 2018-10-02 LAB — LA PPP-IMP: NEGATIVE

## 2018-10-02 NOTE — PROGRESS NOTES
Kidney, liver function are normal.  Electrolytes normal.  Blood sugar is in the normal range for a nonfasting sample.  A1c is at goal.  All the inflammatory/autoimmune markers are negative.  Awaiting one final autoimmune test.

## 2018-11-08 ENCOUNTER — TELEPHONE (OUTPATIENT)
Dept: FAMILY MEDICINE | Facility: CLINIC | Age: 48
End: 2018-11-08

## 2018-12-10 DIAGNOSIS — I10 ESSENTIAL HYPERTENSION: ICD-10-CM

## 2018-12-11 RX ORDER — LOSARTAN POTASSIUM 50 MG/1
TABLET ORAL
Qty: 135 TABLET | Refills: 1 | Status: SHIPPED | OUTPATIENT
Start: 2018-12-11 | End: 2020-05-08

## 2018-12-11 NOTE — TELEPHONE ENCOUNTER
Prescription approved per [facility] Refill Protocol.    Thank you    Mahi ROWLAND RN

## 2018-12-11 NOTE — TELEPHONE ENCOUNTER
"Requested Prescriptions   Pending Prescriptions Disp Refills     losartan (COZAAR) 50 MG tablet [Pharmacy Med Name: LOSARTAN 50MG TABLETS] 135 tablet 0    Last Written Prescription Date:  8/24/18  Last Fill Quantity: 135,  # refills: 0   Last office visit: 9/28/2018 with prescribing provider:  Afshin   Future Office Visit:     Sig: TAKE 1 AND 1/2 TABLETS BY MOUTH EVERY DAY    Angiotensin-II Receptors Passed - 12/10/2018  3:43 PM       Passed - Blood pressure under 140/90 in past 12 months    BP Readings from Last 3 Encounters:   09/28/18 130/80   09/12/18 130/84   04/27/18 142/84                Passed - Recent (12 mo) or future (30 days) visit within the authorizing provider's specialty    Patient had office visit in the last 12 months or has a visit in the next 30 days with authorizing provider or within the authorizing provider's specialty.  See \"Patient Info\" tab in inbasket, or \"Choose Columns\" in Meds & Orders section of the refill encounter.             Passed - Patient is age 18 or older       Passed - Normal serum creatinine on file in past 12 months    Recent Labs   Lab Test 09/28/18  1633   CR 0.98            Passed - Normal serum potassium on file in past 12 months    Recent Labs   Lab Test 09/28/18  1633   POTASSIUM 3.9                      "

## 2019-01-10 ENCOUNTER — TELEPHONE (OUTPATIENT)
Dept: FAMILY MEDICINE | Facility: CLINIC | Age: 49
End: 2019-01-10

## 2019-01-10 DIAGNOSIS — J45.30 MILD PERSISTENT ASTHMA WITHOUT COMPLICATION: ICD-10-CM

## 2019-01-10 DIAGNOSIS — L20.82 FLEXURAL ECZEMA: ICD-10-CM

## 2019-01-10 DIAGNOSIS — E11.9 TYPE 2 DIABETES MELLITUS WITHOUT COMPLICATION, WITHOUT LONG-TERM CURRENT USE OF INSULIN (H): Primary | ICD-10-CM

## 2019-01-10 RX ORDER — TRIAMCINOLONE ACETONIDE 1 MG/G
CREAM TOPICAL
Qty: 15 G | Refills: 0 | Status: SHIPPED | OUTPATIENT
Start: 2019-01-10 | End: 2022-04-29

## 2019-01-10 NOTE — TELEPHONE ENCOUNTER
Oral steroids are not indicated for the treatment of eczema flare ups.  Steroid ointments are given, I can fill the Kenalog cream for him (pended, [facility] pharmacy).  When he was given Medrol 1/2018 it was for vestibular neuritis, not for skin or lung issues.

## 2019-01-10 NOTE — TELEPHONE ENCOUNTER
Med sent to preferred pharmacy.    Ok with albuterol refill - per #1 below.    Routing to provider.  Susie ZARATE RN

## 2019-01-10 NOTE — TELEPHONE ENCOUNTER
Left message for patient to return call.  Patient needs non fasting lab and clinic appt with Dr. Pepe.

## 2019-01-10 NOTE — TELEPHONE ENCOUNTER
Panel Management:    Patient is due for a diabetes check up.  They are due for non-fasting blood work.  A1C    Orders were placed, please have lab work done before visit with Dr. Pepe.  Please notify patient to schedule an appointment.  Please remind them to bring meter to visit so we can download blood sugar data.

## 2019-01-10 NOTE — TELEPHONE ENCOUNTER
Panel Management Review      Patient has the following on his problem list:     Diabetes    Composite cancer screening  Chart review shows that this patient is due/due soon for the following None  Summary:    Patient is due/failing the following:   Panel Management:   Patient is due for a diabetes check up.  They are due for non-fasting blood work.  A1C   Orders were placed, please have lab work done before visit with Dr. Pepe.  Please notify patient to schedule an appointment.  Please remind them to bring meter to visit so we can download blood sugar data.         Type of outreach:    Phone, spoke to patient.  He was reminded. He will call back to schedule. He states he is trying to deal with other things too. See phone note.     Timmy ZARATE CMA

## 2019-01-10 NOTE — TELEPHONE ENCOUNTER
"While on the phone with patient for panel management regarding Diabetes, he had more questions.   1.  He first stated he is still having breathing issues. He misplaced his referrals for Pulmonology, I stated I can give him the info, he asked that these be printed and left at the . This was done.  - He is requesting a refill of the Albuterol inhaler.     2. He then stated he has been having increase Eczema symptoms and he is requesting a refill of the Medrol dose pack he had in the past for it ( las prescribed 1/2018) He stated this also helped his breathing.   I advised him he may need to be seen for these things. He stated \"It costs him $50 dollars every time he comes in and also cost of meds.   I advised patient I will send a note to Dr. Pepe and we will call him back .     - Thank You ,   Timmy ZARATE CMA    "

## 2019-01-11 RX ORDER — ALBUTEROL SULFATE 90 UG/1
2 AEROSOL, METERED RESPIRATORY (INHALATION) EVERY 4 HOURS PRN
Qty: 1 INHALER | Refills: 3 | Status: SHIPPED | OUTPATIENT
Start: 2019-01-11 | End: 2020-11-30

## 2019-03-28 ENCOUNTER — TELEPHONE (OUTPATIENT)
Dept: FAMILY MEDICINE | Facility: CLINIC | Age: 49
End: 2019-03-28

## 2019-03-28 DIAGNOSIS — E11.9 TYPE 2 DIABETES MELLITUS WITHOUT COMPLICATION, WITHOUT LONG-TERM CURRENT USE OF INSULIN (H): Primary | ICD-10-CM

## 2019-03-28 NOTE — TELEPHONE ENCOUNTER
Panel Management:    Patient is due for a diabetes check up.  They are due for fasting blood work.  Lipid panel, A1C and Microalbumin    Orders were placed, please have lab work done before visit with Dr. Pepe.  Please notify patient to schedule an appointment.  Please remind them to bring meter to visit so we can download blood sugar data.

## 2019-03-28 NOTE — TELEPHONE ENCOUNTER
(1st attempt) Left a voice msg for pt to call back.  When he does schedule fasting labs + diabetes follow up with PCP.  Remind pt to bring meter.  Ciara Villalba CMA (JOSE)   (aka: Ange Villalba)

## 2019-03-28 NOTE — LETTER
April 11, 2019      Ilya Villagran  [address] [address]        Dear Ilya,     In order to ensure we are providing the best quality care, we have reviewed your chart and see that you are due for:  A diabetic office visit with fasting lab work.  Bring your glucometer to your appointment and we will download your information.    Please call the clinic at your earliest convenience to schedule a lab and clinic appointment.    Thank you for trusting us with your health care.  Sincerely,    Your [facility] Team/lw

## 2019-04-04 NOTE — TELEPHONE ENCOUNTER
Panel Management Review      Patient has the following on his problem list:     Diabetes    ASA:     Last A1C  Lab Results   Component Value Date    A1C 5.5 09/28/2018    A1C 5.2 04/27/2018    A1C 6.7 01/10/2018    A1C 6.0 08/22/2017     A1C tested: Passed    Last LDL:    Lab Results   Component Value Date    CHOL 203 04/27/2018     Lab Results   Component Value Date    HDL 44 04/27/2018     Lab Results   Component Value Date     04/27/2018     Lab Results   Component Value Date    TRIG 158 04/27/2018     No results found for: CHOLHDLRATIO  Lab Results   Component Value Date    NHDL 159 04/27/2018       Is the patient on a Statin? NO             Is the patient on Aspirin? NO        Last three blood pressure readings:  BP Readings from Last 3 Encounters:   09/28/18 130/80   09/12/18 130/84   04/27/18 142/84       Date of last diabetes office visit: 9/28/18     Tobacco History:     History   Smoking Status     Never Smoker   Smokeless Tobacco     Never Used           Composite cancer screening  Chart review shows that this patient is due/due soon for the following None  Summary:    Patient is due/failing the following:   Diabetes check and labs    Action needed:   Patient needs office visit for diabetes check and fasting labs.    Type of outreach:    Phone, left message for patient to call back.     Questions for provider review:    None                                                                                                                                    RONAN Jordan MA       Chart routed to Care Team .

## 2019-04-25 ENCOUNTER — TELEPHONE (OUTPATIENT)
Dept: INTERNAL MEDICINE | Facility: CLINIC | Age: 49
End: 2019-04-25

## 2019-04-25 NOTE — TELEPHONE ENCOUNTER
Quality review: Asthma  No asthma on problem list.  Route to the PCP for review.  DX sep/2018  Pt still will need ACT  Ciara Villalba CMA (JOSE)   (aka: Ange Villalba)

## 2019-07-18 ENCOUNTER — TELEPHONE (OUTPATIENT)
Dept: INTERNAL MEDICINE | Facility: CLINIC | Age: 49
End: 2019-07-18

## 2019-07-18 NOTE — TELEPHONE ENCOUNTER
(1st attempt) Left a voice msg for pt to call back.  Whenever he does schedule fasting labs prior to the office visit for diabetes follow up with primary care provider. Remind pt to bring his meter.  Ciara Villalba CMA (JOSE)   (aka: Ange Villalba)

## 2019-07-18 NOTE — LETTER
July 25, 2019      Ilya Villagran  [address] [address]          Dear Ilya Villagran, [id number]      At [facility] we care about your health and are committed to providing quality patient care, which includes staying current on preventativescreenings.  You can increase your chances of finding and treating diseases through regular screenings.      Our records show that you are due for the following screening(s):      Fasting lab work prior to office visit with primary care provider    Diabetes follow up with primary care provider    Remember to bring your meter      Our phone to schedule the appointments above is [phone]    If you have a My-Chart Account, you also can schedule this appointment through there.    If you have already had one or all of the above screening tests at another facility, please call us so that we may update your chart.      Your partners in health,      Quality Committee   [facility]

## 2019-08-01 NOTE — TELEPHONE ENCOUNTER
(3rd attempt) Left a voice msg for pt to call back.  Whenever he does schedule fasting labs prior to the office visit for diabetes follow up with primary care provider. Remind pt to bring his meter.  Ciara Villalba CMA (JOSE)   (aka: Ange Villalba)

## 2019-08-15 ENCOUNTER — TELEPHONE (OUTPATIENT)
Dept: INTERNAL MEDICINE | Facility: CLINIC | Age: 49
End: 2019-08-15

## 2019-08-15 NOTE — TELEPHONE ENCOUNTER
Patient is due for a diabetes check up.  They are due for fasting blood work.  Lipid panel, A1C and Microalbumin (per provider)  Also needs ACT.  Ciara Villalba CMA (JOSE)   (aka: Ange Villalba)

## 2019-08-23 NOTE — TELEPHONE ENCOUNTER
After huddle with the PCP about pt concerns below, PCP okay 40 minutes appointment for the pt (one time only) labs prior.  Left a voice msg for pt to call back and schedule 40 minutes.  Ciara Villalba CMA (JOSE)   (aka: Ange Villalba)

## 2019-08-23 NOTE — TELEPHONE ENCOUNTER
Spoke with the pt today and he verbalize that he has several issues going on and wants to see the Dr. For all of them at once, CMA verbalize that depending in how many issues, sometimes is hard for the provider go through everything at once, since pt already need to be seeing for DM/BP/LDL. So pt will call back to schedule the appointment.  Ciara Villalba CMA (AAMA)   (aka: Ange Villalba)

## 2019-10-22 ENCOUNTER — OFFICE VISIT (OUTPATIENT)
Dept: URGENT CARE | Facility: URGENT CARE | Age: 49
End: 2019-10-22
Payer: COMMERCIAL

## 2019-10-22 VITALS
BODY MASS INDEX: 34.97 KG/M2 | HEART RATE: 83 BPM | OXYGEN SATURATION: 97 % | HEIGHT: 72 IN | WEIGHT: 258.2 LBS | TEMPERATURE: 98.2 F | DIASTOLIC BLOOD PRESSURE: 92 MMHG | SYSTOLIC BLOOD PRESSURE: 174 MMHG | RESPIRATION RATE: 17 BRPM

## 2019-10-22 DIAGNOSIS — M79.645 FINGER PAIN, LEFT: Primary | ICD-10-CM

## 2019-10-22 PROCEDURE — 99213 OFFICE O/P EST LOW 20 MIN: CPT | Performed by: PHYSICIAN ASSISTANT

## 2019-10-22 ASSESSMENT — MIFFLIN-ST. JEOR: SCORE: 2073.16

## 2019-10-23 NOTE — PROGRESS NOTES
SUBJECTIVE:   Ilya Villagran is a 49 year old male presenting with a chief complaint of   Chief Complaint   Patient presents with     Musculoskeletal Problem     2 hours ago hurt the middle finger on his left hand, no pain it's popped off at joint a little numb.       He is an established patient of [facility].    MS Injury/Pain    Onset of symptoms was 2 hour(s) ago.  Location: left middle finger   Context:       The injury happened while at home      Mechanism: jammed finger      Patient experienced minimal pain but unable to straighten tip of left middle finger   Course of symptoms is same.    Severity moderate  Current and Associated symptoms: unable to straighten finger, no significant pain  Denies  Swelling, Bruising, Warmth and Redness  Aggravating Factors: none  Therapies to improve symptoms include: none  This is the first time this type of problem has occurred for this patient.       Review of Systems   Constitutional: Negative for chills, fatigue, fever and unexpected weight change.   HENT: Negative for congestion, ear pain, rhinorrhea, sinus pressure, sinus pain and sore throat.    Eyes: Negative for pain, discharge, redness and itching.   Respiratory: Negative for cough, shortness of breath and wheezing.    Cardiovascular: Negative for chest pain, palpitations and leg swelling.   Gastrointestinal: Negative for abdominal pain, constipation, diarrhea, nausea and vomiting.   Musculoskeletal: Negative for arthralgias and myalgias.        Unable to straighten middle finger    Skin: Negative for rash.       Past Medical History:   Diagnosis Date     Type 2 diabetes mellitus without complication, without long-term current use of insulin (H) 1/12/2018     Family History   Problem Relation Age of Onset     Diabetes Mother      Asthma Mother      Depression Brother      Myocardial Infarction Brother      Hypertension Cousin      Current Outpatient Medications   Medication Sig Dispense Refill     albuterol (PROAIR  "HFA/PROVENTIL HFA/VENTOLIN HFA) 108 (90 Base) MCG/ACT inhaler Inhale 2 puffs into the lungs every 4 hours as needed for shortness of breath / dyspnea 1 Inhaler 3     blood glucose (NO BRAND SPECIFIED) lancets standard Use to test blood sugar 1 times daily or as directed. 100 each 11     blood glucose monitoring (NO BRAND SPECIFIED) meter device kit Use to test blood sugar 1 times daily or as directed. 1 kit 0     blood glucose monitoring (NO BRAND SPECIFIED) test strip Use to test blood sugars 1 times daily or as directed 100 strip 3     triamcinolone (KENALOG) 0.1 % external cream Apply sparingly to affected area three times daily for 14 days. 15 g 0     azelastine (ASTELIN) 0.1 % spray Spray 2 sprays into both nostrils 2 times daily as needed (Patient not taking: Reported on 9/28/2018) 30 mL 3     fluticasone (FLONASE) 50 MCG/ACT spray Spray 1-2 sprays into both nostrils daily (Patient not taking: Reported on 9/28/2018) 1 Bottle 11     fluticasone-salmeterol (ADVAIR) 250-50 MCG/DOSE diskus inhaler Inhale 1 puff into the lungs 2 times daily (Patient not taking: Reported on 10/22/2019) 1 Inhaler 11     losartan (COZAAR) 50 MG tablet TAKE 1 AND 1/2 TABLETS BY MOUTH EVERY DAY (Patient not taking: Reported on 10/22/2019) 135 tablet 1     meclizine (ANTIVERT) 12.5 MG tablet Take 2 tablets (25 mg) by mouth 3 times daily as needed for dizziness (Patient not taking: Reported on 9/28/2018) 30 tablet 0     Social History     Tobacco Use     Smoking status: Never Smoker     Smokeless tobacco: Never Used   Substance Use Topics     Alcohol use: Yes     Comment: 12 pack per week       OBJECTIVE  BP (!) 174/92 (BP Location: Right arm, Patient Position: Sitting, Cuff Size: Adult Large)   Pulse 83   Temp 98.2  F (36.8  C)   Resp 17   Ht 1.835 m (6' 0.25\")   Wt 117.1 kg (258 lb 3.2 oz)   SpO2 97%   BMI 34.78 kg/m      Physical Exam  Constitutional:       General: He is not in acute distress.     Appearance: Normal appearance. "   Musculoskeletal:      Comments: Patient unable to straighten tip of left middle finger. No significant pain, swelling, or redness with palpation. Good capillary refill.    Skin:     General: Skin is warm and dry.   Neurological:      Mental Status: He is alert.   Psychiatric:         Mood and Affect: Mood normal.         Speech: Speech normal.         Behavior: Behavior normal.         Labs:  No results found for this or any previous visit (from the past 24 hour(s)).    X-Ray was not done.    ASSESSMENT:    No diagnosis found.     Medical Decision Making:    Differential Diagnosis:  MS Injury Pain: sprain, tendonitis, muscle strain and contusion    Serious Comorbid Conditions:  Adult:  None    PLAN:    MS Injury/Pain  Concerned about a tendon injury. Will have patient follow up with orthopedics for further evaluation.     Followup:    Follow up with orthopedics     There are no Patient Instructions on file for this visit.

## 2019-10-25 ASSESSMENT — ENCOUNTER SYMPTOMS
FATIGUE: 0
MYALGIAS: 0
ABDOMINAL PAIN: 0
NAUSEA: 0
ARTHRALGIAS: 0
CHILLS: 0
EYE PAIN: 0
WHEEZING: 0
SINUS PRESSURE: 0
CONSTIPATION: 0
VOMITING: 0
RHINORRHEA: 0
SHORTNESS OF BREATH: 0
SORE THROAT: 0
DIARRHEA: 0
UNEXPECTED WEIGHT CHANGE: 0
PALPITATIONS: 0
EYE DISCHARGE: 0
EYE REDNESS: 0
SINUS PAIN: 0
COUGH: 0
FEVER: 0
EYE ITCHING: 0

## 2020-02-13 ENCOUNTER — OFFICE VISIT (OUTPATIENT)
Dept: FAMILY MEDICINE | Facility: CLINIC | Age: 50
End: 2020-02-13
Payer: COMMERCIAL

## 2020-02-13 VITALS
BODY MASS INDEX: 33.92 KG/M2 | DIASTOLIC BLOOD PRESSURE: 92 MMHG | SYSTOLIC BLOOD PRESSURE: 146 MMHG | TEMPERATURE: 98.7 F | WEIGHT: 250.4 LBS | HEART RATE: 92 BPM | OXYGEN SATURATION: 97 % | HEIGHT: 72 IN | RESPIRATION RATE: 16 BRPM

## 2020-02-13 DIAGNOSIS — F41.9 ANXIETY AND DEPRESSION: Primary | ICD-10-CM

## 2020-02-13 DIAGNOSIS — M54.41 CHRONIC RIGHT-SIDED LOW BACK PAIN WITH RIGHT-SIDED SCIATICA: ICD-10-CM

## 2020-02-13 DIAGNOSIS — F32.A ANXIETY AND DEPRESSION: Primary | ICD-10-CM

## 2020-02-13 DIAGNOSIS — G89.29 CHRONIC RIGHT-SIDED LOW BACK PAIN WITH RIGHT-SIDED SCIATICA: ICD-10-CM

## 2020-02-13 DIAGNOSIS — L30.9 ECZEMA, UNSPECIFIED TYPE: ICD-10-CM

## 2020-02-13 DIAGNOSIS — E11.9 TYPE 2 DIABETES MELLITUS WITHOUT COMPLICATION, WITHOUT LONG-TERM CURRENT USE OF INSULIN (H): ICD-10-CM

## 2020-02-13 LAB
ANION GAP SERPL CALCULATED.3IONS-SCNC: 6 MMOL/L (ref 3–14)
BUN SERPL-MCNC: 15 MG/DL (ref 7–30)
CALCIUM SERPL-MCNC: 8.7 MG/DL (ref 8.5–10.1)
CHLORIDE SERPL-SCNC: 104 MMOL/L (ref 94–109)
CO2 SERPL-SCNC: 28 MMOL/L (ref 20–32)
CREAT SERPL-MCNC: 0.82 MG/DL (ref 0.66–1.25)
CREAT UR-MCNC: 237 MG/DL
GFR SERPL CREATININE-BSD FRML MDRD: >90 ML/MIN/{1.73_M2}
GLUCOSE SERPL-MCNC: 172 MG/DL (ref 70–99)
HBA1C MFR BLD: 6.7 % (ref 0–5.6)
LDLC SERPL DIRECT ASSAY-MCNC: 122 MG/DL
MICROALBUMIN UR-MCNC: 45 MG/L
MICROALBUMIN/CREAT UR: 19.03 MG/G CR (ref 0–17)
POTASSIUM SERPL-SCNC: 4.1 MMOL/L (ref 3.4–5.3)
SODIUM SERPL-SCNC: 138 MMOL/L (ref 133–144)
TSH SERPL DL<=0.005 MIU/L-ACNC: 1.25 MU/L (ref 0.4–4)

## 2020-02-13 PROCEDURE — 84443 ASSAY THYROID STIM HORMONE: CPT | Performed by: FAMILY MEDICINE

## 2020-02-13 PROCEDURE — 99214 OFFICE O/P EST MOD 30 MIN: CPT | Performed by: FAMILY MEDICINE

## 2020-02-13 PROCEDURE — 80048 BASIC METABOLIC PNL TOTAL CA: CPT | Performed by: FAMILY MEDICINE

## 2020-02-13 PROCEDURE — 83036 HEMOGLOBIN GLYCOSYLATED A1C: CPT | Performed by: FAMILY MEDICINE

## 2020-02-13 PROCEDURE — 36415 COLL VENOUS BLD VENIPUNCTURE: CPT | Performed by: FAMILY MEDICINE

## 2020-02-13 PROCEDURE — 82043 UR ALBUMIN QUANTITATIVE: CPT | Performed by: FAMILY MEDICINE

## 2020-02-13 PROCEDURE — 83721 ASSAY OF BLOOD LIPOPROTEIN: CPT | Performed by: FAMILY MEDICINE

## 2020-02-13 RX ORDER — ACETAMINOPHEN 500 MG
500-1000 TABLET ORAL EVERY 6 HOURS PRN
Start: 2020-02-13

## 2020-02-13 RX ORDER — SERTRALINE HYDROCHLORIDE 25 MG/1
25 TABLET, FILM COATED ORAL DAILY
Qty: 30 TABLET | Refills: 0 | Status: SHIPPED | OUTPATIENT
Start: 2020-02-13 | End: 2020-03-18

## 2020-02-13 RX ORDER — CETIRIZINE HYDROCHLORIDE 10 MG/1
10 TABLET ORAL DAILY
Start: 2020-02-13 | End: 2020-11-30

## 2020-02-13 ASSESSMENT — MIFFLIN-ST. JEOR: SCORE: 2033.81

## 2020-02-13 NOTE — PROGRESS NOTES
Subjective     Ilya Villagran is a 49 year old male who presents to clinic today for the following health issues:  Chief Complaint   Patient presents with     Derm Problem     Pt here for excema.     Depression     Pt also here for depression symptoms.      Musculoskeletal Problem     Right hip pain.       HPI   Patient has been lost to follow up and has stopped all previously prescribed medications.  Patient admits he is very anxious this visit due to below.    Abnormal Mood Symptoms  Onset: ongoing, worse over the past couple years    Description:   Depression: YES  Anxiety: YES    Accompanying Signs & Symptoms:  Still participating in activities that you used to enjoy: not as much  Fatigue: YES  Irritability: YES  Difficulty concentrating: YES  Changes in appetite: no  Problems with sleep: YES- ongoing, trouble falling asleep at bedtime  Heart racing/beating fast : no  Thoughts of hurting yourself or others: none    History:   Recent stress: YES- pt was kicked out of his house last night by his fiance  Prior depression hospitalization: None  Family history of depression: YES- brother  Family history of anxiety: YES- father    Precipitating factors:   Alcohol/drug use: YES- drinks on the weekends    Alleviating factors:  sleeping    Therapies Tried and outcome: Pt has been on meds in the past but only for a couple months, did not like the way they made him feel  Pt set up appt for family counseling in 1 month.    Musculoskeletal problem/pain      Duration: past couple years    Description  Location: right hip radiating to right leg.    Intensity:  moderate    Accompanying signs and symptoms: radiation of pain to shoots down his leg to back of heel when driving    History  Previous similar problem: no   Previous evaluation:  none    Precipitating or alleviating factors:  Trauma or overuse: YES-  at work, overuse maybe  Aggravating factors include: changing positions from sitting to standing and  walking    Therapies tried and outcome: Ibuprofen helps a little    excema      Duration: ongoing    Description (location/character/radiation): has spread all over    Intensity:  moderate, severe    Accompanying signs and symptoms: itchy    History (similar episodes/previous evaluation): dx in the past    Precipitating or alleviating factors: None    Therapies tried and outcome: triamcinalone cream-did not help, otc products     Type 2 Diabetes.  No recent provider visit for management. Due for several labs.  Patient not on medication.      Patient Active Problem List   Diagnosis     Essential hypertension     Hyperglycemia     Morbid obesity due to excess calories (H)     Type 2 diabetes mellitus without complication, without long-term current use of insulin (H)     Flexural eczema     Chronic cough     Past Surgical History:   Procedure Laterality Date     LAPAROSCOPIC APPENDECTOMY  12/18/2011    Procedure:LAPAROSCOPIC APPENDECTOMY; Surgeon:JEAN CLAUDE LUNA; Location:[facility]       Social History     Tobacco Use     Smoking status: Never Smoker     Smokeless tobacco: Never Used   Substance Use Topics     Alcohol use: Yes     Comment: 12 pack per week     Family History   Problem Relation Age of Onset     Diabetes Mother      Asthma Mother      Depression Brother      Myocardial Infarction Brother      Hypertension Cousin          Current Outpatient Medications   Medication Sig Dispense Refill     acetaminophen (TYLENOL) 500 MG tablet Take 1-2 tablets (500-1,000 mg) by mouth every 6 hours as needed for mild pain       cetirizine (ZYRTEC) 10 MG tablet Take 1 tablet (10 mg) by mouth daily       sertraline (ZOLOFT) 25 MG tablet Take 1 tablet (25 mg) by mouth daily 30 tablet 0     albuterol (PROAIR HFA/PROVENTIL HFA/VENTOLIN HFA) 108 (90 Base) MCG/ACT inhaler Inhale 2 puffs into the lungs every 4 hours as needed for shortness of breath / dyspnea (Patient not taking: Reported on 2/13/2020) 1 Inhaler 3     azelastine  (ASTELIN) 0.1 % spray Spray 2 sprays into both nostrils 2 times daily as needed (Patient not taking: Reported on 9/28/2018) 30 mL 3     blood glucose (NO BRAND SPECIFIED) lancets standard Use to test blood sugar 1 times daily or as directed. (Patient not taking: Reported on 2/13/2020) 100 each 11     blood glucose monitoring (NO BRAND SPECIFIED) meter device kit Use to test blood sugar 1 times daily or as directed. (Patient not taking: Reported on 2/13/2020) 1 kit 0     blood glucose monitoring (NO BRAND SPECIFIED) test strip Use to test blood sugars 1 times daily or as directed (Patient not taking: Reported on 2/13/2020) 100 strip 3     fluticasone (FLONASE) 50 MCG/ACT spray Spray 1-2 sprays into both nostrils daily (Patient not taking: Reported on 9/28/2018) 1 Bottle 11     fluticasone-salmeterol (ADVAIR) 250-50 MCG/DOSE diskus inhaler Inhale 1 puff into the lungs 2 times daily (Patient not taking: Reported on 10/22/2019) 1 Inhaler 11     losartan (COZAAR) 50 MG tablet TAKE 1 AND 1/2 TABLETS BY MOUTH EVERY DAY (Patient not taking: Reported on 10/22/2019) 135 tablet 1     meclizine (ANTIVERT) 12.5 MG tablet Take 2 tablets (25 mg) by mouth 3 times daily as needed for dizziness (Patient not taking: Reported on 9/28/2018) 30 tablet 0     triamcinolone (KENALOG) 0.1 % external cream Apply sparingly to affected area three times daily for 14 days. (Patient not taking: Reported on 2/13/2020) 15 g 0     Allergies   Allergen Reactions     Ace Inhibitors Cough     Reviewed and updated as needed this visit by Provider  Tobacco  Allergies  Meds  Problems  Med Hx  Surg Hx  Fam Hx         Review of Systems   ROS COMP: Constitutional, HEENT, cardiovascular, pulmonary, GI, , musculoskeletal, neuro, skin, endocrine and psych systems are negative, except as otherwise noted.      Objective    BP (!) 146/92   Pulse 92   Temp 98.7  F (37.1  C) (Tympanic)   Resp 16   Ht 1.829 m (6')   Wt 113.6 kg (250 lb 6.4 oz)   SpO2  97%   BMI 33.96 kg/m    Body mass index is 33.96 kg/m .  Physical Exam   GEN: alert, oriented x 3, NAD  SKIN: good turgor; erythematous, dry scaly plaques on anterior upper chest (mostly erythematous and scaly), popliteal areas (moderately excoriated), and lesser extent in postauricular area; mild scalp seborrhea with no alopecia  BACK: no TTP; normal curvature; SLR right positive at 45 degrees to mid calf, left negative, full range of motion with mild pain on forward flexion per patient   RIGHT HIP; full range of motion with no pain or crepitation  PSYCH: well-kempt, linear thought process, anxious mood, appropriate affect, no suicidality/aggression/hallucination      Diagnostic Test Results:  none         Assessment & Plan     Ilya was seen today for derm problem, depression and musculoskeletal problem.    Diagnoses and all orders for this visit:    Anxiety and depression  -     sertraline (ZOLOFT) 25 MG tablet; Take 1 tablet (25 mg) by mouth daily  Discussed course and treatment of mood  disorder.  Advised options for medical treatment.  Recommended multimodal approach to management: medication and counseling.  Discussed possible side effects of meds.  Discussed maintenance med may take a few weeks before noticing significant benefit.  Advised suicidal and homicidal precautions.    Chronic right-sided low back pain with right-sided sciatica  -     PHYSICAL THERAPY REFERRAL; Future  -     acetaminophen (TYLENOL) 500 MG tablet; Take 1-2 tablets (500-1,000 mg) by mouth every 6 hours as needed for mild pain  Patient reports the radicular symptoms are only when he is driving. Usually it is just the right low back and posterior right hip pain that occurs after prolonged sitting.  No red flags today.  Start with physical therapy. This can also help evaluate the back and function as well to determine need for imaging.  Activity as tolerated.  Safe use of OTC APAP discussed.  Return precautions discussed and given to  patient.    Eczema, unspecified type  -     cetirizine (ZYRTEC) 10 MG tablet; Take 1 tablet (10 mg) by mouth daily  -     DERMATOLOGY REFERRAL  Advised patient possible causes.  Many cases, no specific cause identified. Can be idiopathic.  Advised treatment options.  Trial of above Rx. Patient asked about prednisone. Patient was advised not the best choice to treat due to possible rebound dermatitis, worsening glucose due to his DM.  Consult dermatology due to extent of   Return precautions discussed and given to patient.    Type 2 diabetes mellitus without complication, without long-term current use of insulin (H)  -     Hemoglobin A1c  -     Basic metabolic panel  -     TSH with free T4 reflex  -     Albumin Random Urine Quantitative with Creat Ratio  -     LDL cholesterol direct  Patient was reminded he is overdue to monitor his diabetes. Patient concurred to the above testing.  When results available, will schedule patient back for diabetes management.  Patient was advised of risks of uncotontrolled DM. He verbalized understanding and concurred to follow through.    recheck BP on follo wup. Patient was anxious on exam.      Patient Instructions     Start sertraline 25 mg daily for depression/anxiety.  Pursue family counseling.  Possible referral to behavior therapist in the near future.  Take your medication as directed.  Full effect/benefit of the medications may not be evident until a few weeks after start.  Treatment of anxiety/depression involves also counseling. Take adequate sleep, and exercise regularly.  If you experience suicidal thoughts, thoughts of hurting others or hallucinations, see a doctor right away.        Acetaminophen 500 mg orally 1-2 tabs every 4-6 hrs as needed for pain.  Schedule physical therapy evaluation and treatment for the sciatica.  If no improvement after 6-8 weeks of physical therapy, see a provider again. You may see a provider sooner if with rapid worsening before then.  Avoid  activities that increase pain.    Schedule dermatology consult for the eczema.  cetirizine 10 mg orally every night.  Apply Cerave creme liberally on the skin from neck all the way to feet after every shower, and 1-2 other times of the day if able.  Use Dove body wash for showering. Do not use bar soaps at all.    You will be contacted in 1-2 days for results of your lab tests.  Likely follow up in next week or so for diabetes management.  Patient Education     Depression  Depression is one of the most common mental health problems today. It is not just a state of unhappiness or sadness. It is a true disease. The cause seems to be related to a decrease in chemicals that transmit signals in the brain. Having a family history of depression, alcoholism, or suicide increases the risk. Chronic illness, chronic pain, migraine headaches, and high emotional stress also increase the risk.  Depression is something we tend to recognize in others, but may have a hard time seeing in ourselves. It can show in many physical and emotional ways:    Loss of appetite    Overeating    Not being able to sleep    Sleeping too much    Tiredness not related to physical exertion    Restlessness or irritability    Slowness of movement or speech    Feeling depressed or withdrawn    Loss of interest in things you once enjoyed    Trouble concentrating, poor memory, trouble making decisions    Thoughts of harming or killing oneself, or thoughts that life is not worth living    Low self-esteem  The treatment for depression may include both medicine and psychotherapy. Antidepressants can reduce suffering and can improve the ability to function during the depressed period. Therapy can offer emotional support and help you understand emotional factors that may be causing the depression.  Home care    Ongoing care and support help people manage this disease. Find a healthcare provider and therapist who meet your needs. Seek help when you feel like you  may be getting ill.    Be kind to yourself. Make it a point to do things that you enjoy (gardening, walking in nature, going to a movie). Reward yourself for small successes.    Take care of your physical body. Eat a balanced diet (low in saturated fat and high in fruits and vegetables). Exercise at least 3 times a week for 30 minutes. Even mild-moderate exercise (like brisk walking) can make you feel better.    Don't drink alcohol, which can make depression worse.    Take medicine as prescribed.    Tell each of your healthcare providers about all of the prescription and over-the-counter medicines, vitamins, and supplements you take. Certain supplements interact with medicines and can result in dangerous side effects. Ask your pharmacist when you have questions about medicine interactions.    Talk with your family and trusted friends about your feelings and thoughts. Ask them to help you recognize behavior changes early so you can get help and, if needed, medicine can be adjusted.    Follow-up care  Follow up with your healthcare provider, or as advised.  Call 911  Call 911 if you:    Have suicidal thoughts, a suicide plan, and the means to carry out the plan; or serious thoughts of hurting someone else     Have trouble breathing    Are very confused    Feel very drowsy or have trouble awakening    Faint or lose consciousness    Have new chest pain that becomes more severe, lasts longer, or spreads into your shoulder, arm, neck, jaw, or back  When to seek medical advice  Call your healthcare provider right away if any of these happen:    Feeling extreme depression, fear, anxiety, or anger toward yourself or others    Feeling out of control    Feeling that you may try to harm yourself or another    Hearing voices that others do not hear    Seeing things that others do not see    Can t sleep or eat for 3 days in a row    Friends or family express concern over your behavior and ask you to seek help  Date Last Reviewed:  10/1/2017    [id number] [employer]. [address] [place] [address], [place] [address]. All rights reserved. This information is not intended as a substitute for professional medical care. Always follow your healthcare professional's instructions.           Patient Education     Managing Atopic Dermatitis (Eczema)     After bathing, gently pat your skin dry (don t rub). Apply moisturizer while your skin is still damp.   To manage your symptoms and help reduce the severity and frequency, try these self-care tips:  Caring for your skin    Use a gentle, fragrance-free cleanser (or nonsoap cleanser) for bathing. Rinse well. Pat skin dry.    Take warm, not hot, baths or showers. Try to limit them to no more that 10 to 15 minutes.     Use moisturizer liberally right after you bathe, while your skin is still damp.    Avoid scratching because it will cause more damage to your skin.     Topical, over-the-counter hydrocortisone cream may help control mild symptoms.   Controlling your environment    Avoid extreme heat or cold.    Avoid very humid or very dry air.    If your home or office air is very dry, use a humidifier.    Avoid allergens, such as dust, that may be present in bedding, carpets, plush toys, or rugs.    Know that pet hair and dander can cause flare-ups.  Seeking medical treatment  Another way to keep symptoms under control is to seek medical treatment. Talk with your healthcare provider about the type of treatment that may work best for you. Your provider may prescribe treatments such as the following:    Topical treatments to put on the skin daily    Medicines taken by mouth (oral medicines), such as antihistamines, antibiotics, or corticosteroids    In severe cases shots (injections) may be needed to control the symptoms. You may even need antibiotics if skin infections occur.  Treatments don t work the same way for every person. So if your symptoms continue or get worse, ask your healthcare  provider about other treatments.  Making lifestyle choices    Manage the stress in your life.    Wear loose-fitting cotton clothing that does not bind or rub your skin.    Avoid contact with wool or other scratchy fabrics.    Use fragrance-free products.  Getting good results  Now that you know more about atopic dermatitis, the next step is up to you. Follow your healthcare provider s treatment plan and your self-care routine. This will help bring atopic dermatitis under control. If your symptoms persist, be sure to let your health care provider know.   Date Last Reviewed: 2/1/2017 2000-2019 [employer]. [address] [place], [place] [id number]. All rights reserved. This information is not intended as a substitute for professional medical care. Always follow your healthcare professional's instructions.           Patient Education     Understanding Lumbar Radiculopathy    Lumbar radiculopathy is irritation or inflammation of a nerve root in the low back. It causes symptoms that spread out from the back down one or both legs. To understand this condition, it helps to understand the parts of the spine:    Vertebrae. These are bones that stack to form the spine. The lumbar spine contains the 5 bottom vertebrae.    Disks. These are soft pads of tissue between the vertebrae. They act as shock absorbers for the spine.    Spinal canal. This is a tunnel formed within the stacked vertebrae. In the lumbar spine, nerves run through this canal.    Nerves. These branch off and leave the spinal canal, traveling out to parts of the body. As they leave the spinal canal, nerves pass through openings between the vertebrae. The nerve root is the part of the nerve that is closest to the spinal canal.    Sciatic nerve. This is a large nerve formed from several nerve roots in the low back. This nerve extends down the back of the leg to the foot.  With lumbar radiculopathy, nerve roots in the low back become irritated.  This leads to pain and symptoms. The sciatic nerve is commonly involved, so the condition is often called sciatica.  What causes lumbar radiculopathy?  Aging, injury, poor posture, extra body weight, and other issues can lead to problems in the low back. These problems may then irritate nerve roots. They include:    Damage to a disk in the lumbar spine. The damaged disk may then press on nearby nerve roots.    Degeneration from wear and tear, and aging. This can lead to narrowing (stenosis) of the openings between the vertebrae. The narrowed openings press on nerve roots as they leave the spinal canal.    Unstable spine. This is when a vertebra slips forward. It can then press on a nerve root.  Other, less common things can put pressure on nerves in the low back. These include diabetes, infection, or a tumor.  Symptoms of lumbar radiculopathy  These include:    Pain in the low back    Pain, numbness, tingling, or weakness that travels into the buttocks, hip, groin, or leg    Muscle spasms  Treatment for lumbar radiculopathy  In most cases, your healthcare provider will first try treatments that help relieve symptoms. These may include:    Prescription and over-the-counter pain medicines. These help relieve pain, swelling, and irritation.    Limits on positions and activities that increase pain. But lying in bed or avoiding all movement is only recommended for a short period of time.    Physical therapy, including exercises and stretches. This helps decrease pain and increase movement and function.    Steroid shots into the lower back. This may help relieve symptoms for a time.    Weight-loss program. If you are overweight, losing extra pounds may help relieve symptoms.  In some cases, you may need surgery to fix the underlying problem. This depends on the cause, the symptoms, and how long the pain has lasted.  Possible complications  Over time, an irritated and inflamed nerve may become damaged. This may lead to  long-lasting (permanent) numbness or weakness in your legs and feet. If symptoms change suddenly or get worse, be sure to let your healthcare provider know.  When to call your healthcare provider  Call your healthcare provider right away if you have any of these:    New pain or pain that gets worse    New or increasing weakness, tingling, or numbness in your leg or foot    Problems controlling your bladder or bowel   Date Last Reviewed: 3/10/2016    [id number] [employer]. [address] [place] [address]. All rights reserved. This information is not intended as a substitute for professional medical care. Always follow your healthcare professional's instructions.               Return in about 1 month (around 3/13/2020) for for depression follow up; sooner if needed for diabetes.    Favian Cole MD  [facility]

## 2020-02-13 NOTE — RESULT ENCOUNTER NOTE
Wait for all other labs.  A1c still within goal but higher than last year.  May likely manage with more aggressive lifestyle changes.

## 2020-02-13 NOTE — PATIENT INSTRUCTIONS
Start sertraline 25 mg daily for depression/anxiety.  Pursue family counseling.  Possible referral to behavior therapist in the near future.  Take your medication as directed.  Full effect/benefit of the medications may not be evident until a few weeks after start.  Treatment of anxiety/depression involves also counseling. Take adequate sleep, and exercise regularly.  If you experience suicidal thoughts, thoughts of hurting others or hallucinations, see a doctor right away.        Acetaminophen 500 mg orally 1-2 tabs every 4-6 hrs as needed for pain.  Schedule physical therapy evaluation and treatment for the sciatica.  If no improvement after 6-8 weeks of physical therapy, see a provider again. You may see a provider sooner if with rapid worsening before then.  Avoid activities that increase pain.    Schedule dermatology consult for the eczema.  cetirizine 10 mg orally every night.  Apply Cerave creme liberally on the skin from neck all the way to feet after every shower, and 1-2 other times of the day if able.  Use Dove body wash for showering. Do not use bar soaps at all.    You will be contacted in 1-2 days for results of your lab tests.  Likely follow up in next week or so for diabetes management.  Patient Education     Depression  Depression is one of the most common mental health problems today. It is not just a state of unhappiness or sadness. It is a true disease. The cause seems to be related to a decrease in chemicals that transmit signals in the brain. Having a family history of depression, alcoholism, or suicide increases the risk. Chronic illness, chronic pain, migraine headaches, and high emotional stress also increase the risk.  Depression is something we tend to recognize in others, but may have a hard time seeing in ourselves. It can show in many physical and emotional ways:    Loss of appetite    Overeating    Not being able to sleep    Sleeping too much    Tiredness not related to physical  exertion    Restlessness or irritability    Slowness of movement or speech    Feeling depressed or withdrawn    Loss of interest in things you once enjoyed    Trouble concentrating, poor memory, trouble making decisions    Thoughts of harming or killing oneself, or thoughts that life is not worth living    Low self-esteem  The treatment for depression may include both medicine and psychotherapy. Antidepressants can reduce suffering and can improve the ability to function during the depressed period. Therapy can offer emotional support and help you understand emotional factors that may be causing the depression.  Home care    Ongoing care and support help people manage this disease. Find a healthcare provider and therapist who meet your needs. Seek help when you feel like you may be getting ill.    Be kind to yourself. Make it a point to do things that you enjoy (gardening, walking in nature, going to a movie). Reward yourself for small successes.    Take care of your physical body. Eat a balanced diet (low in saturated fat and high in fruits and vegetables). Exercise at least 3 times a week for 30 minutes. Even mild-moderate exercise (like brisk walking) can make you feel better.    Don't drink alcohol, which can make depression worse.    Take medicine as prescribed.    Tell each of your healthcare providers about all of the prescription and over-the-counter medicines, vitamins, and supplements you take. Certain supplements interact with medicines and can result in dangerous side effects. Ask your pharmacist when you have questions about medicine interactions.    Talk with your family and trusted friends about your feelings and thoughts. Ask them to help you recognize behavior changes early so you can get help and, if needed, medicine can be adjusted.    Follow-up care  Follow up with your healthcare provider, or as advised.  Call 911  Call 911 if you:    Have suicidal thoughts, a suicide plan, and the means to  carry out the plan; or serious thoughts of hurting someone else     Have trouble breathing    Are very confused    Feel very drowsy or have trouble awakening    Faint or lose consciousness    Have new chest pain that becomes more severe, lasts longer, or spreads into your shoulder, arm, neck, jaw, or back  When to seek medical advice  Call your healthcare provider right away if any of these happen:    Feeling extreme depression, fear, anxiety, or anger toward yourself or others    Feeling out of control    Feeling that you may try to harm yourself or another    Hearing voices that others do not hear    Seeing things that others do not see    Can t sleep or eat for 3 days in a row    Friends or family express concern over your behavior and ask you to seek help  Date Last Reviewed: 10/1/2017    1077-6115 [employer]. [address] [place] [address], [place] [address]. All rights reserved. This information is not intended as a substitute for professional medical care. Always follow your healthcare professional's instructions.           Patient Education     Managing Atopic Dermatitis (Eczema)     After bathing, gently pat your skin dry (don t rub). Apply moisturizer while your skin is still damp.   To manage your symptoms and help reduce the severity and frequency, try these self-care tips:  Caring for your skin    Use a gentle, fragrance-free cleanser (or nonsoap cleanser) for bathing. Rinse well. Pat skin dry.    Take warm, not hot, baths or showers. Try to limit them to no more that 10 to 15 minutes.     Use moisturizer liberally right after you bathe, while your skin is still damp.    Avoid scratching because it will cause more damage to your skin.     Topical, over-the-counter hydrocortisone cream may help control mild symptoms.   Controlling your environment    Avoid extreme heat or cold.    Avoid very humid or very dry air.    If your home or office air is very dry, use a humidifier.    Avoid allergens,  such as dust, that may be present in bedding, carpets, plush toys, or rugs.    Know that pet hair and dander can cause flare-ups.  Seeking medical treatment  Another way to keep symptoms under control is to seek medical treatment. Talk with your healthcare provider about the type of treatment that may work best for you. Your provider may prescribe treatments such as the following:    Topical treatments to put on the skin daily    Medicines taken by mouth (oral medicines), such as antihistamines, antibiotics, or corticosteroids    In severe cases shots (injections) may be needed to control the symptoms. You may even need antibiotics if skin infections occur.  Treatments don t work the same way for every person. So if your symptoms continue or get worse, ask your healthcare provider about other treatments.  Making lifestyle choices    Manage the stress in your life.    Wear loose-fitting cotton clothing that does not bind or rub your skin.    Avoid contact with wool or other scratchy fabrics.    Use fragrance-free products.  Getting good results  Now that you know more about atopic dermatitis, the next step is up to you. Follow your healthcare provider s treatment plan and your self-care routine. This will help bring atopic dermatitis under control. If your symptoms persist, be sure to let your health care provider know.   Date Last Reviewed: 2/1/2017 2000-2019 [employer]. [address] [place] [address], [place] [address]. All rights reserved. This information is not intended as a substitute for professional medical care. Always follow your healthcare professional's instructions.           Patient Education     Understanding Lumbar Radiculopathy    Lumbar radiculopathy is irritation or inflammation of a nerve root in the low back. It causes symptoms that spread out from the back down one or both legs. To understand this condition, it helps to understand the parts of the spine:    Vertebrae. These are bones  that stack to form the spine. The lumbar spine contains the 5 bottom vertebrae.    Disks. These are soft pads of tissue between the vertebrae. They act as shock absorbers for the spine.    Spinal canal. This is a tunnel formed within the stacked vertebrae. In the lumbar spine, nerves run through this canal.    Nerves. These branch off and leave the spinal canal, traveling out to parts of the body. As they leave the spinal canal, nerves pass through openings between the vertebrae. The nerve root is the part of the nerve that is closest to the spinal canal.    Sciatic nerve. This is a large nerve formed from several nerve roots in the low back. This nerve extends down the back of the leg to the foot.  With lumbar radiculopathy, nerve roots in the low back become irritated. This leads to pain and symptoms. The sciatic nerve is commonly involved, so the condition is often called sciatica.  What causes lumbar radiculopathy?  Aging, injury, poor posture, extra body weight, and other issues can lead to problems in the low back. These problems may then irritate nerve roots. They include:    Damage to a disk in the lumbar spine. The damaged disk may then press on nearby nerve roots.    Degeneration from wear and tear, and aging. This can lead to narrowing (stenosis) of the openings between the vertebrae. The narrowed openings press on nerve roots as they leave the spinal canal.    Unstable spine. This is when a vertebra slips forward. It can then press on a nerve root.  Other, less common things can put pressure on nerves in the low back. These include diabetes, infection, or a tumor.  Symptoms of lumbar radiculopathy  These include:    Pain in the low back    Pain, numbness, tingling, or weakness that travels into the buttocks, hip, groin, or leg    Muscle spasms  Treatment for lumbar radiculopathy  In most cases, your healthcare provider will first try treatments that help relieve symptoms. These may  include:    Prescription and over-the-counter pain medicines. These help relieve pain, swelling, and irritation.    Limits on positions and activities that increase pain. But lying in bed or avoiding all movement is only recommended for a short period of time.    Physical therapy, including exercises and stretches. This helps decrease pain and increase movement and function.    Steroid shots into the lower back. This may help relieve symptoms for a time.    Weight-loss program. If you are overweight, losing extra pounds may help relieve symptoms.  In some cases, you may need surgery to fix the underlying problem. This depends on the cause, the symptoms, and how long the pain has lasted.  Possible complications  Over time, an irritated and inflamed nerve may become damaged. This may lead to long-lasting (permanent) numbness or weakness in your legs and feet. If symptoms change suddenly or get worse, be sure to let your healthcare provider know.  When to call your healthcare provider  Call your healthcare provider right away if you have any of these:    New pain or pain that gets worse    New or increasing weakness, tingling, or numbness in your leg or foot    Problems controlling your bladder or bowel   Date Last Reviewed: 3/10/2016    4552-5680 [employer]. [address] [place] [address], [place] [address]. All rights reserved. This information is not intended as a substitute for professional medical care. Always follow your healthcare professional's instructions.

## 2020-02-14 DIAGNOSIS — E11.65 TYPE 2 DIABETES MELLITUS WITH HYPERGLYCEMIA, WITHOUT LONG-TERM CURRENT USE OF INSULIN (H): Primary | ICD-10-CM

## 2020-02-14 RX ORDER — LISINOPRIL 2.5 MG/1
2.5 TABLET ORAL DAILY
Qty: 90 TABLET | Refills: 0 | Status: SHIPPED | OUTPATIENT
Start: 2020-02-14 | End: 2020-05-08

## 2020-02-17 ENCOUNTER — TELEPHONE (OUTPATIENT)
Dept: FAMILY MEDICINE | Facility: CLINIC | Age: 50
End: 2020-02-17

## 2020-02-17 NOTE — TELEPHONE ENCOUNTER
Diabetes Education Scheduling Outreach #1:    Call to patient to schedule. Left message with phone number to call to schedule.    Plan for 2nd outreach attempt within 1 week.    Josee Justice  [facility]  Diabetes and Nutrition Scheduling

## 2020-02-19 ENCOUNTER — TELEPHONE (OUTPATIENT)
Dept: INTERNAL MEDICINE | Facility: CLINIC | Age: 50
End: 2020-02-19

## 2020-02-19 PROBLEM — F41.9 ANXIETY AND DEPRESSION: Status: ACTIVE | Noted: 2020-02-19

## 2020-02-19 PROBLEM — F32.A ANXIETY AND DEPRESSION: Status: ACTIVE | Noted: 2020-02-19

## 2020-02-19 NOTE — TELEPHONE ENCOUNTER
Panel Management Review      Patient has the following on his problem list:     Hypertension   Last three blood pressure readings:  BP Readings from Last 3 Encounters:   02/13/20 (!) 146/92   10/22/19 (!) 174/92   09/28/18 130/80     Blood pressure: FAILED    HTN Guidelines:  Less than 140/90        Action needed:   Patient needs nurse only appointment.    Type of outreach:    Sent letter.    Questions for provider review:    None                                                                                                                                    Ciara Villalba CMA ([facility])   (aka: Ange Villalba)       Chart routed to Care Team .

## 2020-02-19 NOTE — LETTER
Ilya Villagran  [address] [facility] [address]          02/20/20      Dear Ilya Villagran        Your most recent blood pressure reading preformed at our clinic was higher than we like to see it. The goal is to have it under 140/90 in clinic.    Please call our clinic [phone] to schedule a blood pressure recheck with our RN staff. This appointment is free of charges and it takes about 15 minutes to be complete.     Be sure to take all of your blood pressure medications, and avoid stimulants like caffeine, cold medicines, sudafed, or tobacco prior to your recheck.    If your blood pressure medication was changed by your provider recently wait 2 weeks before making this recheck appointment.     Thank you for trusting us with your health care.    Sincerely,    Your [facility] Care Team

## 2020-03-04 NOTE — TELEPHONE ENCOUNTER
Diabetes Education Scheduling Outreach #2:    Call to patient to schedule. Left message with phone number to call to schedule.    Josee Justice  [facility]  Diabetes and Nutrition Scheduling

## 2020-03-09 NOTE — TELEPHONE ENCOUNTER
Diabetes Education Scheduling Outreach #3:    Call to patient to schedule. Left message with phone number to call to schedule.    Letter sent to patient requesting to call to schedule.    [facility]  Diabetes and Nutrition Scheduling

## 2020-03-11 NOTE — TELEPHONE ENCOUNTER
(2nd attempt) Left a voice msg for pt to call back.  Whenever he calls back schedule BP recheck with RN.  Ciara Villalba CMA ([facility])   (aka: Ange Villalba)

## 2020-03-16 ENCOUNTER — TELEPHONE (OUTPATIENT)
Dept: FAMILY MEDICINE | Facility: CLINIC | Age: 50
End: 2020-03-16

## 2020-03-16 DIAGNOSIS — F32.A ANXIETY AND DEPRESSION: ICD-10-CM

## 2020-03-16 DIAGNOSIS — F41.9 ANXIETY AND DEPRESSION: ICD-10-CM

## 2020-03-16 NOTE — TELEPHONE ENCOUNTER
"Requested Prescriptions   Pending Prescriptions Disp Refills     sertraline (ZOLOFT) 25 MG tablet [Pharmacy Med Name: SERTRALINE HCL 25MG TABS] 30 tablet 0     Sig: TAKE ONE TABLET BY MOUTH ONCE DAILY       SSRIs Protocol Failed - 3/16/2020  3:15 PM  No flowsheet data found.            Failed - PHQ-9 score less than 5 in past 6 months     Please review last PHQ-9 score.   No flowsheet data found.        Passed - Medication is active on med list        Passed - Patient is age 18 or older        Passed - Recent (6 mo) or future (30 days) visit within the authorizing provider's specialty     Patient had office visit in the last 6 months or has a visit in the next 30 days with authorizing provider or within the authorizing provider's specialty.  See \"Patient Info\" tab in inbasket, or \"Choose Columns\" in Meds & Orders section of the refill encounter.               Last Written Prescription Date:  2/13/2020  Last Fill Quantity: 30,  # refills: 0   Last office visit: 2/13/2020 with prescribing provider:  Kelsey   Future Office Visit:   Next 5 appointments (look out 90 days)    Mar 17, 2020  3:20 PM CDT  Telephone Visit with Favian Cole MD  [facility]  Arrive at: Clinic [facility] [address] [facility] [address] [phone]           "

## 2020-03-16 NOTE — TELEPHONE ENCOUNTER
Panel Management Review      Composite cancer screening  Chart review shows that this patient is due/due soon for the following Colonoscopy  Summary:    Patient is due/failing the following:   COLONOSCOPY    Action needed:   Patient needs referral/order: colonoscopy    Type of outreach:    Sent letter.    Questions for provider review:    None                                                                                                                                    Mariana Greenberg CMA

## 2020-03-17 ENCOUNTER — VIRTUAL VISIT (OUTPATIENT)
Dept: FAMILY MEDICINE | Facility: CLINIC | Age: 50
End: 2020-03-17
Payer: COMMERCIAL

## 2020-03-17 DIAGNOSIS — Z53.9 ERRONEOUS ENCOUNTER--DISREGARD: Primary | ICD-10-CM

## 2020-03-17 NOTE — PROGRESS NOTES
"Ilya Villagran is a 49 year old male who is being evaluated via a billable telephone visit.      The patient has been notified of following:     \"This telephone visit will be conducted via a call between you and your physician/provider. We have found that certain health care needs can be provided without the need for a physical exam.  This service lets us provide the care you need with a short phone conversation.  If a prescription is necessary we can send it directly to your pharmacy.  If lab work is needed we can place an order for that and you can then stop by our lab to have the test done at a later time.    If during the course of the call the physician/provider feels a telephone visit is not appropriate, you will not be charged for this service.\"       Ilya Villagran complains of    Chief Complaint   Patient presents with     Anxiety     f/u anxiety/depression          I have reviewed and updated the patient's Past Medical History, Social History, Family History and Medication List.    ALLERGIES  Ace inhibitors    Mariana Greenberg CMA   (MA signature)    This encounter was opened in error. Please disregard.  "

## 2020-03-18 RX ORDER — SERTRALINE HYDROCHLORIDE 25 MG/1
TABLET, FILM COATED ORAL
Qty: 30 TABLET | Refills: 0 | Status: SHIPPED | OUTPATIENT
Start: 2020-03-18 | End: 2020-05-08

## 2020-03-18 NOTE — TELEPHONE ENCOUNTER
Do to the Cov-19 outbreak all preventative measures are postponed per protocol.  Ciara Villalba CMA ([facility])   (aka: Ange Villalba)

## 2020-03-18 NOTE — TELEPHONE ENCOUNTER
Routing refill request to provider for review/approval because:  Patient was scheduled for Virtual visit yesterday however states encounter was opened in error.  Would you like patient to reschedule?

## 2020-03-19 NOTE — TELEPHONE ENCOUNTER
Left message to check with his pharmacy for something he requested, and to please call us back at [phone] to reschedule the telephone office visit.     Tisha Christy RNC

## 2020-05-05 DIAGNOSIS — F41.9 ANXIETY AND DEPRESSION: ICD-10-CM

## 2020-05-05 DIAGNOSIS — F32.A ANXIETY AND DEPRESSION: ICD-10-CM

## 2020-05-07 NOTE — TELEPHONE ENCOUNTER
"Requested Prescriptions   Pending Prescriptions Disp Refills     sertraline (ZOLOFT) 25 MG tablet [Pharmacy Med Name: SERTRALINE HCL 25MG TABS] 30 tablet 0     Sig: TAKE ONE TABLET BY MOUTH ONCE DAILY       SSRIs Protocol Failed - 5/5/2020 11:30 AM  No flowsheet data found.            Failed - PHQ-9 score less than 5 in past 6 months     Please review last PHQ-9 score.   No flowsheet data found.          Passed - Medication is active on med list        Passed - Patient is age 18 or older        Passed - Recent (6 mo) or future (30 days) visit within the authorizing provider's specialty     Patient had office visit in the last 6 months or has a visit in the next 30 days with authorizing provider or within the authorizing provider's specialty.  See \"Patient Info\" tab in inbasket, or \"Choose Columns\" in Meds & Orders section of the refill encounter.               Last Written Prescription Date:  3/18/2020  Last Fill Quantity: 30,  # refills: 0   Last office visit: 2/13/2020 with prescribing provider:  Kelsey   Future Office Visit:            "

## 2020-05-08 ENCOUNTER — VIRTUAL VISIT (OUTPATIENT)
Dept: FAMILY MEDICINE | Facility: CLINIC | Age: 50
End: 2020-05-08
Payer: COMMERCIAL

## 2020-05-08 DIAGNOSIS — F32.A ANXIETY AND DEPRESSION: Primary | ICD-10-CM

## 2020-05-08 DIAGNOSIS — F41.9 ANXIETY AND DEPRESSION: Primary | ICD-10-CM

## 2020-05-08 DIAGNOSIS — I10 ESSENTIAL HYPERTENSION: ICD-10-CM

## 2020-05-08 PROCEDURE — 96127 BRIEF EMOTIONAL/BEHAV ASSMT: CPT | Mod: 59 | Performed by: FAMILY MEDICINE

## 2020-05-08 PROCEDURE — 99214 OFFICE O/P EST MOD 30 MIN: CPT | Mod: TEL | Performed by: FAMILY MEDICINE

## 2020-05-08 RX ORDER — SERTRALINE HYDROCHLORIDE 25 MG/1
TABLET, FILM COATED ORAL
Qty: 10 TABLET | Refills: 0 | Status: SHIPPED | OUTPATIENT
Start: 2020-05-08 | End: 2020-11-30

## 2020-05-08 RX ORDER — LOSARTAN POTASSIUM 50 MG/1
TABLET ORAL
Qty: 135 TABLET | Refills: 1 | Status: SHIPPED | OUTPATIENT
Start: 2020-05-08 | End: 2020-11-30

## 2020-05-08 RX ORDER — SERTRALINE HYDROCHLORIDE 25 MG/1
25 TABLET, FILM COATED ORAL DAILY
Qty: 30 TABLET | Refills: 4 | Status: SHIPPED | OUTPATIENT
Start: 2020-05-08 | End: 2020-11-30

## 2020-05-08 ASSESSMENT — ANXIETY QUESTIONNAIRES
5. BEING SO RESTLESS THAT IT IS HARD TO SIT STILL: NOT AT ALL
GAD7 TOTAL SCORE: 2
6. BECOMING EASILY ANNOYED OR IRRITABLE: MORE THAN HALF THE DAYS
3. WORRYING TOO MUCH ABOUT DIFFERENT THINGS: NOT AT ALL
2. NOT BEING ABLE TO STOP OR CONTROL WORRYING: NOT AT ALL
1. FEELING NERVOUS, ANXIOUS, OR ON EDGE: NOT AT ALL
7. FEELING AFRAID AS IF SOMETHING AWFUL MIGHT HAPPEN: NOT AT ALL

## 2020-05-08 ASSESSMENT — PATIENT HEALTH QUESTIONNAIRE - PHQ9
SUM OF ALL RESPONSES TO PHQ QUESTIONS 1-9: 3
5. POOR APPETITE OR OVEREATING: NOT AT ALL

## 2020-05-08 NOTE — PATIENT INSTRUCTIONS
(F41.9,  F32.9) Anxiety and depression  (primary encounter diagnosis)  Comment:   Plan: sertraline (ZOLOFT) 25 MG tablet        Restart the med at 25 mg daily and use the non drug therapies. This is refilled til September.   Make the follow up appt with Dr. Pepe.     (I10) Essential hypertension  Comment:   Plan: losartan (COZAAR) 50 MG tablet, **Creatinine         FUTURE anytime, **Potassium FUTURE anytime        Restart Losartan at 75 mg daily. Do the future labs in August and  Then call for the follow up appt.  Use the non drug therapies. Monitor and recheck in September. The goal for the BP average is under 130/80.

## 2020-05-08 NOTE — TELEPHONE ENCOUNTER
I refilled 10 additional tablets so he doesn't run out. 30 were filled a month ago with instruction that he needs to schedule phone visit. Needs to schedule virtual visit for depression follow up and update PHQ9

## 2020-05-08 NOTE — PROGRESS NOTES
"Ilya Villagran is a 49 year old male who is being evaluated via a billable telephone visit.      The patient has been notified of following:     \"This telephone visit will be conducted via a call between you and your physician/provider. We have found that certain health care needs can be provided without the need for a physical exam.  This service lets us provide the care you need with a short phone conversation.  If a prescription is necessary we can send it directly to your pharmacy.  If lab work is needed we can place an order for that and you can then stop by our lab to have the test done at a later time.    Telephone visits are billed at different rates depending on your insurance coverage. During this emergency period, for some insurers they may be billed the same as an in-person visit.  Please reach out to your insurance provider with any questions.    If during the course of the call the physician/provider feels a telephone visit is not appropriate, you will not be charged for this service.\"    Patient has given verbal consent for Telephone visit?  Yes    What phone number would you like to be contacted at? [phone]    How would you like to obtain your AVS? Mail a copy    Subjective     Ilya Villagran is a 49 year old male who presents to clinic today for the following health issues:    HPI  Depression and Anxiety Follow-Up    How are you doing with your depression since your last visit? Improved-when he is taking the medication.  His wife can notice when he is not on the medication.    How are you doing with your anxiety since your last visit?  Can have some symptoms-not any worse.    Are you having other symptoms that might be associated with depression or anxiety? No    Have you had a significant life event? No     Do you have any concerns with your use of alcohol or other drugs? No    Social History     Tobacco Use     Smoking status: Never Smoker     Smokeless tobacco: Never Used   Substance Use " Topics     Alcohol use: Yes     Comment: 12 pack per week     Drug use: Not Currently     Types: Marijuana     Comment: used in the past for 15 yrs     No flowsheet data found.  No flowsheet data found.  Last PHQ-9 5/8/2020   1.  Little interest or pleasure in doing things 0   2.  Feeling down, depressed, or hopeless 0   3.  Trouble falling or staying asleep, or sleeping too much 1   4.  Feeling tired or having little energy 2   5.  Poor appetite or overeating 0   6.  Feeling bad about yourself 0   7.  Trouble concentrating 0   8.  Moving slowly or restless 0   Q9: Thoughts of better off dead/self-harm past 2 weeks 0   PHQ-9 Total Score 3     KIKE-7  5/8/2020   1. Feeling nervous, anxious, or on edge 0   2. Not being able to stop or control worrying 0   3. Worrying too much about different things 0   4. Trouble relaxing 0   5. Being so restless that it is hard to sit still 0   6. Becoming easily annoyed or irritable 2   7. Feeling afraid, as if something awful might happen 0   KIKE-7 Total Score 2       Suicide Assessment Five-step Evaluation and Treatment (SAFE-T)      How many servings of fruits and vegetables do you eat daily?  2-3    On average, how many sweetened beverages do you drink each day (Examples: soda, juice, sweet tea, etc.  Do NOT count diet or artificially sweetened beverages)?   0    How many days per week do you exercise enough to make your heart beat faster? He is active at work.  Not as much at home.    How many minutes a day do you exercise enough to make your heart beat faster? Tries to be active at home.    How many days per week do you miss taking your medication? He has been out of his medications.       BLOOD PRESSURE:  He has been out of his blood pressure medications.  Also not taking some of the other medications for asthma.        Current Outpatient Medications:      acetaminophen (TYLENOL) 500 MG tablet, Take 1-2 tablets (500-1,000 mg) by mouth every 6 hours as needed for mild pain  (Patient not taking: Reported on 5/8/2020), Disp: , Rfl:      albuterol (PROAIR HFA/PROVENTIL HFA/VENTOLIN HFA) 108 (90 Base) MCG/ACT inhaler, Inhale 2 puffs into the lungs every 4 hours as needed for shortness of breath / dyspnea (Patient not taking: Reported on 2/13/2020), Disp: 1 Inhaler, Rfl: 3     azelastine (ASTELIN) 0.1 % spray, Spray 2 sprays into both nostrils 2 times daily as needed (Patient not taking: Reported on 5/8/2020), Disp: 30 mL, Rfl: 3     blood glucose (NO BRAND SPECIFIED) lancets standard, Use to test blood sugar 1 times daily or as directed. (Patient not taking: Reported on 2/13/2020), Disp: 100 each, Rfl: 11     blood glucose monitoring (NO BRAND SPECIFIED) meter device kit, Use to test blood sugar 1 times daily or as directed. (Patient not taking: Reported on 2/13/2020), Disp: 1 kit, Rfl: 0     blood glucose monitoring (NO BRAND SPECIFIED) test strip, Use to test blood sugars 1 times daily or as directed (Patient not taking: Reported on 2/13/2020), Disp: 100 strip, Rfl: 3     cetirizine (ZYRTEC) 10 MG tablet, Take 1 tablet (10 mg) by mouth daily (Patient not taking: Reported on 5/8/2020), Disp: , Rfl:      fluticasone (FLONASE) 50 MCG/ACT spray, Spray 1-2 sprays into both nostrils daily (Patient not taking: Reported on 9/28/2018), Disp: 1 Bottle, Rfl: 11     fluticasone-salmeterol (ADVAIR) 250-50 MCG/DOSE diskus inhaler, Inhale 1 puff into the lungs 2 times daily (Patient not taking: Reported on 10/22/2019), Disp: 1 Inhaler, Rfl: 11     lisinopril (ZESTRIL) 2.5 MG tablet, Take 1 tablet (2.5 mg) by mouth daily (Patient not taking: Reported on 5/8/2020), Disp: 90 tablet, Rfl: 0     losartan (COZAAR) 50 MG tablet, TAKE 1 AND 1/2 TABLETS BY MOUTH EVERY DAY (Patient not taking: Reported on 10/22/2019), Disp: 135 tablet, Rfl: 1     meclizine (ANTIVERT) 12.5 MG tablet, Take 2 tablets (25 mg) by mouth 3 times daily as needed for dizziness (Patient not taking: Reported on 9/28/2018), Disp: 30 tablet,  Rfl: 0     sertraline (ZOLOFT) 25 MG tablet, TAKE ONE TABLET BY MOUTH ONCE DAILY (Patient not taking: Reported on 5/8/2020), Disp: 10 tablet, Rfl: 0     triamcinolone (KENALOG) 0.1 % external cream, Apply sparingly to affected area three times daily for 14 days. (Patient not taking: Reported on 2/13/2020), Disp: 15 g, Rfl: 0    Patient Active Problem List   Diagnosis     Essential hypertension     Hyperglycemia     Morbid obesity due to excess calories (H)     Type 2 diabetes mellitus without complication, without long-term current use of insulin (H)     Flexural eczema     Chronic cough     Anxiety and depression     (F41.9,  F32.9) Anxiety and depression  (primary encounter diagnosis)  Comment:   Plan: sertraline (ZOLOFT) 25 MG tablet        Restart the med at 25 mg daily and use the non drug therapies. This is refilled til September.   Make the follow up appt with Dr. Pepe.     (I10) Essential hypertension  Comment:   Plan: losartan (COZAAR) 50 MG tablet, **Creatinine         FUTURE anytime, **Potassium FUTURE anytime        Restart Losartan at 75 mg daily. Do the future labs in August and  Then call for the follow up appt.  Use the non drug therapies. Monitor and recheck in September. The goal for the BP average is under 130/80.       José Sun MD      Phone call duration: 17  minutes

## 2020-05-09 ASSESSMENT — ANXIETY QUESTIONNAIRES: GAD7 TOTAL SCORE: 2

## 2020-09-14 ENCOUNTER — TELEPHONE (OUTPATIENT)
Dept: INTERNAL MEDICINE | Facility: CLINIC | Age: 50
End: 2020-09-14

## 2020-09-14 NOTE — LETTER
October 8, 2020      Ilya Villagran  [address] [address]        Dear Ilya Villagran,        At [facility] we care about your health and are committed to providing quality patient care. It has come to our attention that you are due for an Asthma Control Test.     This screening tool helps us to assess how well your asthma is controlled. Good asthma control leads to less asthma symptoms and greater health. If your asthma is not in good control (score less than 20) it is recommended you be seen by your provider for medication and lifestyle adjustments.    We are sending you a ACT form (Asthma control test) please sent this back to us as soon as possible.    Thank you for your time.      Sincerely,    Your Team at [facility]

## 2020-09-14 NOTE — LETTER
October 8, 2020      Ilya Villagran  [address] [address] [address]          Dear Ilya Villagran, [phone]        This is a friendly reminder that you are due for depression follow up with a simple, quick depression questionnaire called a PHQ-9. Please complete the attached questionnaire and return it to us at your earliest convenience.     This questionnaire has two main purposes:     1)  It helps your healthcare provider to determine your response to the care we have provided you and helps us guide further management of your mental well being.    2)  Reassure your healthcare provider that your symptoms are stable, if you are no longer experiencing depression symptoms.      If you have any questions, concerns or have trouble completing this questionnaire, please reach out to us through [facility] or by calling our clinic at [phone].      Sincerely,    Dr. Pepe's care Team/SHANA Urbina (A[facility])

## 2020-11-23 ENCOUNTER — HOSPITAL ENCOUNTER (EMERGENCY)
Facility: CLINIC | Age: 50
Discharge: HOME | End: 2020-11-23
Attending: NURSE PRACTITIONER | Admitting: NURSE PRACTITIONER
Payer: COMMERCIAL

## 2020-11-23 VITALS
TEMPERATURE: 97.1 F | RESPIRATION RATE: 18 BRPM | SYSTOLIC BLOOD PRESSURE: 160 MMHG | DIASTOLIC BLOOD PRESSURE: 103 MMHG | BODY MASS INDEX: 33.91 KG/M2 | OXYGEN SATURATION: 99 % | WEIGHT: 250 LBS | HEART RATE: 96 BPM

## 2020-11-23 DIAGNOSIS — Z20.822 ENCOUNTER FOR LABORATORY TESTING FOR COVID-19 VIRUS: ICD-10-CM

## 2020-11-23 PROCEDURE — 99213 OFFICE O/P EST LOW 20 MIN: CPT | Performed by: NURSE PRACTITIONER

## 2020-11-23 PROCEDURE — G0463 HOSPITAL OUTPT CLINIC VISIT: HCPCS | Performed by: NURSE PRACTITIONER

## 2020-11-23 PROCEDURE — U0003 INFECTIOUS AGENT DETECTION BY NUCLEIC ACID (DNA OR RNA); SEVERE ACUTE RESPIRATORY SYNDROME CORONAVIRUS 2 (SARS-COV-2) (CORONAVIRUS DISEASE [COVID-19]), AMPLIFIED PROBE TECHNIQUE, MAKING USE OF HIGH THROUGHPUT TECHNOLOGIES AS DESCRIBED BY CMS-2020-01-R: HCPCS | Performed by: NURSE PRACTITIONER

## 2020-11-23 PROCEDURE — C9803 HOPD COVID-19 SPEC COLLECT: HCPCS | Performed by: NURSE PRACTITIONER

## 2020-11-23 ASSESSMENT — ENCOUNTER SYMPTOMS
JOINT SWELLING: 0
COUGH: 1
FEVER: 0
NAUSEA: 0
DIZZINESS: 0
FATIGUE: 0
NUMBNESS: 0
HEADACHES: 1
SHORTNESS OF BREATH: 0
LIGHT-HEADEDNESS: 0
SORE THROAT: 0
SINUS PRESSURE: 0
ARTHRALGIAS: 0
MYALGIAS: 0
EYE REDNESS: 0
ABDOMINAL PAIN: 0
VOMITING: 0
RHINORRHEA: 0
CHILLS: 0
WEAKNESS: 0
TROUBLE SWALLOWING: 0
SINUS PAIN: 0
EYE DISCHARGE: 0

## 2020-11-23 NOTE — DISCHARGE INSTRUCTIONS
"Discharge Instructions for COVID-19 Patients  You have--or may have--COVID-19. Please follow the instructions listed below.   If you have a weakened immune system, discuss with your doctor any other actions you need to take.  How can I protect others?  If you have symptoms (fever, cough, body aches or trouble breathing):  Stay home and away from others (self-isolate) until:  At least 10 days have passed since your symptoms started, And   You've had no fever--and no medicine that reduces fever--for 1 full day (24 hours), And    Your other symptoms have resolved (gotten better).  If you don't show symptoms, but testing showed that you have COVID-19:  Stay home and away from others (self-isolate). Follow the tips under \"How do I self-isolate?\" below for 10 days (20 days if you have a weak immune system).  You don't need to be retested for COVID-19 before going back to school or work. As long as you're fever-free and feeling better, you can go back to school, work and other activities after waiting the 10 or 20 days.   How do I self-isolate?  Stay in your own room, even for meals. Use your own bathroom if you can.  Stay away from others in your home. No hugging, kissing or shaking hands. No visitors.  Don't go to work, school or anywhere else.  Clean \"high touch\" surfaces often (doorknobs, counters, handles). Use household cleaning spray or wipes. You'll find a full list of  on the EPA website: www.epa.gov/pesticide-registration/list-n-disinfectants-use-against-sars-cov-2.  Cover your mouth and nose with a mask or other face covering to avoid spreading germs.  Wash your hands and face often. Use soap and water.  Caregivers in these groups are at risk for severe illness due to COVID-19:  People 65 years and older  People who live in a nursing home or long-term care facility  People with chronic disease (lung, heart, cancer, diabetes, kidney, liver, immunologic)  People who have a weakened immune system, including " those who:  Are in cancer treatment  Take medicine that weakens the immune system, such as corticosteroids  Had a bone marrow or organ transplant  Have an immune deficiency  Have poorly controlled HIV or AIDS  Are obese (body mass index of 40 or higher)  Smoke regularly  Caregivers should wear gloves while washing dishes, handling laundry and cleaning bedrooms and bathrooms.  Use caution when washing and drying laundry: Don't shake dirty laundry and use the warmest water setting that you can.  For more tips on managing your health at home, go to www.cdc.gov/coronavirus/2019-ncov/downloads/10Things.pdf.  How can I take care of myself at home?  Get lots of rest. Drink extra fluids (unless a doctor has told you not to).    Take Tylenol (acetaminophen) for fever or pain. If you have liver or kidney problems, ask your family doctor if it's okay to take Tylenol.     Adults can take either:  650 mg (two 325 mg pills) every 4 to 6 hours, or   1,000 mg (two 500 mg pills) every 8 hours as needed.  Note: Don't take more than 3,000 mg in one day. Acetaminophen is found in many medicines (both prescribed and over-the-counter medicines). Read all labels to be sure you don't take too much.   For children, check the Tylenol bottle for the right dose. The dose is based on the child's age or weight.  If you have other health problems (like cancer, heart failure, an organ transplant or severe kidney disease): Call your specialty clinic if you don't feel better in the next 2 days.    Know when to call 911. Emergency warning signs include:  Trouble breathing or shortness of breath  Pain or pressure in the chest that doesn't go away  Feeling confused like you haven't felt before, or not being able to wake up  Bluish-colored lips or face    Your doctor may have prescribed a blood thinner medicine. Follow their instructions.  Where can I get more information?   [facility] - About COVID-19: [email or web address]  [facility] - What to  Do If You're Sick: www.cdc.gov/coronavirus/2019-ncov/about/steps-when-sick.html  CDC - Ending Home Isolation: www.cdc.gov/coronavirus/2019-ncov/hcp/disposition-in-home-patients.html  CDC - Caring for Someone: www.cdc.gov/coronavirus/2019-ncov/if-you-are-sick/care-for-someone.html  [facility] - Interim Guidance for Hospital Discharge to Home: www.[facility].mn./diseases/coronavirus/hcp/hospdischarge.pdf  [facility] clinical trials (COVID-19 research studies): clinicalaffairs.[facility]-clinical-trials  Below are the COVID-19 hotlines at the [place] [facility] ([facility]). Interpreters are available.  For health questions: Call 184-282-7640 or 1-330.516.7225 (7 a.m. to 7 p.m.)  For questions about schools and childcare: Call 188-291-0035 or 1-381.757.3660 (7 a.m. to 7 p.m.)    For informational purposes only. Not to replace the advice of your health care provider. Clinically reviewed by the Infection Prevention Team. Copyright   2020 [facility]. All rights reserved. [employer] 250801 - REV 08/04/20.

## 2020-11-23 NOTE — ED AVS SNAPSHOT
[facility] Emergency Dept  [address] [address]  Phone: [phone]  Fax: [phone]                                    Ilya Villagran   MRN: [id number]    Department: [facility] Emergency Dept   Date of Visit: 11/23/2020           After Visit Summary Signature Page    I have received my discharge instructions, and my questions have been answered. I have discussed any challenges I see with this plan with the nurse or doctor.    ..........................................................................................................................................  Patient/Patient Representative Signature      ..........................................................................................................................................  Patient Representative Print Name and Relationship to Patient    ..................................................               ................................................  Date                                   Time    ..........................................................................................................................................  Reviewed by Signature/Title    ...................................................              ..............................................  Date                                               Time          22EPIC Rev 08/18

## 2020-11-23 NOTE — ED PROVIDER NOTES
"  History     Chief Complaint   Patient presents with     Covid 19 Testing     HPI  Ilya Villagran is a 50 year old male who presents urgent care for COVID-19 testing.  Patient's wife tested positive for COVID-19 today.  Patient reports \"minor symptoms\" of a slight cough and headache. Denies fever, dizziness, congestion, sore throat, shortness of breath, chest pain, abdominal pain, nausea, vomiting, diarrhea. No change in taste or smell. No OTC medication use. Patient is a nonsmoker without asthma/COPD history.     Allergies:  Allergies   Allergen Reactions     Ace Inhibitors Cough       Problem List:    Patient Active Problem List    Diagnosis Date Noted     Anxiety and depression 02/19/2020     Priority: Medium     Flexural eczema 09/28/2018     Priority: Medium     Chronic cough 09/28/2018     Priority: Medium     Type 2 diabetes mellitus without complication, without long-term current use of insulin (H) 01/12/2018     Priority: Medium     Hyperglycemia 07/20/2017     Priority: Medium     Morbid obesity due to excess calories (H) 07/20/2017     Priority: Medium     Essential hypertension 07/19/2017     Priority: Medium        Past Medical History:    Past Medical History:   Diagnosis Date     Type 2 diabetes mellitus without complication, without long-term current use of insulin (H) 1/12/2018       Past Surgical History:    Past Surgical History:   Procedure Laterality Date     LAPAROSCOPIC APPENDECTOMY  12/18/2011    Procedure:LAPAROSCOPIC APPENDECTOMY; Surgeon:JEAN CLAUDE LUNA; Location:[place] OR       Family History:    Family History   Problem Relation Age of Onset     Diabetes Mother      Asthma Mother      Depression Brother      Myocardial Infarction Brother      Hypertension Cousin        Social History:  Marital Status:  Single [1]  Social History     Tobacco Use     Smoking status: Never Smoker     Smokeless tobacco: Never Used   Substance Use Topics     Alcohol use: Yes     Comment: 12 pack per week "     Drug use: Not Currently     Types: Marijuana     Comment: used in the past for 15 yrs        Medications:         acetaminophen (TYLENOL) 500 MG tablet       albuterol (PROAIR HFA/PROVENTIL HFA/VENTOLIN HFA) 108 (90 Base) MCG/ACT inhaler       azelastine (ASTELIN) 0.1 % spray       blood glucose (NO BRAND SPECIFIED) lancets standard       blood glucose monitoring (NO BRAND SPECIFIED) meter device kit       blood glucose monitoring (NO BRAND SPECIFIED) test strip       cetirizine (ZYRTEC) 10 MG tablet       fluticasone (FLONASE) 50 MCG/ACT spray       fluticasone-salmeterol (ADVAIR) 250-50 MCG/DOSE diskus inhaler       losartan (COZAAR) 50 MG tablet       sertraline (ZOLOFT) 25 MG tablet       sertraline (ZOLOFT) 25 MG tablet       triamcinolone (KENALOG) 0.1 % external cream          Review of Systems   Constitutional: Negative for chills, fatigue and fever.   HENT: Negative for congestion, ear discharge, ear pain, rhinorrhea, sinus pressure, sinus pain, sore throat and trouble swallowing.    Eyes: Negative for discharge and redness.   Respiratory: Positive for cough. Negative for shortness of breath.    Cardiovascular: Negative for chest pain.   Gastrointestinal: Negative for abdominal pain, nausea and vomiting.   Musculoskeletal: Negative for arthralgias, joint swelling and myalgias.   Skin: Negative for rash.   Neurological: Positive for headaches. Negative for dizziness, weakness, light-headedness and numbness.   All other systems reviewed and are negative.      Physical Exam   BP: (!) 160/103  Pulse: 96  Temp: 97.1  F (36.2  C)  Resp: 18  Weight: 113.4 kg (250 lb)  SpO2: 99 %      Physical Exam  Visit was completed using real time virtual video/chat while patient was physically in the department. Patient is showing no signs of respiratory distress or acute compromise. Able to speak clearly and in full sentences without difficulty. Alert and oriented. Ambulatory to and from the room without difficulty.  "    ED Course        Procedures    No results found for this or any previous visit (from the past 24 hour(s)).    Medications - No data to display    Assessments & Plan (with Medical Decision Making)   Ilya Villagran is a 50 year old male who presents urgent care for COVID-19 testing.  Patient's wife tested positive for COVID-19 today.  Patient reports \"minor symptoms\" of a slight cough and headache.  COVID-19 swab pending, home isolation in the interim.  Other members of the household to home quarantine as well. May use over the counter medications as needed and appropriate. Increase rest and hydration. Return precautions reviewed, all questions answered. Patient agreeable to plan of care and discharged in stable condition.    I have reviewed the nursing notes.    I have reviewed the findings, diagnosis, plan and need for follow up with the patient.  New Prescriptions    No medications on file     Final diagnoses:   Encounter for laboratory testing for COVID-19 virus     11/23/2020   [facility] EMERGENCY DEPT     Janet Raygoza, APRN CNP  11/23/20 1221    "

## 2020-11-24 LAB
SARS-COV-2 RNA SPEC QL NAA+PROBE: ABNORMAL
SPECIMEN SOURCE: ABNORMAL

## 2020-11-25 ENCOUNTER — PATIENT OUTREACH (OUTPATIENT)
Dept: CARE COORDINATION | Facility: CLINIC | Age: 50
End: 2020-11-25

## 2020-11-25 ENCOUNTER — TELEPHONE (OUTPATIENT)
Dept: NURSING | Facility: CLINIC | Age: 50
End: 2020-11-25

## 2020-11-25 ENCOUNTER — TELEPHONE (OUTPATIENT)
Dept: INTERNAL MEDICINE | Facility: CLINIC | Age: 50
End: 2020-11-25

## 2020-11-25 DIAGNOSIS — U07.1 2019 NOVEL CORONAVIRUS DISEASE (COVID-19): Primary | ICD-10-CM

## 2020-11-25 NOTE — LETTER
"[facility]  [address] [place] [address]      November 25, 2020    Ilya Villagran  [address] [address]      Dear Ilya,    I am a clinic care coordinator who works with Carin Pepe DO at [facility]. I am reaching out to you to introduce Clinic Care Coordination, which is an additional and voluntary source of support for our patients.     The clinic care coordinator team is made up of a registered nurse,  and community health worker who understand the health care system. The goal of clinic care coordination is to help you manage your health and improve access to the health care system in the most efficient manner. The team can assist you in meeting your health care goals by providing education, strengthening the communication among your providers and supporting you with any resource needs.     Please feel free to contact Selma, our community health worker at [phone] if you would like to schedule an initial assessment with a care coordinator. We recognize the ever-changing and stressful situation we are all facing with the current COVID-19 pandemic. We are focused on providing you with the highest-quality healthcare experience possible and ensuring you have the resources and information needed to remain safe and as healthy as possible.     Sincerely,     LAYTON Nolasco Care Coordinator  Part of the Care Coordination team at [facility]    Included below is some helpful patient information and resources related to COVID-19 that we share with all our patients who have had a recent COVID-19 test.  Instructions for Patients  What if my test result is positive?  If your test is positive and you have not viewed your result in [facility], you'll get a phone call with your result. (A positive test means that you have the virus.)     Follow the tips under \"How do I self-isolate?\" below for 10 days (20 days if " "you have a weak immune system).    You don't need to be retested for COVID-19 before going back to school or work. As long as you're fever-free and feeling better, you can go back to school, work and other activities after waiting the 10 or 20 days.  What if I have questions after I get my results?  If you have questions about your results, please visit our testing website at [email or web address].   After 7 to 10 days, if you have not gotten your results:     Call [phone] ([phone]) and ask to speak with our COVID-19 results team.    If you're being treated at an infusion center: Call your infusion center directly.  What are the symptoms of COVID-19?  Cough, fever and trouble breathing are the most common signs of COVID-19.  Other symptoms can include new headaches, new muscle or body aches, new and unexplained fatigue (feeling very tired), chills, sore throat, congestion (stuffy or runny nose), diarrhea (loose poop), loss of taste or smell, belly pain, and nausea or vomiting (feeling sick to your stomach or throwing up).  You may already have symptoms of COVID-19, or they may show up later.  What should I do if I have symptoms?  If you're having surgery: Call your surgeon's office.  For all other patients: Stay home and away from others (self-isolate) until ...    You've had no fever--and no medicine that reduces fever--for 1 full day (24 hours), AND    Other symptoms have gotten better. For example, your cough or breathing has improved, AND    At least 10 days have passed since your symptoms first started.  How do I self-isolate?    Stay in your own room, even for meals. Use your own bathroom if you can.    Stay away from others in your home. No hugging, kissing or shaking hands. No visitors.    Don't go to work, school or anywhere else.    Clean \"high touch\" surfaces often (doorknobs, counters, handles). Use household cleaning spray or wipes. You'll find a full list of "  on the EPA website: www.epa.gov/pesticide-registration/list-n-disinfectants-use-against-sars-cov-2.    Cover your mouth and nose with a mask or other face covering to avoid spreading germs.    Wash your hands and face often. Use soap and water.    Caregivers in these groups are at risk for severe illness due to COVID-19:  ? People 65 years and older  ? People who live in a nursing home or long-term care facility  ? People with chronic disease (lung, heart, cancer, diabetes, kidney, liver, immunologic)  ? People who have a weakened immune system, including those who:    Are in cancer treatment    Take medicine that weakens the immune system, such as corticosteroids    Had a bone marrow or organ transplant    Have an immune deficiency    Have poorly controlled HIV or AIDS    Are obese (body mass index of 40 or higher)    Smoke regularly    Caregivers should wear gloves while washing dishes, handling laundry and cleaning bedrooms and bathrooms.    Use caution when washing and drying laundry: Don't shake dirty laundry and use the warmest water setting that you can.    For more tips on managing your health at home, go to www.cdc.gov/coronavirus/2019-ncov/downloads/10Things.pdf.  How can I take care of myself at home?  1. Get lots of rest. Drink extra fluids (unless a doctor has told you not to).  2. Take Tylenol (acetaminophen) for fever or pain. If you have liver or kidney problems, ask your family doctor if it's OK to take Tylenol.   Adults can take either:  ? 650 mg (two 325 mg pills) every 4 to 6 hours, or   ? 1,000 mg (two 500 mg pills) every 8 hours as needed.  ? Note: Don't take more than 3,000 mg in one day. Acetaminophen is found in many medicines (both prescribed and over-the-counter medicines). Read all labels to be sure you don't take too much.   For children, check the Tylenol bottle for the right dose. The dose is based on the child's age or weight.  3. If you have other health problems (like  cancer, heart failure, an organ transplant or severe kidney disease): Call your specialty clinic if you don't feel better in the next 2 days.  4. Know when to call 911. Emergency warning signs include:  ? Trouble breathing or shortness of breath  ? Chest pain or pressure that doesn't go away  ? Feeling confused like you haven't felt before, or not being able to wake up  ? Bluish-colored lips or face  5. If your doctor prescribed a blood thinner medicine: Follow their instructions.  Where can I get more information?    [facility] - About COVID-19:   [email or web address]    CDC - If You're Sick: cdc.gov/coronavirus/2019-ncov/about/steps-when-sick.html    CDC - Ending Home Isolation: www.cdc.gov/coronavirus/2019-ncov/hcp/disposition-in-home-patients.html    CDC - Caring for Someone: www.cdc.gov/coronavirus/2019-ncov/if-you-are-sick/care-for-someone.html    [facility] - Interim Guidance for Hospital Discharge to Home: www.[facility].[place]./diseases/coronavirus/hcp/hospdischarge.pdf    [facility] clinical trials (COVID-19 research studies): clinicalaffairs.[facility]-clinical-trials    Below are the COVID-19 hotlines at the [place] Department of Health ([facility]). Interpreters are available.  ? For health questions: Call 890-064-3366 or 1-777.468.4338 (7 a.m. to 7 p.m.)  ? For questions about schools and childcare: Call 008-195-0309 or 1-855.445.2116 (7 a.m. to 7 p.m.)    For informational purposes only. Not to replace the advice of your health care provider. Clinically reviewed by Infection Prevention and the [facility] COVID-19 Clinical Team. Copyright   2020 [employer]. All rights reserved. [employer] 521324 - Rev 11/11/20.      Thank you for taking steps to prevent the spread of this virus.  o Limit your contact with others.  o Wear a simple mask to cover your cough.  o Wash your hands well and often.  o If you need medical care, go to [facility]org or contact your health care  provider.     For more about COVID-19 and caring for yourself at home, visit the CDC website at https://www.cdc.gov/coronavirus/2019-ncov/about/steps-when-sick.html.     To learn about care at [facility], please go to [email or web address].     [facility] clinical trials (COVID-19 research studies): clinicalaffairs.[facility]-clinical-trials.    Below are the COVID-19 hotlines at the [facility] ([facility]). Interpreters are available.     For health questions: Call 421-609-4995 or 1-125.888.5120 (7 a.m. to 7 p.m.)    For questions about schools and childcare: Call 832-485-0268 or 1-338.557.1993 (7 a.m. to 7 p.m.)

## 2020-11-25 NOTE — PROGRESS NOTES
Clinic Care Coordination Contact  [facility]/Voicemail       Clinical Data: Care Coordinator Outreach  Patient present to ED/Urgent Care on 11/23 with request for COVID-19 test. COVID-19 test returned as Positive prompting ambulatory care coordination referral and outreach.    Outreach attempted x 2.  Left message on patient's voicemail with call back information and encouraged return call if he has additional questions or concerns.    Plan: Care Coordinator will send care coordination introduction letter with care coordinator contact information and explanation of care coordination services. Care Coordinator will do no further outreaches at this time.    Constance Banegas, ABIELN, RN   [facility]  - Clinic Care Coordinator

## 2020-11-25 NOTE — TELEPHONE ENCOUNTER
DEVONTE to call clinic/RN to schedule an appt to discuss increasing Sertraline dose.  Last visit was Virtual on 5/8/20.  Darryn

## 2020-11-25 NOTE — TELEPHONE ENCOUNTER
"Coronavirus (COVID-19) Notification    Caller Name (Patient, parent, daughter/son, grandparent, etc)  The patient, Ilya.    Care advice reviewed with him. He verbalized understanding.    Reason for call  Notify of Positive Coronavirus (COVID-19) lab results, assess symptoms,  review  [employer] recommendations    Lab Result    Lab test:  2019-nCoV rRt-PCR or SARS-CoV-2 PCR    Oropharyngeal AND/OR nasopharyngeal swabs is POSITIVE for 2019-nCoV RNA/SARS-COV-2 PCR (COVID-19 virus)    RN Recommendations/Instructions per [facility] Coronavirus COVID-19 recommendations    Brief introduction script  Introduce self then review script:  \"I am calling on behalf of "[employer].  We were notified that your Coronavirus test (COVID-19) for was POSITIVE for the virus.  I have some information to relay to you but first I wanted to mention that the [place] Dept of Health will be contacting you shortly [it's possible MD already called Patient] to talk to you more about how you are feeling and other people you have had contact with who might now also have the virus.  Also,  [facility] is Partnering with the [facility] for Covid-19 research, you may be contacted directly by research staff.\"    Assessment (Inquire about Patient's current symptoms)   Assessment   Current Symptoms at time of phone call: (if no symptoms, document No symptoms] No symptoms.   Symptoms onset (if applicable) N/A     If at time of call, Patients symptoms hare worsened, the Patient should contact 911 or have someone drive them to Emergency Dept promptly:      If Patient calling 911, inform 911 personal that you have tested positive for the Coronavirus (COVID-19).  Place mask on and await 911 to arrive.    If Emergency Dept, If possible, please have another adult drive you to the Emergency Dept but you need to wear mask when in contact with other people.      Review information with Patient    Your result was positive. This means you " have COVID-19 (coronavirus).  We have sent you a letter that reviews the information that I'll be reviewing with you now.    How can I protect others?    If you have symptoms: stay home and away from others (self-isolate) until:    You've had no fever--and no medicine that reduces fever--for 1 full day (24 hours). And       Your other symptoms have gotten better. For example, your cough or breathing has improved. And     At least 10 days have passed since your symptoms started. (If you've been told by a doctor that you have a weak immune system, wait 20 days.)     If you don't have symptoms: Stay home and away from others (self-isolate) until at least 10 days have passed since your first positive COVID-19 test. (Date test collected)    During this time:    Stay in your own room, including for meals. Use your own bathroom if you can.    Stay away from others in your home. No hugging, kissing or shaking hands. No visitors.     Don't go to work, school or anywhere else.     Clean  high touch  surfaces often (doorknobs, counters, handles, etc.). Use a household cleaning spray or wipes. You'll find a full list on the EPA website at www.epa.gov/pesticide-registration/list-n-disinfectants-use-against-sars-cov-2.     Cover your mouth and nose with a mask, tissue or other face covering to avoid spreading germs.    Wash your hands and face often with soap and water.    Caregivers in these groups are at risk for severe illness due to COVID-19:  o People 65 years and older  o People who live in a nursing home or long-term care facility  o People with chronic disease (lung, heart, cancer, diabetes, kidney, liver, immunologic)  o People who have a weakened immune system, including those who:  - Are in cancer treatment  - Take medicine that weakens the immune system, such as corticosteroids  - Had a bone marrow or organ transplant  - Have an immune deficiency  - Have poorly controlled HIV or AIDS  - Are obese (body mass index of  40 or higher)  - Smoke regularly    Caregivers should wear gloves while washing dishes, handling laundry and cleaning bedrooms and bathrooms.    Wash and dry laundry with special caution. Don't shake dirty laundry, and use the warmest water setting you can.    If you have a weakened immune system, ask your doctor about other actions you should take.    For more tips, go to www.cdc.gov/coronavirus/2019-ncov/downloads/10Things.pdf.    You should not go back to work until you meet the guidelines above for ending your home isolation. You don't need to be retested for COVID-19 before going back to work--studies show that you won't spread the virus if it's been at least 10 days since your symptoms started (or 20 days, if you have a weak immune system).    Employers: This document serves as formal notice of your employee's medical guidelines for going back to work. They must meet the above guidelines before going back to work in person.    How can I take care of myself?    1. Get lots of rest. Drink extra fluids (unless a doctor has told you not to).    2. Take Tylenol (acetaminophen) for fever or pain. If you have liver or kidney problems, ask your family doctor if it's okay to take Tylenol.     Take either:     650 mg (two 325 mg pills) every 4 to 6 hours, or     1,000 mg (two 500 mg pills) every 8 hours as needed.     Note: Don't take more than 3,000 mg in one day. Acetaminophen is found in many medicines (both prescribed and over-the-counter medicines). Read all labels to be sure you don't take too much.    For children, check the Tylenol bottle for the right dose (based on their age or weight).    3. If you have other health problems (like cancer, heart failure, an organ transplant or severe kidney disease): Call your specialty clinic if you don't feel better in the next 2 days.    4. Know when to call 911: Emergency warning signs include:    Trouble breathing or shortness of breath    Pain or pressure in the chest  that doesn't go away    Feeling confused like you haven't felt before, or not being able to wake up    Bluish-colored lips or face    5. Sign up for [employer]. We know it's scary to hear that you have COVID-19. We want to track your symptoms to make sure you're okay over the next 2 weeks. Please look for an email from [employer]--this is a free, online program that we'll use to keep in touch. To sign up, follow the link in the email. Learn more at [email or web address].    Where can I get more information?    [facility]: [email or web address]/    Coronavirus Basics: www.[facility].[place]./diseases/coronavirus/basics.html    What to Do If You're Sick: www.cdc.gov/coronavirus/2019-ncov/about/steps-when-sick.html    Ending Home Isolation: www.cdc.gov/coronavirus/2019-ncov/hcp/disposition-in-home-patients.html     Caring for Someone with COVID-19: www.cdc.gov/coronavirus/2019-ncov/if-you-are-sick/care-for-someone.html     [facility] clinical trials (COVID-19 research studies): clinicalaffairs.[facility]/n-clinical-trials     A Positive COVID-19 letter will be sent via [employer] or the mail. (Exception, no letters sent to Presurgerical/Preprocedure Patients)    [Name]  Mayra Larios RN  [facility] Nurse Advisor  11/25/2020 at 4:49 PM

## 2020-11-25 NOTE — TELEPHONE ENCOUNTER
Patient is requesting a prescription for higher strength of sertraline. Currently taking 25 mg tablets, would like to be increased to sertraline 50 mg tablets. Please contact patient with any questions or concerns.     Thank You!    Feng Burton CPhT  [facility]

## 2020-11-30 ENCOUNTER — VIRTUAL VISIT (OUTPATIENT)
Dept: FAMILY MEDICINE | Facility: CLINIC | Age: 50
End: 2020-11-30
Payer: COMMERCIAL

## 2020-11-30 DIAGNOSIS — E11.9 TYPE 2 DIABETES MELLITUS WITHOUT COMPLICATION, WITHOUT LONG-TERM CURRENT USE OF INSULIN (H): ICD-10-CM

## 2020-11-30 DIAGNOSIS — M25.511 ACUTE PAIN OF RIGHT SHOULDER: ICD-10-CM

## 2020-11-30 DIAGNOSIS — R06.2 WHEEZING: ICD-10-CM

## 2020-11-30 DIAGNOSIS — F41.9 ANXIETY AND DEPRESSION: Primary | ICD-10-CM

## 2020-11-30 DIAGNOSIS — I10 ESSENTIAL HYPERTENSION: ICD-10-CM

## 2020-11-30 DIAGNOSIS — F32.A ANXIETY AND DEPRESSION: Primary | ICD-10-CM

## 2020-11-30 DIAGNOSIS — Z11.59 NEED FOR HEPATITIS C SCREENING TEST: ICD-10-CM

## 2020-11-30 PROCEDURE — 99214 OFFICE O/P EST MOD 30 MIN: CPT | Mod: TEL | Performed by: INTERNAL MEDICINE

## 2020-11-30 RX ORDER — CYCLOBENZAPRINE HCL 5 MG
10 TABLET ORAL 3 TIMES DAILY PRN
Qty: 60 TABLET | Refills: 1 | Status: SHIPPED | OUTPATIENT
Start: 2020-11-30 | End: 2022-04-29

## 2020-11-30 RX ORDER — LOSARTAN POTASSIUM 50 MG/1
TABLET ORAL
Qty: 135 TABLET | Refills: 1 | Status: SHIPPED | OUTPATIENT
Start: 2020-11-30 | End: 2022-03-28

## 2020-11-30 ASSESSMENT — ANXIETY QUESTIONNAIRES
7. FEELING AFRAID AS IF SOMETHING AWFUL MIGHT HAPPEN: NOT AT ALL
1. FEELING NERVOUS, ANXIOUS, OR ON EDGE: SEVERAL DAYS
2. NOT BEING ABLE TO STOP OR CONTROL WORRYING: SEVERAL DAYS
GAD7 TOTAL SCORE: 5
5. BEING SO RESTLESS THAT IT IS HARD TO SIT STILL: NOT AT ALL
6. BECOMING EASILY ANNOYED OR IRRITABLE: MORE THAN HALF THE DAYS
3. WORRYING TOO MUCH ABOUT DIFFERENT THINGS: SEVERAL DAYS
IF YOU CHECKED OFF ANY PROBLEMS ON THIS QUESTIONNAIRE, HOW DIFFICULT HAVE THESE PROBLEMS MADE IT FOR YOU TO DO YOUR WORK, TAKE CARE OF THINGS AT HOME, OR GET ALONG WITH OTHER PEOPLE: NOT DIFFICULT AT ALL

## 2020-11-30 ASSESSMENT — PATIENT HEALTH QUESTIONNAIRE - PHQ9
SUM OF ALL RESPONSES TO PHQ QUESTIONS 1-9: 2
5. POOR APPETITE OR OVEREATING: NOT AT ALL

## 2020-11-30 NOTE — PROGRESS NOTES
"Ilya Villagran is a 50 year old male who is being evaluated via a billable telephone visit.      The patient has been notified of following:     \"This telephone visit will be conducted via a call between you and your physician/provider. We have found that certain health care needs can be provided without the need for a physical exam.  This service lets us provide the care you need with a short phone conversation.  If a prescription is necessary we can send it directly to your pharmacy.  If lab work is needed we can place an order for that and you can then stop by our lab to have the test done at a later time.    Telephone visits are billed at different rates depending on your insurance coverage. During this emergency period, for some insurers they may be billed the same as an in-person visit.  Please reach out to your insurance provider with any questions.    If during the course of the call the physician/provider feels a telephone visit is not appropriate, you will not be charged for this service.\"    Patient has given verbal consent for Telephone visit?  Yes    What phone number would you like to be contacted at? [phone]    How would you like to obtain your AVS? Mail a copy    Subjective     Ilya Villagran is a 50 year old male who presents via phone visit today for the following health issues:    HPI     Depression and Anxiety Follow-Up    How are you doing with your depression since your last visit? No change    How are you doing with your anxiety since your last visit?  \"okay\", however, dealing with COVID in house and tested positive himself, but he is feeling okay, no symptoms.    Are you having other symptoms that might be associated with depression or anxiety? No    Have you had a significant life event? OTHER: Covid exposure, quarantining     Do you have any concerns with your use of alcohol or other drugs? No     His wife thinks maybe he should try increasing his dose from 25mg to 50mg, she is not sure " it is doing much for him since he doesn't notice if he misses a dose.      Social History     Tobacco Use     Smoking status: Never Smoker     Smokeless tobacco: Never Used   Substance Use Topics     Alcohol use: Yes     Comment: 12 pack per week     Drug use: Not Currently     Types: Marijuana     Comment: used in the past for 15 yrs     PHQ 5/8/2020   PHQ-9 Total Score 3   Q9: Thoughts of better off dead/self-harm past 2 weeks Not at all     KIKE-7 SCORE 5/8/2020   Total Score 2     Last PHQ-9 11/30/2020   1.  Little interest or pleasure in doing things 1   2.  Feeling down, depressed, or hopeless 0   3.  Trouble falling or staying asleep, or sleeping too much 0   4.  Feeling tired or having little energy 1   5.  Poor appetite or overeating 0   6.  Feeling bad about yourself 0   7.  Trouble concentrating 0   8.  Moving slowly or restless 0   Q9: Thoughts of better off dead/self-harm past 2 weeks 0   PHQ-9 Total Score 2   Difficulty at work, home, or with people Not difficult at all     KIKE-7  5/8/2020   1. Feeling nervous, anxious, or on edge 0   2. Not being able to stop or control worrying 0   3. Worrying too much about different things 0   4. Trouble relaxing 0   5. Being so restless that it is hard to sit still 0   6. Becoming easily annoyed or irritable 2   7. Feeling afraid, as if something awful might happen 0   KIKE-7 Total Score 2       Suicide Assessment Five-step Evaluation and Treatment (SAFE-T)        Diabetes Follow-up      How often are you checking your blood sugar? Not recently, it's been a couple of months but they were okay at that point    What concerns do you have today about your diabetes? None     Do you have any of these symptoms? (Select all that apply)  No numbness or tingling in feet.  No redness, sores or blisters on feet.  No complaints of excessive thirst.  No reports of blurry vision.  No significant changes to weight.    Have you had a diabetic eye exam in the last 12 months? No      Following a low carb diet        BP Readings from Last 2 Encounters:   11/23/20 (!) 160/103   02/13/20 (!) 146/92     Hemoglobin A1C (%)   Date Value   02/13/2020 6.7 (H)   09/28/2018 5.5     LDL Cholesterol Calculated (mg/dL)   Date Value   04/27/2018 127 (H)     LDL Cholesterol Direct (mg/dL)   Date Value   02/13/2020 122 (H)               Hypertension Follow-up    He has been out of his med for a couple of weeks.        Do you check your blood pressure regularly outside of the clinic? No     Are you following a low salt diet? Yes- trying to    Are your blood pressures ever more than 140 on the top number (systolic) OR more   than 90 on the bottom number (diastolic), for example 140/90? Yes- high in the ER when he was out of medication    He is no longer using Advair or albuterol- they didn't seem to be helping.  He had PFTs that were normal and allergy testing that was fine, but he continues to have constant wheezing.  He was given treatment for possible reflux.  He saw the allergies and was referred to pulmonology in 2018 but didn't complete this.      He has been having trouble in the right shoulder when he is sleeping for the past 2 weeks.  Pain is coming right out of the shoulder joint.  There is a spot over the shoulder blade that his wife found that is tender.  He had a similar problem in the past that improved with massage and a muscle relaxer.  There is pain radiating down to the finger tips, hasn't noticed any particular fingertips that are affected.  Slight numbness and tingling.  He is taking 800mg ibuprofen and that is only somewhat helpful.  I don't see any records of shoulder issues in his chart here.  He did take Flexeril for tension headaches in 2017.      Review of Systems   Constitutional, psych, CV, endo, MSK systems are negative, except as otherwise noted.       Objective          Vitals:  No vitals were obtained today due to virtual visit.    healthy, alert and no distress  PSYCH: Alert  and oriented times 3; coherent speech, normal   rate and volume, able to articulate logical thoughts, able   to abstract reason, no tangential thoughts, no hallucinations   or delusions  His affect is normal  RESP: No cough, no audible wheezing, able to talk in full sentences  Remainder of exam unable to be completed due to telephone visits          Assessment/Plan:    Assessment & Plan     Anxiety and depression    Okay control on the 25mg but his wife would like him to try a higher dose of 50mg, so we'll increase this up.  Follow-up as needed if not improving.     - sertraline (ZOLOFT) 50 MG tablet; Take 1 tablet (50 mg) by mouth daily    Type 2 diabetes mellitus without complication, without long-term current use of insulin (H)    Historically diet controlled, due for labs.  Advised him to schedule eye exam.     - Lipid panel reflex to direct LDL Fasting; Future  - **A1C FUTURE anytime; Future    Essential hypertension    BP in ER was high but he was out of med at that point.  Will resume and return for labs and RN BP check when he is off COVID isolation.     - losartan (COZAAR) 50 MG tablet; TAKE 1 AND 1/2 TABLETS BY MOUTH EVERY DAY  - **Basic metabolic panel FUTURE anytime; Future    Wheezing    Still having chronic wheezing, negative prior PFTs, inhalers did not help.  He was previously referred to pulm by the allergist and he'd like to schedule this now.     - PULMONARY MEDICINE REFERRAL    Need for hepatitis C screening test    - **Hepatitis C Screen Reflex to RNA FUTURE anytime; Future    Acute pain of right shoulder    Unclear etiology without doing exam, but we'll try some Flexeril as he found muscle relaxers to be helpful for a similar issue in the past.  Continue ibuprofen.  Follow-up in clinic for eval if not improving.     - cyclobenzaprine (FLEXERIL) 5 MG tablet; Take 2 tablets (10 mg) by mouth 3 times daily as needed for muscle spasms     He reports he had a colonoscopy at age 39, and he recently  got a call to schedule another colonoscopy.    Return in about 1 month (around 12/30/2020) for Lab appointment, BP check.    Vinh Ortega MD  [facility]          Phone call duration:  25 minutes

## 2020-12-01 ASSESSMENT — ANXIETY QUESTIONNAIRES: GAD7 TOTAL SCORE: 5

## 2021-09-24 NOTE — LETTER
"ED to Behavioral Floor Handoff    SITUATION  Tate Crocker is a 35 year old male who speaks English and lives in a home with a spouse The patient arrived in the ED by private car from emergency room with a complaint of Alcohol Problem (1 liter of vodka daily, last drink prior to coming in, denies history of seizures or DTs, uses marijuana occasionally, seeking detox)  .The patient's current symptoms started/worsened 1 month(s) ago and during this time the symptoms have increased.   In the ED, pt was diagnosed with   Final diagnoses:   Alcohol abuse        Initial vitals were: BP: (!) 145/101  Pulse: 89  Temp: 97.7  F (36.5  C)  Resp: 16  Height: 177.8 cm (5' 10\")  Weight: 77.1 kg (170 lb)  SpO2: 97 %   --------  Is the patient diabetic? No   If yes, last blood glucose? --     If yes, was this treated in the ED? --  --------  Is the patient inebriated (ETOH) No or Impaired on other substances? No  MSSA done? Yes  Last MSSA score: --    Were withdrawal symptoms treated? Yes  Does the patient have a seizure history? No. If yes, date of most recent seizure--  --------  Is the patient patient experiencing suicidal ideation? denies current or recent suicidal ideation     Homicidal ideation? denies current or recent homicidal ideation or behaviors.    Self-injurious behavior/urges? denies current or recent self injurious behavior or ideation.  ------  Was pt aggressive in the ED No  Was a code called No  Is the pt now cooperative? Yes  -------  Meds given in ED:   Medications   diazepam (VALIUM) tablet 5-20 mg (10 mg Oral Given 9/23/21 2308)   diazepam (VALIUM) tablet 5-20 mg (has no administration in time range)   atenolol (TENORMIN) tablet 50 mg (has no administration in time range)   thiamine (B-1) tablet 100 mg (has no administration in time range)   folic acid (FOLVITE) tablet 1 mg (has no administration in time range)   multivitamin w/minerals (THERA-VIT-M) tablet 1 tablet (has no administration in time range) " March 16, 2020      Ilya Villagran  [address] [address]          At this time you are due for a Colon Cancer Screening. Here is some information regarding this testing.     Recommended every 5-10 years, depending on your history, in order to prevent and detect colon cancer at its earliest stages.  Colon cancer is now the second leading cause of death in the [place] for both men and women and there are over 130,000 new cases and 50,000 deaths per year from colon cancer.  Colonoscopies can prevent 90-95% of these deaths.  Problem lesions can be removed before they ever become cancer. This test is not only looking for cancer, but also getting rid of precancerious lesions. You are usually given some sedation which makes the test very comfortable for most people.      If you do not wish to do a colonoscopy or cannot afford to do one, at this time, there is another option. It is called a FIT test or Fecal Immunochemical Occult Blood Test (take home stool sample kit).  It does not replace the colonoscopy for colorectal cancer screening, but it can detect hidden bleeding in the lower colon.  It does need to be repeated every year and if a positive result is obtained, you would be referred for a colonoscopy. The FIT test is really easy to do and does not require any  diet or medication restrictions and involves only one collection sample.      If you have completed either one of these tests or had a flexible sigmoidoscopy in the past five years at another facility, please have the records sent to our clinic so that we can best coordinate your care and update your chart.  Please call us if you have questions or would like arrange either to do a colonoscopy or obtain the necessary test kit for the Fecal Occult Test.      Sincerely,        Favian Cole MD             acetaminophen (TYLENOL) tablet 650 mg (has no administration in time range)   alum & mag hydroxide-simethicone (MAALOX) suspension 30 mL (has no administration in time range)   senna-docusate (SENOKOT-S/PERICOLACE) 8.6-50 MG per tablet 1 tablet (has no administration in time range)   traZODone (DESYREL) tablet 50 mg (has no administration in time range)   hydrOXYzine (ATARAX) tablet 25 mg (has no administration in time range)   nicotine (NICORETTE) gum 4-8 mg (has no administration in time range)   ondansetron (ZOFRAN-ODT) ODT tab 4 mg (has no administration in time range)   loperamide (IMODIUM) capsule 2 mg (has no administration in time range)   folic acid (FOLVITE) tablet 1 mg (has no administration in time range)   nicotine (NICORETTE) gum 4 mg (4 mg Buccal Given 9/23/21 1617)   nicotine (NICORETTE) gum 4 mg (4 mg Buccal Given 9/23/21 1749)   nicotine (NICORETTE) gum 4 mg (4 mg Buccal Given 9/23/21 1959)   thiamine (B-1) tablet 100 mg (100 mg Oral Given 9/23/21 2022)   multivitamin w/minerals (THERA-VIT-M) tablet 1 tablet (1 tablet Oral Given 9/23/21 2022)   nicotine (NICORETTE) gum 4 mg (4 mg Buccal Given 9/23/21 2308)      Family present during ED course? Yes  Family currently present? Yes    BACKGROUND  Does the patient have a cognitive impairment or developmental disability? No  Allergies:   Allergies   Allergen Reactions     Metal [Staples]      Rash    .   Social demographics are   Social History     Socioeconomic History     Marital status: Single     Spouse name: None     Number of children: None     Years of education: None     Highest education level: None   Occupational History     None   Tobacco Use     Smoking status: Current Every Day Smoker     Types: Vaping Device     Smokeless tobacco: Never Used   Substance and Sexual Activity     Alcohol use: No     Drug use: No     Sexual activity: Yes   Other Topics Concern     Parent/sibling w/ CABG, MI or angioplasty before 65F 55M? Not Asked   Social  History Narrative     None     Social Determinants of Health     Financial Resource Strain:      Difficulty of Paying Living Expenses:    Food Insecurity:      Worried About Running Out of Food in the Last Year:      Ran Out of Food in the Last Year:    Transportation Needs:      Lack of Transportation (Medical):      Lack of Transportation (Non-Medical):    Physical Activity:      Days of Exercise per Week:      Minutes of Exercise per Session:    Stress:      Feeling of Stress :    Social Connections:      Frequency of Communication with Friends and Family:      Frequency of Social Gatherings with Friends and Family:      Attends Mosque Services:      Active Member of Clubs or Organizations:      Attends Club or Organization Meetings:      Marital Status:    Intimate Partner Violence:      Fear of Current or Ex-Partner:      Emotionally Abused:      Physically Abused:      Sexually Abused:         ASSESSMENT  Labs results   Labs Ordered and Resulted from Time of ED Arrival Up to the Time of Departure from the ED   COMPREHENSIVE METABOLIC PANEL - Abnormal; Notable for the following components:       Result Value    Urea Nitrogen 6 (*)     Glucose 130 (*)      (*)      (*)     All other components within normal limits   ETHYL ALCOHOL LEVEL - Abnormal; Notable for the following components:    Alcohol ethyl 0.23 (*)     All other components within normal limits   CBC WITH PLATELETS AND DIFFERENTIAL - Abnormal; Notable for the following components:    RBC Count 4.26 (*)     All other components within normal limits   ALCOHOL BREATH TEST POCT - Abnormal; Notable for the following components:    Alcohol Breath Test 0.28 (*)     All other components within normal limits   DRUG ABUSE SCREEN 1 URINE (ED) - Normal   INR - Normal   TSH WITH FREE T4 REFLEX - Normal   ROUTINE UA WITH MICROSCOPIC REFLEX TO CULTURE - Normal    Narrative:     Urine Culture not indicated   COVID-19 VIRUS (CORONAVIRUS) BY PCR -  Normal    Narrative:     Testing was performed using the Xpert Xpress SARS-CoV-2 Assay on the  Cepheid Gene-Xpert Instrument Systems. Additional information about  this Emergency Use Authorization (EUA) assay can be found via the Lab  Guide. This test should be ordered for the detection of SARS-CoV-2 in  individuals who meet SARS-CoV-2 clinical and/or epidemiological  criteria. Test performance is unknown in asymptomatic patients. This  test is for in vitro diagnostic use under the FDA EUA for  laboratories certified under CLIA to perform high complexity testing.  This test has not been FDA cleared or approved. A negative result  does not rule out the presence of PCR inhibitors in the specimen or  target RNA in concentration below the limit of detection for the  assay. The possibility of a false negative should be considered if  the patient's recent exposure or clinical presentation suggests  COVID-19. This test was validated by the [facility]. This laboratory is certified under  the Clinical Laboratory Improvement Amendments of 1988 (CLIA-88) as  qualified to perform high complexity laboratory testing.     MSSA SCORE AND VS   NOTIFY   URINE DRUGS OF ABUSE SCREEN    Narrative:     The following orders were created for panel order Urine Drugs of Abuse Screen.  Procedure                               Abnormality         Status                     ---------                               -----------         ------                     Drug abuse screen 1 urin...[[id number]]  Normal              Final result                 Please view results for these tests on the individual orders.   CBC WITH PLATELETS & DIFFERENTIAL    Narrative:     The following orders were created for panel order CBC with platelets differential.  Procedure                               Abnormality         Status                     ---------                               -----------         ------     "                 CBC with platelets and d...[[id number]]  Abnormal            Final result                 Please view results for these tests on the individual orders.      Imaging Studies: No results found for this or any previous visit (from the past 24 hour(s)).   Most recent vital signs BP (!) 142/95   Pulse 90   Temp 98.4  F (36.9  C) (Temporal)   Resp 16   Ht 1.778 m (5' 10\")   Wt 77.1 kg (170 lb)   SpO2 97%   BMI 24.39 kg/m     Abnormal labs/tests/findings requiring intervention:---   Pain control: pt had none  Nausea control: pt had none    RECOMMENDATION  Are any infection precautions needed (MRSA, VRE, etc.)? No If yes, what infection? --  ---  Does the patient have mobility issues? independently. If yes, what device does the pt use? ---  ---  Is patient on 72 hour hold or commitment? No If on 72 hour hold, have hold and rights been given to patient? N/A  Are admitting orders written if after 10 p.m. ?N/A  Tasks needing to be completed:---     Marley Echavarria RN   ascom--      [phone] [facility]   [phone] [facility]  "

## 2022-02-17 DIAGNOSIS — F32.A ANXIETY AND DEPRESSION: ICD-10-CM

## 2022-02-17 DIAGNOSIS — F41.9 ANXIETY AND DEPRESSION: ICD-10-CM

## 2022-02-17 NOTE — TELEPHONE ENCOUNTER
Reason for Call:  Medication or medication refill:Zoloft 50mg  Do you use a [facility]?  Name of the pharmacy and phone number for the current request[facility]  [facility] requested: Z[facility]OFT    Other request: Wife if calling begging to have his prescription refilled just enough to make it until he see's  on March 10. She promises he will make this appointment and get caught up with everything at that time.     Can we leave a detailed message on this number? YES    Phone number patient can be reached at: Home number on file [phone] (home)    Best Time: any    Call taken on 2/17/2022 at 1:16 PM by Christin Mcdermott

## 2022-02-17 NOTE — TELEPHONE ENCOUNTER
Pending Prescriptions:                       Disp   Refills    sertraline (ZOLOFT) 50 MG tablet          90 tab*3            Sig: Take 1 tablet (50 mg) by mouth daily    Routing refill request to provider for review/approval because:  Patient needs to be seen because it has been more than 1 year since last office visit.      Pt has appt for 3/10 and needs refill to get by until then    Candelario Begum RN

## 2022-03-25 DIAGNOSIS — F32.A ANXIETY AND DEPRESSION: ICD-10-CM

## 2022-03-25 DIAGNOSIS — I10 ESSENTIAL HYPERTENSION: ICD-10-CM

## 2022-03-25 DIAGNOSIS — F41.9 ANXIETY AND DEPRESSION: ICD-10-CM

## 2022-03-28 RX ORDER — LOSARTAN POTASSIUM 50 MG/1
TABLET ORAL
Qty: 45 TABLET | Refills: 0 | Status: SHIPPED | OUTPATIENT
Start: 2022-03-28 | End: 2022-04-29

## 2022-03-28 NOTE — TELEPHONE ENCOUNTER
"Requested Prescriptions   Pending Prescriptions Disp Refills     sertraline (ZOLOFT) 50 MG tablet [Pharmacy Med Name: SERTRALINE HCL 50MG TABS] 30 tablet 0     Sig: TAKE 1 TABLET (50 MG) BY MOUTH DAILY       SSRIs Protocol Failed - 3/25/2022  9:58 AM        Failed - PHQ-9 score less than 5 in past 6 months     Please review last PHQ-9 score.           Failed - Recent (6 mo) or future (30 days) visit within the authorizing provider's specialty     Patient had office visit in the last 6 months or has a visit in the next 30 days with authorizing provider or within the authorizing provider's specialty.  See \"Patient Info\" tab in inbasket, or \"Choose Columns\" in Meds & Orders section of the refill encounter.            Passed - Medication is active on med list        Passed - Patient is age 18 or older           losartan (COZAAR) 50 MG tablet [Pharmacy Med Name: LOSARTAN POTASSIUM 50MG TABS] 135 tablet 1     Sig: TAKE 1 AND 1/2 TABLETS BY MOUTH EVERY DAY       Angiotensin-II Receptors Failed - 3/25/2022  9:58 AM        Failed - Last blood pressure under 140/90 in past 12 months     BP Readings from Last 3 Encounters:   11/23/20 (!) 160/103   02/13/20 (!) 146/92   10/22/19 (!) 174/92                 Failed - Recent (12 mo) or future (30 days) visit within the authorizing provider's specialty     Patient has had an office visit with the authorizing provider or a provider within the authorizing providers department within the previous 12 mos or has a future within next 30 days. See \"Patient Info\" tab in inbasket, or \"Choose Columns\" in Meds & Orders section of the refill encounter.              Failed - Normal serum creatinine on file in past 12 months     Recent Labs   Lab Test 02/13/20  1615   CR 0.82       Ok to refill medication if creatinine is low          Failed - Normal serum potassium on file in past 12 months     Recent Labs   Lab Test 02/13/20  1615   POTASSIUM 4.1                    Passed - Medication is active " on med list        Passed - Patient is age 18 or older

## 2022-03-28 NOTE — TELEPHONE ENCOUNTER
One refill sent with note on sig to rescheduled follow-up visit (visit on 3/10 was cancelled).    Thanks,  Vinh Ortega MD

## 2022-04-29 ENCOUNTER — OFFICE VISIT (OUTPATIENT)
Dept: FAMILY MEDICINE | Facility: CLINIC | Age: 52
End: 2022-04-29
Payer: COMMERCIAL

## 2022-04-29 VITALS
HEART RATE: 92 BPM | HEIGHT: 72 IN | SYSTOLIC BLOOD PRESSURE: 150 MMHG | WEIGHT: 244.7 LBS | BODY MASS INDEX: 33.14 KG/M2 | RESPIRATION RATE: 12 BRPM | DIASTOLIC BLOOD PRESSURE: 92 MMHG | OXYGEN SATURATION: 97 % | TEMPERATURE: 99 F

## 2022-04-29 DIAGNOSIS — F41.9 ANXIETY AND DEPRESSION: ICD-10-CM

## 2022-04-29 DIAGNOSIS — M54.2 CERVICALGIA: ICD-10-CM

## 2022-04-29 DIAGNOSIS — E11.9 TYPE 2 DIABETES MELLITUS WITHOUT COMPLICATION, WITHOUT LONG-TERM CURRENT USE OF INSULIN (H): ICD-10-CM

## 2022-04-29 DIAGNOSIS — I10 ESSENTIAL HYPERTENSION: ICD-10-CM

## 2022-04-29 DIAGNOSIS — L30.9 ECZEMA, UNSPECIFIED TYPE: ICD-10-CM

## 2022-04-29 DIAGNOSIS — F32.A ANXIETY AND DEPRESSION: ICD-10-CM

## 2022-04-29 DIAGNOSIS — R05.9 COUGH: ICD-10-CM

## 2022-04-29 DIAGNOSIS — Z00.00 ROUTINE GENERAL MEDICAL EXAMINATION AT A HEALTH CARE FACILITY: Primary | ICD-10-CM

## 2022-04-29 DIAGNOSIS — Z13.220 SCREENING FOR HYPERLIPIDEMIA: ICD-10-CM

## 2022-04-29 DIAGNOSIS — Z12.11 SCREEN FOR COLON CANCER: ICD-10-CM

## 2022-04-29 DIAGNOSIS — Z11.59 NEED FOR HEPATITIS C SCREENING TEST: ICD-10-CM

## 2022-04-29 LAB
ANION GAP SERPL CALCULATED.3IONS-SCNC: 5 MMOL/L (ref 3–14)
BUN SERPL-MCNC: 16 MG/DL (ref 7–30)
CALCIUM SERPL-MCNC: 8.7 MG/DL (ref 8.5–10.1)
CHLORIDE BLD-SCNC: 103 MMOL/L (ref 94–109)
CHOLEST SERPL-MCNC: 200 MG/DL
CO2 SERPL-SCNC: 27 MMOL/L (ref 20–32)
CREAT SERPL-MCNC: 0.75 MG/DL (ref 0.66–1.25)
CREAT UR-MCNC: 84 MG/DL
FASTING STATUS PATIENT QL REPORTED: YES
GFR SERPL CREATININE-BSD FRML MDRD: >90 ML/MIN/1.73M2
GLUCOSE BLD-MCNC: 290 MG/DL (ref 70–99)
HBA1C MFR BLD: 8.6 % (ref 0–5.6)
HDLC SERPL-MCNC: 41 MG/DL
LDLC SERPL CALC-MCNC: 108 MG/DL
MICROALBUMIN UR-MCNC: 5 MG/L
MICROALBUMIN/CREAT UR: 5.95 MG/G CR (ref 0–17)
NONHDLC SERPL-MCNC: 159 MG/DL
POTASSIUM BLD-SCNC: 4 MMOL/L (ref 3.4–5.3)
SODIUM SERPL-SCNC: 135 MMOL/L (ref 133–144)
TRIGL SERPL-MCNC: 256 MG/DL

## 2022-04-29 PROCEDURE — 82043 UR ALBUMIN QUANTITATIVE: CPT | Performed by: INTERNAL MEDICINE

## 2022-04-29 PROCEDURE — 80061 LIPID PANEL: CPT | Performed by: INTERNAL MEDICINE

## 2022-04-29 PROCEDURE — 86803 HEPATITIS C AB TEST: CPT | Performed by: INTERNAL MEDICINE

## 2022-04-29 PROCEDURE — 80048 BASIC METABOLIC PNL TOTAL CA: CPT | Performed by: INTERNAL MEDICINE

## 2022-04-29 PROCEDURE — 83036 HEMOGLOBIN GLYCOSYLATED A1C: CPT | Performed by: INTERNAL MEDICINE

## 2022-04-29 PROCEDURE — 99396 PREV VISIT EST AGE 40-64: CPT | Performed by: INTERNAL MEDICINE

## 2022-04-29 PROCEDURE — 99214 OFFICE O/P EST MOD 30 MIN: CPT | Mod: 25 | Performed by: INTERNAL MEDICINE

## 2022-04-29 PROCEDURE — 36415 COLL VENOUS BLD VENIPUNCTURE: CPT | Performed by: INTERNAL MEDICINE

## 2022-04-29 RX ORDER — OMEPRAZOLE 40 MG/1
40 CAPSULE, DELAYED RELEASE ORAL DAILY
Qty: 30 CAPSULE | Refills: 0 | Status: SHIPPED | OUTPATIENT
Start: 2022-04-29 | End: 2022-04-29

## 2022-04-29 RX ORDER — CYCLOBENZAPRINE HCL 5 MG
5 TABLET ORAL 3 TIMES DAILY PRN
Qty: 30 TABLET | Refills: 0 | Status: ON HOLD | OUTPATIENT
Start: 2022-04-29 | End: 2022-11-21

## 2022-04-29 RX ORDER — LOSARTAN POTASSIUM 100 MG/1
100 TABLET ORAL DAILY
Qty: 90 TABLET | Refills: 3 | Status: SHIPPED | OUTPATIENT
Start: 2022-04-29 | End: 2024-02-06

## 2022-04-29 ASSESSMENT — ENCOUNTER SYMPTOMS
HEMATOCHEZIA: 0
ARTHRALGIAS: 1
CONSTIPATION: 0
ABDOMINAL PAIN: 0
DIARRHEA: 0
NERVOUS/ANXIOUS: 0
CHILLS: 0
SHORTNESS OF BREATH: 1
FREQUENCY: 0
DIZZINESS: 0
JOINT SWELLING: 0
HEARTBURN: 0
NAUSEA: 0
PARESTHESIAS: 0
HEMATURIA: 0
PALPITATIONS: 0
SORE THROAT: 0
WEAKNESS: 0
FEVER: 0
MYALGIAS: 1
EYE PAIN: 0
COUGH: 1
DYSURIA: 0
HEADACHES: 1

## 2022-04-29 ASSESSMENT — PATIENT HEALTH QUESTIONNAIRE - PHQ9: SUM OF ALL RESPONSES TO PHQ QUESTIONS 1-9: 2

## 2022-04-29 ASSESSMENT — PAIN SCALES - GENERAL: PAINLEVEL: SEVERE PAIN (7)

## 2022-04-29 NOTE — PATIENT INSTRUCTIONS
Diabetes/ hypertension   1. Keep working on diet and exercise - [employer], [employer]  2. Blood work today  3. Refills sent  4. If cholesterol is still high, may need to consider cholesterol lowering medication    Right Shoulder/neck Pain:  1. Refill of Flexeril; call if want to start physical therapy   2. Follow-up if not improving.    Health Care Maintenance:  1. You are due for dilated eye exam.  Have the report sent to Dr. Pepe's office.  2. You are due for a colonoscopy.  Please call [phone] to schedule    Wheeze/Cough:  1. Lungs are clear; can be from reflux; recommend omeprazole 40 mg daily x 1 month; if symptoms persist, follow-up in clinic for further discussion           Tips for a Healthy Diet    1. Add more fresh fruits and vegetables to your diet.  The more colorful with the fruit or vegetable (think berries, spinach, carrots, peppers) the healthier it tends to be.  Juice is not a fruit.  Prepare the vegetables in a healthy way - steam, bake.  Avoid breading, butter/oil to cook.  Use herbs and spices instead of salt to season food.  2. Add more fiber to your diet.  Swap out white bread, white rice, white pasta for whole grain versions.  Reduce the portion size and frequency of carbohydrates/starches.  Look for foods that have a lower glycemic index    https://www.health.[employer].edu/healthbeat/a-good-guide-to-good-carbs-the-glycemic-index    3. Choose healthier fats such as nuts, olive oil, avocado, etc. Stay away from lard, butter.  4. Decrease the frequency and portion size of 'junk food' -pizza, candy, cookies, potato chips, etc.  5. Watch liquid calories such as coffee drinks, juice, soda, teas.  There tends to be excessive sugar in these beverages.  6. Increase protein in your diet.  Eggs, cheese, yogurt, nuts, lentils, chicken, fish are good healthy choices.  Protein keeps you albarran longer, and you are less likely to have blood sugar spikes  7. Eat healthy at least 80% of the time.   It is ok for a special treat every once in a while, just not every day.  When are you going to indulge (think [employer] time), be sure you are eating extra healthy the day before and after.      Resources:  1. [employer] Resources for Health and Wellness:https://[email or web address]/dig-yes-foods    2.   [employer] Ways To Wellness:    https://www.[employer].org/services/ways-to-wellness  Ways to Wellness offers:    Nutrition and weight management     Corrective exercise and fitness training     Lifestyle and behavioral change     Healing services  Our team includes registered dietitians, certified personal trainers, life coaches, as well as a Culinary Educator.  3. Book - Atomic Habits by John Dover.  This a good book that looks into our habits and how to sustain good healthy habits and get rid of bad habits.    Preventive Health Recommendations  Male Ages 50 - 64    Yearly exam:             See your health care provider every year in order to  o   Review health changes.   o   Discuss preventive care.    o   Review your medicines if your doctor has prescribed any.     Have a cholesterol test every 5 years, or more frequently if you are at risk for high cholesterol/heart disease.     Have a diabetes test (fasting glucose) every three years. If you are at risk for diabetes, you should have this test more often.     Have a colonoscopy at age 50, or have a yearly FIT test (stool test). These exams will check for colon cancer.      Talk with your health care provider about whether or not a prostate cancer screening test (PSA) is right for you.    You should be tested each year for STDs (sexually transmitted diseases), if you re at risk.     Shots: Get a flu shot each year. Get a tetanus shot every 10 years.     Nutrition:    Eat at least 5 servings of fruits and vegetables daily.     Eat whole-grain bread, whole-wheat pasta and brown rice instead of white grains and rice.     Get adequate Calcium and Vitamin D.      Lifestyle    Exercise for at least 150 minutes a week (30 minutes a day, 5 days a week). This will help you control your weight and prevent disease.     Limit alcohol to one drink per day.     No smoking.     Wear sunscreen to prevent skin cancer.     See your dentist every six months for an exam and cleaning.     See your eye doctor every 1 to 2 years.

## 2022-04-29 NOTE — LETTER
May 2, 2022      Ilya Villagran  [address] [address]        Dear ,    We are writing to inform you of your test results.    The A1c is significantly elevated at 8.6.  This is the threshold where medication is recommended.  However, during the visit you stated strong preference is to avoid medication.  Recommend to start checking your blood sugar once daily (sometimes fasting, sometimes 2 hours after lunch or dinner) and to see the diabetes educator as we discussed at the time of the visit.  Make follow-up appointment with diabetes educator Eleni Theodore [phone]     We should recheck diabetes in 3 months, with lab prior to visit       Resulted Orders   HEMOGLOBIN A1C   Result Value Ref Range    Hemoglobin A1C 8.6 (H) 0.0 - 5.6 %      Comment:      Normal <5.7%   Prediabetes 5.7-6.4%    Diabetes 6.5% or higher     Note: Adopted from ADA consensus guidelines.       If you have any questions or concerns, please call the clinic at the number listed above.       Sincerely,      Carin Pepe, DO

## 2022-04-29 NOTE — LETTER
May 3, 2022      Ilya Villagran  [address] [address] [address]        Dear ,    We are writing to inform you of your test results.    Routine screening for hepatitis C is negative.  The urine is negative for protein which is good.  Kidney function and electrolytes are normal.     The blood sugar is significantly elevated.  The cholesterol is also elevated.  We will recheck cholesterol and blood sugar at next visit in 3 months.  If they remain elevated, we should start a cholesterol-lowering medication, and a medicine for diabetes.       Resulted Orders   Hepatitis C Screen Reflex to HCV RNA Quant and Genotype   Result Value Ref Range    Hepatitis C Antibody Nonreactive Nonreactive    Narrative    Assay performance characteristics have not been established for newborns, infants, and children.   Lipid panel reflex to direct LDL Fasting   Result Value Ref Range    Cholesterol 200 (H) <200 mg/dL    Triglycerides 256 (H) <150 mg/dL    Direct Measure HDL 41 >=40 mg/dL    LDL Cholesterol Calculated 108 (H) <=100 mg/dL    Non HDL Cholesterol 159 (H) <130 mg/dL    Patient Fasting > 8hrs? Yes     Narrative    Cholesterol  Desirable:  <200 mg/dL    Triglycerides  Normal:  Less than 150 mg/dL  Borderline High:  150-199 mg/dL  High:  200-499 mg/dL  Very High:  Greater than or equal to 500 mg/dL    Direct Measure HDL  Female:  Greater than or equal to 50 mg/dL   Male:  Greater than or equal to 40 mg/dL    LDL Cholesterol  Desirable:  <100mg/dL  Above Desirable:  100-129 mg/dL   Borderline High:  130-159 mg/dL   High:  160-189 mg/dL   Very High:  >= 190 mg/dL    Non HDL Cholesterol  Desirable:  130 mg/dL  Above Desirable:  130-159 mg/dL  Borderline High:  160-189 mg/dL  High:  190-219 mg/dL  Very High:  Greater than or equal to 220 mg/dL   HEMOGLOBIN A1C   Result Value Ref Range    Hemoglobin A1C 8.6 (H) 0.0 - 5.6 %      Comment:      Normal <5.7%   Prediabetes 5.7-6.4%    Diabetes 6.5% or higher     Note:  Adopted from ADA consensus guidelines.   BASIC METABOLIC PANEL   Result Value Ref Range    Sodium 135 133 - 144 mmol/L    Potassium 4.0 3.4 - 5.3 mmol/L    Chloride 103 94 - 109 mmol/L    Carbon Dioxide (CO2) 27 20 - 32 mmol/L    Anion Gap 5 3 - 14 mmol/L    Urea Nitrogen 16 7 - 30 mg/dL    Creatinine 0.75 0.66 - 1.25 mg/dL    Calcium 8.7 8.5 - 10.1 mg/dL    Glucose 290 (H) 70 - 99 mg/dL    GFR Estimate >90 >60 mL/min/1.73m2      Comment:      Effective December 21, 2021 eGFRcr in adults is calculated using the 2021 CKD-EPI creatinine equation which includes age and gender (Radha et al., NEJM, DOI: 10.1056/EJEEbp7551505)   Albumin Random Urine Quantitative with Creat Ratio   Result Value Ref Range    Creatinine Urine mg/dL 84 mg/dL    Albumin Urine mg/L 5 mg/L    Albumin Urine mg/g Cr 5.95 0.00 - 17.00 mg/g Cr       If you have any questions or concerns, please call the clinic at the number listed above.       Sincerely,      Carin Pepe, DO

## 2022-04-29 NOTE — PROGRESS NOTES
Chief Complaint   Patient presents with     Physical     Wheezing all the time , pain in shoulder right, Rash all over the body, Hip right sore sit for to long then it hurt 7/10 when sitting will shoot pain down leg,      Health Maintenance     Don't want immunizations, also know he is due for colon screening, meds pended           CC: Ilya Villagran is an 51 year old male who presents for preventative health visit.       Patient has been advised of split billing requirements and indicates understanding: Yes  Healthy Habits:     Getting at least 3 servings of Calcium per day:  Yes    Bi-annual eye exam:  NO    Dental care twice a year:  NO    Sleep apnea or symptoms of sleep apnea:  None    Diet:  Carbohydrate counting    Frequency of exercise:  None    Taking medications regularly:  Yes    Medication side effects:  None    PHQ-2 Total Score: 0    Additional concerns today:  No          Hypertension Follow-up    Do you check your blood pressure regularly outside of the clinic? No   Are you following a low salt diet? Yes  Are your blood pressures ever more than 140 on the top number (systolic) OR more   than 90 on the bottom number (diastolic), for example 140/90? No    Diabetes:  --not check blood sugar   --not following any specific diet    Right shoulder pain:  --flexeril has helped in the past    Depression and Anxiety Follow-Up  How are you doing with your depression since your last visit? No change  How are you doing with your anxiety since your last visit?  No change  Are you having other symptoms that might be associated with depression or anxiety? No  Have you had a significant life event? No   Do you have any concerns with your use of alcohol or other drugs? No    Social History     Tobacco Use     Smoking status: Never Smoker     Smokeless tobacco: Never Used   Vaping Use     Vaping Use: Never used   Substance Use Topics     Alcohol use: Yes     Comment: 12 pack per week     Drug use: Not Currently      Comment: used in the past for 15 yrs     PHQ 5/8/2020 11/30/2020   PHQ-9 Total Score 3 2   Q9: Thoughts of better off dead/self-harm past 2 weeks Not at all Not at all     KIKE-7 SCORE 5/8/2020 11/30/2020   Total Score 2 5     Last PHQ-9 11/30/2020   1.  Little interest or pleasure in doing things 1   2.  Feeling down, depressed, or hopeless 0   3.  Trouble falling or staying asleep, or sleeping too much 0   4.  Feeling tired or having little energy 1   5.  Poor appetite or overeating 0   6.  Feeling bad about yourself 0   7.  Trouble concentrating 0   8.  Moving slowly or restless 0   Q9: Thoughts of better off dead/self-harm past 2 weeks 0   PHQ-9 Total Score 2   Difficulty at work, home, or with people Not difficult at all     KIKE-7  11/30/2020   1. Feeling nervous, anxious, or on edge 1   2. Not being able to stop or control worrying 1   3. Worrying too much about different things 1   4. Trouble relaxing 0   5. Being so restless that it is hard to sit still 0   6. Becoming easily annoyed or irritable 2   7. Feeling afraid, as if something awful might happen 0   KIKE-7 Total Score 5   If you checked any problems, how difficult have they made it for you to do your work, take care of things at home, or get along with other people? Not difficult at all       Suicide Assessment Five-step Evaluation and Treatment (SAFE-T)    Pain History:  When did you first notice your pain? - Acute Pain   Have you seen anyone else for your pain? No  Where in your body do you have pain? Musculoskeletal problem/pain  Onset/Duration: 2017  Description  Location: Shoulder - right  Joint Swelling: no  Redness: no  Pain: no  Warmth: no  Intensity:  severe  Progression of Symptoms:  same  Accompanying signs and symptoms:   Fevers: no  Numbness/tingling/weakness: YES will go down the legs  History  Trauma to the area: no  Recent illness:  no  Previous similar problem: YES  Previous evaluation:  no  Precipitating or alleviating  factors:  Aggravating factors include: overuse  Therapies tried and outcome: flexeril         Today's PHQ-2 Score:   PHQ-2 ( 1999 Pfizer) 4/29/2022   Q1: Little interest or pleasure in doing things 0   Q2: Feeling down, depressed or hopeless 0   PHQ-2 Score 0   Q1: Little interest or pleasure in doing things Not at all   Q2: Feeling down, depressed or hopeless Not at all   PHQ-2 Score 0       Abuse: Current or Past(Physical, Sexual or Emotional)- No  Do you feel safe in your environment? Yes    Have you ever done Advance Care Planning? (For example, a Health Directive, POLST, or a discussion with a medical provider or your loved ones about your wishes): No, advance care planning information given to patient to review.  Advanced care planning was discussed at today's visit.    Social History     Tobacco Use     Smoking status: Never Smoker     Smokeless tobacco: Never Used   Substance Use Topics     Alcohol use: Yes     Comment: 12 pack per week     If you drink alcohol do you typically have >3 drinks per day or >7 drinks per week? Yes      Alcohol Use 4/29/2022   Prescreen: >3 drinks/day or >7 drinks/week? Yes   Prescreen: >3 drinks/day or >7 drinks/week? -   AUDIT SCORE  6     AUDIT - Alcohol Use Disorders Identification Test - Reproduced from the World Health Organization Audit 2001 (Second Edition) 4/29/2022   1.  How often do you have a drink containing alcohol? 4 or more times a week   2.  How many drinks containing alcohol do you have on a typical day when you are drinking? 3 or 4   3.  How often do you have five or more drinks on one occasion? Less than monthly   4.  How often during the last year have you found that you were not able to stop drinking once you had started? Never   5.  How often during the last year have you failed to do what was normally expected of you because of drinking? Never   6.  How often during the last year have you needed a first drink in the morning to get yourself going after a  heavy drinking session? Never   7.  How often during the last year have you had a feeling of guilt or remorse after drinking? Never   8.  How often during the last year have you been unable to remember what happened the night before because of your drinking? Never   9.  Have you or someone else been injured because of your drinking? No   10. Has a relative, friend, doctor or other health care worker been concerned about your drinking or suggested you cut down? No   TOTAL SCORE 6       Last PSA: No results found for: PSA    Reviewed orders with patient. Reviewed health maintenance and updated orders accordingly - Yes  Current Outpatient Medications   Medication Sig Dispense Refill     acetaminophen (TYLENOL) 500 MG tablet Take 1-2 tablets (500-1,000 mg) by mouth every 6 hours as needed for mild pain       blood glucose (NO BRAND SPECIFIED) lancets standard Use to test blood sugar 1 times daily or as directed. 100 each 11     blood glucose monitoring (NO BRAND SPECIFIED) meter device kit Use to test blood sugar 1 times daily or as directed. 1 kit 0     blood glucose monitoring (NO BRAND SPECIFIED) test strip Use to test blood sugars 1 times daily or as directed 100 strip 3     cyclobenzaprine (FLEXERIL) 5 MG tablet Take 1 tablet (5 mg) by mouth 3 times daily as needed for muscle spasms 30 tablet 0     losartan (COZAAR) 100 MG tablet Take 1 tablet (100 mg) by mouth daily 90 tablet 3     sertraline (ZOLOFT) 50 MG tablet Take 1 tablet (50 mg) by mouth daily 90 tablet 3       Reviewed and updated as needed this visit by clinical staff   Tobacco  Allergies  Meds   Med Hx  Surg Hx  Fam Hx  Soc Hx          Reviewed and updated as needed this visit by Provider                       Review of Systems   Constitutional: Negative for chills and fever.   HENT: Negative for congestion, ear pain, hearing loss and sore throat.    Eyes: Positive for visual disturbance. Negative for pain.   Respiratory: Positive for cough and  "shortness of breath.    Cardiovascular: Negative for chest pain, palpitations and peripheral edema.   Gastrointestinal: Negative for abdominal pain, constipation, diarrhea, heartburn, hematochezia and nausea.   Genitourinary: Negative for dysuria, frequency, genital sores, hematuria, impotence, penile discharge and urgency.   Musculoskeletal: Positive for arthralgias and myalgias. Negative for joint swelling.   Skin: Positive for rash.   Neurological: Positive for headaches. Negative for dizziness, weakness and paresthesias.   Psychiatric/Behavioral: Negative for mood changes. The patient is not nervous/anxious.          OBJECTIVE:   BP (!) 150/92 (BP Location: Right arm, Patient Position: Sitting, Cuff Size: Adult Large)   Pulse 92   Temp 99  F (37.2  C)   Resp 12   Ht 1.83 m (6' 0.05\")   Wt 111 kg (244 lb 11.2 oz)   SpO2 97%   BMI 33.14 kg/m      Physical Exam  GENERAL: healthy, alert and no distress  EYES: Eyes grossly normal to inspection, PERRL and conjunctivae and sclerae normal  HENT: ear canals and TM's normal, nose and mouth without ulcers or lesions  NECK: no adenopathy, no asymmetry, masses, or scars and thyroid normal to palpation  RESP: lungs clear to auscultation - no rales, rhonchi or wheezes  CV: regular rate and rhythm, normal S1 S2, no S3 or S4, no murmur, click or rub, no peripheral edema and peripheral pulses strong  ABDOMEN: soft, nontender, no hepatosplenomegaly, no masses and bowel sounds normal  MS: no gross musculoskeletal defects noted, no edema  SKIN: no suspicious lesions or rashes  NEURO: Normal strength and tone, mentation intact and speech normal  PSYCH: mentation appears normal, affect normal/bright  Diabetic foot exam: normal DP and PT pulses, no trophic changes or ulcerative lesions and normal sensory exam    Diagnostic Test Results:  Labs reviewed in Epic  Results for orders placed or performed in visit on 04/29/22 (from the past 24 hour(s))   HEMOGLOBIN A1C   Result Value " Ref Range    Hemoglobin A1C 8.6 (H) 0.0 - 5.6 %       ASSESSMENT/PLAN:   (Z00.00) Routine general medical examination at a health care facility  (primary encounter diagnosis)  Comment: Patient has not seen me in 4 years  Plan:     (E11.9) Type 2 diabetes mellitus without complication, without long-term current use of insulin (H)  Comment: After the visit the A1c returned significantly elevated.  Patient stated strong preference is during the visit to avoid medication.  We talked about diet and exercise at length.  I recommended he see the diabetes educator.  Recommend they check his blood sugar once daily, sometimes fasting, sometimes 2 hours after meal.  We should recheck the A1c in 3 months.  If it is still above 7, metformin will be started.  We discussed a statin and he declined at this time  Plan: OPTOMETRY REFERRAL, HEMOGLOBIN A1C, BASIC         METABOLIC PANEL, Albumin Random Urine         Quantitative with Creat Ratio, AMB Adult         Diabetes Educator Referral            (F41.9,  F32.A) Anxiety and depression  Comment:  - stable, refill provided  Plan: sertraline (ZOLOFT) 50 MG tablet            (I10) Essential hypertension  Comment: Uncontrolled.  Increase losartan to 100 mg  Plan: losartan (COZAAR) 100 MG tablet            (M54.2) Cervicalgia  Comment: Not discussed at length due to discussing diabetes and blood pressure.  Short refill of the Flexeril.  Follow-up if symptoms are persisting  Plan: cyclobenzaprine (FLEXERIL) 5 MG tablet            (R05.9) Cough  Comment: Not discussed at length due to other issues reviewed.  I initially ordered the omeprazole, but patient states he has tried this in the past.  Advised to follow-up given multiple other issues discussed  Plan: DISCONTINUED: omeprazole (PRILOSEC) 40 MG DR         capsule            (L30.9) Eczema, unspecified type  Comment: Advised to see dermatology  Plan: Adult Dermatology Referral            (Z11.59) Need for hepatitis C screening  "test  Comment:   Plan: Hepatitis C Screen Reflex to HCV RNA Quant and         Genotype            (Z13.220) Screening for hyperlipidemia  Comment:   Plan: Lipid panel reflex to direct LDL Fasting            (Z12.11) Screen for colon cancer  Comment: recommend scope  Plan: Adult Gastro Ref - Procedure Only              Patient has been advised of split billing requirements and indicates understanding: Yes    COUNSELING:   Reviewed preventive health counseling, as reflected in patient instructions    Estimated body mass index is 33.14 kg/m  as calculated from the following:    Height as of this encounter: 1.83 m (6' 0.05\").    Weight as of this encounter: 111 kg (244 lb 11.2 oz).     Weight management plan: Discussed healthy diet and exercise guidelines    He reports that he has never smoked. He has never used smokeless tobacco.      Counseling Resources:  ATP IV Guidelines  Pooled Cohorts Equation Calculator  FRAX Risk Assessment  ICSI Preventive Guidelines  Dietary Guidelines for Americans, 2010  USDA's MyPlate  ASA Prophylaxis  Lung CA Screening    Carin Pepe DO  [facility]  "

## 2022-04-29 NOTE — RESULT ENCOUNTER NOTE
The A1c is significantly elevated at 8.6.  This is the threshold where medication is recommended.  However, during the visit you stated strong preference is to avoid medication.  Recommend to start checking your blood sugar once daily (sometimes fasting, sometimes 2 hours after lunch or dinner) and to see the diabetes educator as we discussed at the time of the visit.  Make follow-up appointment with diabetes educator Eleni Theodore [phone]    We should recheck diabetes in 3 months, with lab prior to visit      Other labs still in process

## 2022-05-02 ENCOUNTER — TELEPHONE (OUTPATIENT)
Dept: FAMILY MEDICINE | Facility: CLINIC | Age: 52
End: 2022-05-02
Payer: COMMERCIAL

## 2022-05-02 LAB — HCV AB SERPL QL IA: NONREACTIVE

## 2022-05-02 NOTE — TELEPHONE ENCOUNTER
Diabetes Education Scheduling Outreach #1:    Call to patient to schedule. Left message with phone number to call to schedule.    Plan for 2nd outreach attempt within 1 week.    Josee Justice  [place] [facility]  Diabetes and Nutrition Scheduling

## 2022-05-03 DIAGNOSIS — E11.9 TYPE 2 DIABETES MELLITUS WITHOUT COMPLICATION, WITHOUT LONG-TERM CURRENT USE OF INSULIN (H): Primary | ICD-10-CM

## 2022-05-03 NOTE — RESULT ENCOUNTER NOTE
Routine screening for hepatitis C is negative.  The urine is negative for protein which is good.  Kidney function and electrolytes are normal.    The blood sugar is significantly elevated.  The cholesterol is also elevated.  We will recheck cholesterol and blood sugar at next visit in 3 months.  If they remain elevated, we should start a cholesterol-lowering medication, and a medicine for diabetes.

## 2022-05-09 NOTE — TELEPHONE ENCOUNTER
Diabetes Education Scheduling Outreach #2:    Call to patient to schedule. Left message with phone number to call to schedule.    Josee Justice  [place] [facility]  Diabetes and Nutrition Scheduling

## 2022-05-31 ENCOUNTER — TELEPHONE (OUTPATIENT)
Dept: FAMILY MEDICINE | Facility: CLINIC | Age: 52
End: 2022-05-31
Payer: COMMERCIAL

## 2022-05-31 NOTE — TELEPHONE ENCOUNTER
Patient Quality Outreach    Patient is due for the following:   Hypertension -  Hypertension follow-up visit  Colon Cancer Screening -  Colonoscopy    NEXT STEPS:   Patient has upcoming appointment, these items will be addressed at that time.    Type of outreach:    Chart review performed, no outreach needed.      Questions for provider review:    None     Estela Sinha MA

## 2022-06-03 ENCOUNTER — OFFICE VISIT (OUTPATIENT)
Dept: FAMILY MEDICINE | Facility: CLINIC | Age: 52
End: 2022-06-03
Payer: COMMERCIAL

## 2022-06-03 VITALS
TEMPERATURE: 98.6 F | DIASTOLIC BLOOD PRESSURE: 92 MMHG | OXYGEN SATURATION: 97 % | SYSTOLIC BLOOD PRESSURE: 134 MMHG | RESPIRATION RATE: 20 BRPM | HEART RATE: 102 BPM | BODY MASS INDEX: 32.63 KG/M2 | WEIGHT: 240.9 LBS

## 2022-06-03 DIAGNOSIS — M25.512 CHRONIC LEFT SHOULDER PAIN: ICD-10-CM

## 2022-06-03 DIAGNOSIS — M54.41 CHRONIC RIGHT-SIDED LOW BACK PAIN WITH RIGHT-SIDED SCIATICA: ICD-10-CM

## 2022-06-03 DIAGNOSIS — Z12.11 SCREEN FOR COLON CANCER: ICD-10-CM

## 2022-06-03 DIAGNOSIS — G89.29 CHRONIC LEFT SHOULDER PAIN: ICD-10-CM

## 2022-06-03 DIAGNOSIS — G89.29 CHRONIC RIGHT-SIDED LOW BACK PAIN WITH RIGHT-SIDED SCIATICA: ICD-10-CM

## 2022-06-03 DIAGNOSIS — E11.9 TYPE 2 DIABETES MELLITUS WITHOUT COMPLICATION, WITHOUT LONG-TERM CURRENT USE OF INSULIN (H): Primary | ICD-10-CM

## 2022-06-03 PROBLEM — E66.01 MORBID OBESITY DUE TO EXCESS CALORIES (H): Status: RESOLVED | Noted: 2017-07-20 | Resolved: 2022-06-03

## 2022-06-03 PROCEDURE — 99214 OFFICE O/P EST MOD 30 MIN: CPT | Performed by: INTERNAL MEDICINE

## 2022-06-03 ASSESSMENT — PAIN SCALES - GENERAL: PAINLEVEL: MILD PAIN (2)

## 2022-06-03 NOTE — PROGRESS NOTES
Assessment & Plan     Type 2 diabetes mellitus without complication, without long-term current use of insulin (H) - discussed checking blood sugar; patient has glucometer and supplies; work on diet/exercise;  Declines referral to nutrition;  Recheck in 3 months;  Would add metformin if blood sugar is still high    Chronic right-sided low back pain with right-sided sciatica - sounds like sciatica and not Hip OA; recommend physical therapy   - XR Pelvis 1/2 Views; Future  - Physical Therapy Referral; Future    Chronic left shoulder pain - chronic tendonitis/RTC impingement.  physical therapy recommended  - XR Shoulder Left 2 Views; Future  - Physical Therapy Referral; Future    Screen for colon cancer - recommend scope               Patient Instructions   Health Care Maintenance:  1. You are due for a colonoscopy.  Please call [phone] to schedule    Shoulder pain:  1. Xray today  2. Likely chronic rotator cuff tendonitis causing impingement  3. Physical therapy is the best treatment     Hip Pain:  1. Xray today  2. Sounds like sciatica and not hip arthritis  3. Best treatments are muscle relaxer, physical therapy, stretching  4. May consider steroid shot but blood sugar would have to be better controlled  1. Try topicals such as Voltaren, Aspercream with Lidocaine, Capsaicin, Icy Hot, Biofreeze, Salon Pas   2. Heat or ice  3. Tylenol 500-1000 mg up to 3 x day;  Ibuprofen 400-600 mg up to 3 x day - can raise blood pressure  4. Use the muscle relaxer    Diabetes:  1. Recommend follow-up in July/Aug - schedule appointment now  2. Get blood work prior to visit (even 30 min prior)  3. Recommend to start checking blood sugar once daily.  Sometimes fasting, sometimes 2 hours after a meal  4. Consider referral to Nutritionist      Tips for a Healthy Diet    1. Add more fresh fruits and vegetables to your diet.  The more colorful with the fruit or vegetable (think berries, spinach, carrots, peppers) the healthier it  tends to be.  Juice is not a fruit.  Prepare the vegetables in a healthy way - steam, bake.  Avoid breading, butter/oil to cook.  Use herbs and spices instead of salt to season food.  2. Add more fiber to your diet.  Swap out white bread, white rice, white pasta for whole grain versions.  Reduce the portion size and frequency of carbohydrates/starches.  Look for foods that have a lower glycemic index    https://www.health.[place].edu/healthbeat/a-good-guide-to-good-carbs-the-glycemic-index    3. Choose healthier fats such as nuts, olive oil, avocado, etc. Stay away from lard, butter.  4. Decrease the frequency and portion size of 'junk food' -pizza, candy, cookies, potato chips, etc.  5. Watch liquid calories such as coffee drinks, juice, soda, teas.  There tends to be excessive sugar in these beverages.  6. Increase protein in your diet.  Eggs, cheese, yogurt, nuts, lentils, chicken, fish are good healthy choices.  Protein keeps you albarran longer, and you are less likely to have blood sugar spikes  7. Eat healthy at least 80% of the time.  It is ok for a special treat every once in a while, just not every day.  When are you going to indulge (think [place] time), be sure you are eating extra healthy the day before and after.      Resources:  1. [employer] Resources for Health and Wellness:https://www.takingcharge.[facility].edu/dig-yes-foods    2.   [place] Ways To Wellness:    https://www.[facility].org/services/ways-to-wellness  Ways to Wellness offers:    Nutrition and weight management     Corrective exercise and fitness training     Lifestyle and behavioral change     Healing services  Our team includes registered dietitians, certified personal trainers, life coaches, as well as a Culinary Educator.  3. Book - Atomic Habits by John Dover.  This a good book that looks into our habits and how to sustain good healthy habits and get rid of bad habits.      Exercise Resources:    For Seniors or Those with  Pain/Mobility Issues:  1. Silver Sneakers  https://tools.Fourteen IP.nextsocial/  Anytime Fitness, Snap Fitness, [facility], [facility]  2. Essentrics Stretch (airs on PBS)  https://essentrics.com/  3. Your Juniper - small group classes (in person or online) that help you stay active. Free or low cost  Yourjuniper.[employer]  4. Yoga for Seniors - free, online  https://www.Demandforce/content/yoga-seniors    Other Exercise Apps/Websites  1. [employer] Training Club  2. [employer] Membership  3. Fitness  - free, You Tube channel            No follow-ups on file.    Carin Pepe [facility]    Vern Caraballo is a 51 year old who presents for the following health issues  accompanied by his self.    HPI       Chief Complaint   Patient presents with     Musculoskeletal Problem     Shoulder Pain     Health Maintenance     Dont want shots        Pain History:  When did you first notice your pain? - Acute Pain   Have you seen anyone else for your pain? No  Where in your body do you have pain? Musculoskeletal problem/pain  Onset/Duration: 5 yrs but worsening  Description  Location: Hip - right  Joint Swelling: no  Redness: no  Pain: YES  Warmth: no  Intensity:  10/10  Progression of Symptoms:  same  Accompanying signs and symptoms:   Fevers: no  Numbness/tingling/weakness: YES- when sitting for to long or driving get shooting pain   History  Trauma to the area: no  Recent illness:  no  Previous similar problem: no  Previous evaluation:  no  Precipitating or alleviating factors:  Aggravating factors include: sitting and driving  Therapies tried and outcome: Ibuprofen - helpful  --there is a lot of pain and stiffness with driving, and then standing to walking  --there is shooting pain down the whole leg; sometimes there is 'jolting pain'  --traded in his car to a truck so it was easier to get in/out of the car  --no imaging of hip or low back on file  --there is occ  numbness/tingling in the leg with prolonged sitting (30 min)  --a friend pushed on a certain area of his low back and the pain totally resolved for a few min  --no new bowel/bladder incontinence; no leg weakness      Pain History:  When did you first notice your pain? - Acute Pain   Have you seen anyone else for your pain? No  Where in your body do you have pain? Musculoskeletal problem/pain  Onset/Duration: 6 m-1 year  Description  Location: shoulder - left  Joint Swelling: no  Redness: no  Pain: YES  Warmth: no  Intensity:  7/10  Progression of Symptoms:  same  Accompanying signs and symptoms:   Fevers: no  Numbness/tingling/weakness: no ROM sometimes  History  Trauma to the area: no  Recent illness:  no  Previous similar problem: no  Previous evaluation:  no  Precipitating or alleviating factors:  Aggravating factors include: overuse, sleeping on it wrong  Therapies tried and outcome: rest, ibuprofen  --works in machine shop  --this week is on vacation and the pain has improved  --sometimes there is a pinch or catch causing severe pain  --feels grinding in the shoulder  --no specific injury  --ROM is limited by pain  --there is pain with sleeping on that side at night due to pain  --insurance does not cover physical therapy     Diabetes:  --last visit in April A1c was very high; he felt strongly he did not want to start medication     Current Outpatient Medications   Medication Sig Dispense Refill     acetaminophen (TYLENOL) 500 MG tablet Take 1-2 tablets (500-1,000 mg) by mouth every 6 hours as needed for mild pain       blood glucose (NO BRAND SPECIFIED) lancets standard Use to test blood sugar 1 times daily or as directed. 100 each 11     blood glucose monitoring (NO BRAND SPECIFIED) meter device kit Use to test blood sugar 1 times daily or as directed. 1 kit 0     blood glucose monitoring (NO BRAND SPECIFIED) test strip Use to test blood sugars 1 times daily or as directed 100 strip 3     cyclobenzaprine  (FLEXERIL) 5 MG tablet Take 1 tablet (5 mg) by mouth 3 times daily as needed for muscle spasms 30 tablet 0     losartan (COZAAR) 100 MG tablet Take 1 tablet (100 mg) by mouth daily 90 tablet 3     sertraline (ZOLOFT) 50 MG tablet Take 1 tablet (50 mg) by mouth daily 90 tablet 3         Review of Systems   Constitutional, HEENT, cardiovascular, pulmonary, GI, , musculoskeletal, neuro, skin, endocrine and psych systems are negative, except as otherwise noted.      Objective    BP (!) 134/92 (BP Location: Right arm, Patient Position: Sitting, Cuff Size: Adult Regular)   Pulse 102   Temp 98.6  F (37  C) (Tympanic)   Resp 20   Wt 109.3 kg (240 lb 14.4 oz)   SpO2 97%   BMI 32.63 kg/m    Body mass index is 32.63 kg/m .  Physical Exam   GENERAL APPEARANCE: alert, no distress and over weight  ORTHO:   SHOULDER Exam-Left   Inspection: no swelling, no bruising, no discoloration, no obvious deformity, no asymmetry, no glenohumeral joint anterior bulge, no distal clavicle elevation, no muscle atrophy, no scapular winging   Tenderness of: SC joint- no, clavicle(prox-mid)- no, clavicle-(mid-distal)- no, AC joint- no, acromion- no, anterior capsule- no, prox bicep tendon- no, greater tuberosity- no, prox humerus- no, supraspinatous- no, infraspinatous- no, superior trapezious- no, rhomboids- no   Range of Motion: Active- forward flexion- normal, abduction- normal, external rotation- normal, internal rotation- normal  Range of Motion: Passive- forward flexion- normal, abduction- normal, external rotation- normal, internal rotation- normal   Strength: forward flexion- 5/5, abduction- 5/5, internal rotation- 5/5, external rotation- 5/5 and bicep- full   Special tests: Neers- negative, Gil(supraspinatous)- POSITIVE, Positive painful arc- POSITIVE, cross arm adduction- POSITIVE and Speeds- negative        Lumber/Thoracic Spine Exam: Tender:  right SI joint  Non-tender:  thoracic spinous processes, left parathoracic muscles,  right parathoracic muscles, lumbar spinous processes, left para lumbar muscles, right para lumbar muscles, left sciatic notch  Range of Motion:  All normal  Strength:  able to heel walk, able to toe walk  Special tests:  negative straight leg raises    Hip Exam: Hip ROM full, left greater trocanter non-tender, right greater trocanter non-tender      No results found for this or any previous visit (from the past 24 hour(s)).

## 2022-06-03 NOTE — PATIENT INSTRUCTIONS
Health Care Maintenance:  You are due for a colonoscopy.  Please call [phone] to schedule    Shoulder pain:  Xray today  Likely chronic rotator cuff tendonitis causing impingement  Physical therapy is the best treatment     Hip Pain:  Xray today  Sounds like sciatica and not hip arthritis  Best treatments are muscle relaxer, physical therapy, stretching  May consider steroid shot but blood sugar would have to be better controlled  Try topicals such as Voltaren, Aspercream with Lidocaine, Capsaicin, Icy Hot, Biofreeze, Salon Pas   Heat or ice  Tylenol 500-1000 mg up to 3 x day;  Ibuprofen 400-600 mg up to 3 x day - can raise blood pressure  Use the muscle relaxer    Diabetes:  Recommend follow-up in July/Aug - schedule appointment now  Get blood work prior to visit (even 30 min prior)  Recommend to start checking blood sugar once daily.  Sometimes fasting, sometimes 2 hours after a meal  Consider referral to Nutritionist      Tips for a Healthy Diet    Add more fresh fruits and vegetables to your diet.  The more colorful with the fruit or vegetable (think berries, spinach, carrots, peppers) the healthier it tends to be.  Juice is not a fruit.  Prepare the vegetables in a healthy way - steam, bake.  Avoid breading, butter/oil to cook.  Use herbs and spices instead of salt to season food.  Add more fiber to your diet.  Swap out white bread, white rice, white pasta for whole grain versions.  Reduce the portion size and frequency of carbohydrates/starches.  Look for foods that have a lower glycemic index    https://www.health.[place].edu/healthbeat/a-good-guide-to-good-carbs-the-glycemic-index    Choose healthier fats such as nuts, olive oil, avocado, etc. Stay away from lard, butter.  Decrease the frequency and portion size of 'junk food' -pizza, candy, cookies, potato chips, etc.  Watch liquid calories such as coffee drinks, juice, soda, teas.  There tends to be excessive sugar in these beverages.  Increase  protein in your diet.  Eggs, cheese, yogurt, nuts, lentils, chicken, fish are good healthy choices.  Protein keeps you albarran longer, and you are less likely to have blood sugar spikes  Eat healthy at least 80% of the time.  It is ok for a special treat every once in a while, just not every day.  When are you going to indulge (think [place] Fair time), be sure you are eating extra healthy the day before and after.      Resources:  [employer] Resources for Health and Wellness:https://www.takingcharge.[facility]/dig-yes-foods    2.   [employer] Ways To Wellness:    https://www.[employer].org/services/ways-to-wellness  Ways to Wellness offers:  Nutrition and weight management   Corrective exercise and fitness training   Lifestyle and behavioral change   Healing services  Our team includes registered dietitians, certified personal trainers, life coaches, as well as a Culinary Educator.  3. Book - Atomic Habits by John Dover.  This a good book that looks into our habits and how to sustain good healthy habits and get rid of bad habits.      Exercise Resources:    For Seniors or Those with Pain/Mobility Issues:  Silver Sneakers  https://tools.Numari/  Anytime Fitness, Snap Fitness, [facility], [facility]  2. Essentrics Stretch (airs on PBS)  https://Numari.The Luxe Nomad/  3. Your Juniper - small group classes (in person or online) that help you stay active. Free or low cost  YourBeiBei.Coin-Tech  4. Yoga for Seniors - free, online  https://www.Fanvibe.[employer] Lux[employer] Nomad/content/yoga-seniors    Other Exercise Apps/Websites  [employer] Training Club  [facility] Digital Membership  Fitness  - free, You Tube channel

## 2022-07-28 ENCOUNTER — TELEPHONE (OUTPATIENT)
Dept: FAMILY MEDICINE | Facility: CLINIC | Age: 52
End: 2022-07-28

## 2022-07-28 NOTE — TELEPHONE ENCOUNTER
Ilya Villagran was identified as being non-adherent to his/her losartan (medication name) in the last 30-90 days.  Reason(s) identified for non-adherence: other Can't reach patient. Attempted x3 calls and voice mails  Intervention(s) offered to assist patient with adherence:  Other : none due to not being able to get in touch with patient     Recommendation to Provider: Please follow up with patient at next appointment and address    Completed by: Thank you,  Smita Chacko, Pharm.D.  [facility]

## 2022-08-02 ENCOUNTER — TELEPHONE (OUTPATIENT)
Dept: FAMILY MEDICINE | Facility: CLINIC | Age: 52
End: 2022-08-02

## 2022-08-02 DIAGNOSIS — E11.9 TYPE 2 DIABETES MELLITUS WITHOUT COMPLICATION, WITHOUT LONG-TERM CURRENT USE OF INSULIN (H): Primary | ICD-10-CM

## 2022-08-02 NOTE — TELEPHONE ENCOUNTER
Patient Quality Outreach    Patient is due for the following:   Diabetes -  A1C, LDL (Fasting) and Diabetic Follow-Up Visit  Colon Cancer Screening    Next Steps:   Schedule a office visit for Fasting labs and office visit for Diabetes    Type of outreach:    Phone, left message for patient/parent to call back.      Questions for provider review:    None     Timmy Barron, CMA

## 2022-08-12 ENCOUNTER — LAB (OUTPATIENT)
Dept: LAB | Facility: CLINIC | Age: 52
End: 2022-08-12
Payer: COMMERCIAL

## 2022-08-12 DIAGNOSIS — E11.9 TYPE 2 DIABETES MELLITUS WITHOUT COMPLICATION, WITHOUT LONG-TERM CURRENT USE OF INSULIN (H): ICD-10-CM

## 2022-08-12 DIAGNOSIS — E11.9 TYPE 2 DIABETES MELLITUS WITHOUT COMPLICATION, WITHOUT LONG-TERM CURRENT USE OF INSULIN (H): Primary | ICD-10-CM

## 2022-08-12 LAB
ANION GAP SERPL CALCULATED.3IONS-SCNC: 4 MMOL/L (ref 3–14)
BUN SERPL-MCNC: 18 MG/DL (ref 7–30)
CALCIUM SERPL-MCNC: 8.7 MG/DL (ref 8.5–10.1)
CHLORIDE BLD-SCNC: 103 MMOL/L (ref 94–109)
CO2 SERPL-SCNC: 30 MMOL/L (ref 20–32)
CREAT SERPL-MCNC: 0.8 MG/DL (ref 0.66–1.25)
GFR SERPL CREATININE-BSD FRML MDRD: >90 ML/MIN/1.73M2
GLUCOSE BLD-MCNC: 160 MG/DL (ref 70–99)
HBA1C MFR BLD: 7 % (ref 0–5.6)
POTASSIUM BLD-SCNC: 4.2 MMOL/L (ref 3.4–5.3)
SODIUM SERPL-SCNC: 137 MMOL/L (ref 133–144)

## 2022-08-12 PROCEDURE — 83036 HEMOGLOBIN GLYCOSYLATED A1C: CPT

## 2022-08-12 PROCEDURE — 80048 BASIC METABOLIC PNL TOTAL CA: CPT

## 2022-08-12 PROCEDURE — 80061 LIPID PANEL: CPT

## 2022-08-12 PROCEDURE — 36415 COLL VENOUS BLD VENIPUNCTURE: CPT

## 2022-08-12 NOTE — LETTER
August 12, 2022      Ilya AZUL Ohlew  [id number] [address] [id number]            Ilya,      Kidney function and electrolytes are normal.  Hemoglobin A1c is significantly improved from April which is great.  Recommend to recheck in 3 months.  Keep working on healthy diet and regular physical exercise.      Resulted Orders   Hemoglobin A1c   Result Value Ref Range    Hemoglobin A1C 7.0 (H) 0.0 - 5.6 %      Comment:      Normal <5.7%   Prediabetes 5.7-6.4%    Diabetes 6.5% or higher     Note: Adopted from ADA consensus guidelines.   Basic metabolic panel  (Ca, Cl, CO2, Creat, Gluc, K, Na, BUN)   Result Value Ref Range    Sodium 137 133 - 144 mmol/L    Potassium 4.2 3.4 - 5.3 mmol/L    Chloride 103 94 - 109 mmol/L    Carbon Dioxide (CO2) 30 20 - 32 mmol/L    Anion Gap 4 3 - 14 mmol/L    Urea Nitrogen 18 7 - 30 mg/dL    Creatinine 0.80 0.66 - 1.25 mg/dL    Calcium 8.7 8.5 - 10.1 mg/dL    Glucose 160 (H) 70 - 99 mg/dL    GFR Estimate >90 >60 mL/min/1.73m2      Comment:      Effective December 21, 2021 eGFRcr in adults is calculated using the 2021 CKD-EPI creatinine equation which includes age and gender ( et al., NEJM, DOI: 10.1056/EPAGoi6196244)       If you have any questions or concerns, please call the clinic at the number listed above.       Sincerely,      Carin Pepe, /aw

## 2022-08-12 NOTE — RESULT ENCOUNTER NOTE
Kidney function and electrolytes are normal.  Hemoglobin A1c is significantly improved from April which is great.  Recommend to recheck in 3 months.  Keep working on healthy diet and regular physical exercise

## 2022-08-12 NOTE — LETTER
August 15, 2022      Ilya Villagran  [address] [address] [id number]        Dear ,    We are writing to inform you of your test results.    Cholesteol slightly improved from 3 months ago.  Keep working on diet and exercise changes  Component      Latest Ref Rng & Units 8/12/2022   Cholesterol      <200 mg/dL 190   Triglycerides      <150 mg/dL 222 (H)   HDL Cholesterol      >=40 mg/dL 42   LDL Cholesterol Calculated      <=100 mg/dL 104 (H)   Non HDL Cholesterol      <130 mg/dL 148 (H)       If you have any questions or concerns, please call the clinic at the number listed above.       Sincerely,      Carin Pepe, DO

## 2022-08-15 LAB
CHOLEST SERPL-MCNC: 190 MG/DL
HDLC SERPL-MCNC: 42 MG/DL
LDLC SERPL CALC-MCNC: 104 MG/DL
NONHDLC SERPL-MCNC: 148 MG/DL
TRIGL SERPL-MCNC: 222 MG/DL

## 2022-08-15 NOTE — TELEPHONE ENCOUNTER
They were ordered, not sure why not done.  Added on.     Recommend follow-up labs and clinic visit in 3 months

## 2022-08-15 NOTE — TELEPHONE ENCOUNTER
Patient had lab apt 8/12. Lipids were not done, does he need another lab apt ? No upcoming apts scheduled.

## 2022-11-09 ENCOUNTER — TELEPHONE (OUTPATIENT)
Dept: FAMILY MEDICINE | Facility: CLINIC | Age: 52
End: 2022-11-09

## 2022-11-09 NOTE — TELEPHONE ENCOUNTER
Panel Management:    Patient's blood pressure was elevated at a recent clinic visit.  It is important to get blood pressure < 140/90    Please contact the patient and have them schedule RN visit for free blood pressure check

## 2022-11-21 ENCOUNTER — HOSPITAL ENCOUNTER (INPATIENT)
Facility: CLINIC | Age: 52
LOS: 3 days | Discharge: HOME | DRG: 152 | End: 2022-11-24
Attending: FAMILY MEDICINE | Admitting: INTERNAL MEDICINE
Payer: COMMERCIAL

## 2022-11-21 ENCOUNTER — APPOINTMENT (OUTPATIENT)
Dept: CT IMAGING | Facility: CLINIC | Age: 52
End: 2022-11-21
Attending: FAMILY MEDICINE
Payer: COMMERCIAL

## 2022-11-21 ENCOUNTER — HOSPITAL ENCOUNTER (EMERGENCY)
Facility: CLINIC | Age: 52
Discharge: ANOTHER HOSPITAL | End: 2022-11-21
Attending: FAMILY MEDICINE | Admitting: FAMILY MEDICINE
Payer: COMMERCIAL

## 2022-11-21 VITALS
WEIGHT: 240 LBS | RESPIRATION RATE: 20 BRPM | HEART RATE: 103 BPM | OXYGEN SATURATION: 92 % | TEMPERATURE: 99.1 F | HEIGHT: 72 IN | BODY MASS INDEX: 32.51 KG/M2 | DIASTOLIC BLOOD PRESSURE: 78 MMHG | SYSTOLIC BLOOD PRESSURE: 120 MMHG

## 2022-11-21 DIAGNOSIS — J02.0 ACUTE STREPTOCOCCAL PHARYNGITIS: ICD-10-CM

## 2022-11-21 DIAGNOSIS — E11.10: ICD-10-CM

## 2022-11-21 DIAGNOSIS — J05.10 EPIGLOTTITIS: ICD-10-CM

## 2022-11-21 DIAGNOSIS — R73.9 HYPERGLYCEMIA: ICD-10-CM

## 2022-11-21 DIAGNOSIS — R19.7 DIARRHEA, UNSPECIFIED TYPE: ICD-10-CM

## 2022-11-21 DIAGNOSIS — F41.9 ANXIETY AND DEPRESSION: ICD-10-CM

## 2022-11-21 DIAGNOSIS — J04.30 SUPRAGLOTTITIS WITHOUT AIRWAY OBSTRUCTION: ICD-10-CM

## 2022-11-21 DIAGNOSIS — J10.1 INFLUENZA A: ICD-10-CM

## 2022-11-21 DIAGNOSIS — R00.0 TACHYCARDIA: ICD-10-CM

## 2022-11-21 DIAGNOSIS — E87.1 HYPONATREMIA: ICD-10-CM

## 2022-11-21 DIAGNOSIS — J02.0 ACUTE STREPTOCOCCAL PHARYNGITIS: Primary | ICD-10-CM

## 2022-11-21 DIAGNOSIS — R06.2 WHEEZING: ICD-10-CM

## 2022-11-21 DIAGNOSIS — E11.9 TYPE 2 DIABETES MELLITUS WITHOUT COMPLICATION, WITHOUT LONG-TERM CURRENT USE OF INSULIN (H): ICD-10-CM

## 2022-11-21 DIAGNOSIS — E11.65 UNCONTROLLED TYPE 2 DIABETES MELLITUS WITH HYPERGLYCEMIA (H): ICD-10-CM

## 2022-11-21 DIAGNOSIS — F32.A ANXIETY AND DEPRESSION: ICD-10-CM

## 2022-11-21 LAB
ALBUMIN SERPL BCG-MCNC: 3.7 G/DL (ref 3.5–5.2)
ALP SERPL-CCNC: 69 U/L (ref 40–129)
ALT SERPL W P-5'-P-CCNC: 20 U/L (ref 10–50)
ANION GAP SERPL CALCULATED.3IONS-SCNC: 17 MMOL/L (ref 7–15)
AST SERPL W P-5'-P-CCNC: 16 U/L (ref 10–50)
BASE EXCESS BLDV CALC-SCNC: -2.1 MMOL/L (ref -7.7–1.9)
BASE EXCESS BLDV CALC-SCNC: -2.9 MMOL/L (ref -7.7–1.9)
BASOPHILS # BLD MANUAL: 0 10E3/UL (ref 0–0.2)
BASOPHILS NFR BLD MANUAL: 0 %
BILIRUB SERPL-MCNC: 1.1 MG/DL
BUN SERPL-MCNC: 28 MG/DL (ref 6–20)
CALCIUM SERPL-MCNC: 8.8 MG/DL (ref 8.6–10)
CHLORIDE SERPL-SCNC: 86 MMOL/L (ref 98–107)
CREAT SERPL-MCNC: 1.05 MG/DL (ref 0.67–1.17)
DEPRECATED HCO3 PLAS-SCNC: 24 MMOL/L (ref 22–29)
DEPRECATED S PYO AG THROAT QL EIA: POSITIVE
EOSINOPHIL # BLD MANUAL: 0 10E3/UL (ref 0–0.7)
EOSINOPHIL NFR BLD MANUAL: 0 %
ERYTHROCYTE [DISTWIDTH] IN BLOOD BY AUTOMATED COUNT: 12.7 % (ref 10–15)
FLUAV RNA SPEC QL NAA+PROBE: POSITIVE
FLUBV RNA RESP QL NAA+PROBE: NEGATIVE
GFR SERPL CREATININE-BSD FRML MDRD: 85 ML/MIN/1.73M2
GLUCOSE BLDC GLUCOMTR-MCNC: 355 MG/DL (ref 70–99)
GLUCOSE BLDC GLUCOMTR-MCNC: 402 MG/DL (ref 70–99)
GLUCOSE BLDC GLUCOMTR-MCNC: 492 MG/DL (ref 70–99)
GLUCOSE SERPL-MCNC: 427 MG/DL (ref 70–99)
HCO3 BLDV-SCNC: 21 MMOL/L (ref 21–28)
HCO3 BLDV-SCNC: 21 MMOL/L (ref 21–28)
HCT VFR BLD AUTO: 41.3 % (ref 40–53)
HGB BLD-MCNC: 14.8 G/DL (ref 13.3–17.7)
KETONES BLD-SCNC: 1.2 MMOL/L (ref 0–0.6)
KETONES BLD-SCNC: 1.9 MMOL/L (ref 0–0.6)
LYMPHOCYTES # BLD MANUAL: 0.4 10E3/UL (ref 0.8–5.3)
LYMPHOCYTES NFR BLD MANUAL: 7 %
MCH RBC QN AUTO: 28.5 PG (ref 26.5–33)
MCHC RBC AUTO-ENTMCNC: 35.8 G/DL (ref 31.5–36.5)
MCV RBC AUTO: 79 FL (ref 78–100)
MONOCYTES # BLD MANUAL: 0.3 10E3/UL (ref 0–1.3)
MONOCYTES NFR BLD MANUAL: 6 %
NEUTROPHILS # BLD MANUAL: 4.7 10E3/UL (ref 1.6–8.3)
NEUTROPHILS NFR BLD MANUAL: 87 %
O2/TOTAL GAS SETTING VFR VENT: 0 %
O2/TOTAL GAS SETTING VFR VENT: 21 %
PCO2 BLDV: 29 MM HG (ref 40–50)
PCO2 BLDV: 34 MM HG (ref 40–50)
PH BLDV: 7.4 [PH] (ref 7.32–7.43)
PH BLDV: 7.46 [PH] (ref 7.32–7.43)
PLAT MORPH BLD: ABNORMAL
PLATELET # BLD AUTO: 118 10E3/UL (ref 150–450)
PO2 BLDV: 43 MM HG (ref 25–47)
PO2 BLDV: 57 MM HG (ref 25–47)
POTASSIUM SERPL-SCNC: 3.9 MMOL/L (ref 3.4–5.3)
PROT SERPL-MCNC: 7.8 G/DL (ref 6.4–8.3)
RBC # BLD AUTO: 5.2 10E6/UL (ref 4.4–5.9)
RBC MORPH BLD: ABNORMAL
SARS-COV-2 RNA RESP QL NAA+PROBE: NEGATIVE
SODIUM SERPL-SCNC: 127 MMOL/L (ref 136–145)
WBC # BLD AUTO: 5.4 10E3/UL (ref 4–11)

## 2022-11-21 PROCEDURE — 258N000003 HC RX IP 258 OP 636: Performed by: FAMILY MEDICINE

## 2022-11-21 PROCEDURE — 250N000012 HC RX MED GY IP 250 OP 636 PS 637: Performed by: EMERGENCY MEDICINE

## 2022-11-21 PROCEDURE — 99285 EMERGENCY DEPT VISIT HI MDM: CPT | Mod: CS,25 | Performed by: FAMILY MEDICINE

## 2022-11-21 PROCEDURE — 250N000011 HC RX IP 250 OP 636: Performed by: INTERNAL MEDICINE

## 2022-11-21 PROCEDURE — 87880 STREP A ASSAY W/OPTIC: CPT | Performed by: FAMILY MEDICINE

## 2022-11-21 PROCEDURE — 200N000001 HC R&B ICU

## 2022-11-21 PROCEDURE — 82803 BLOOD GASES ANY COMBINATION: CPT | Performed by: FAMILY MEDICINE

## 2022-11-21 PROCEDURE — 87636 SARSCOV2 & INF A&B AMP PRB: CPT | Performed by: FAMILY MEDICINE

## 2022-11-21 PROCEDURE — 96372 THER/PROPH/DIAG INJ SC/IM: CPT | Performed by: EMERGENCY MEDICINE

## 2022-11-21 PROCEDURE — 36415 COLL VENOUS BLD VENIPUNCTURE: CPT | Performed by: EMERGENCY MEDICINE

## 2022-11-21 PROCEDURE — 85027 COMPLETE CBC AUTOMATED: CPT | Performed by: FAMILY MEDICINE

## 2022-11-21 PROCEDURE — 80053 COMPREHEN METABOLIC PANEL: CPT | Performed by: EMERGENCY MEDICINE

## 2022-11-21 PROCEDURE — 87636 SARSCOV2 & INF A&B AMP PRB: CPT | Performed by: EMERGENCY MEDICINE

## 2022-11-21 PROCEDURE — 258N000003 HC RX IP 258 OP 636: Performed by: INTERNAL MEDICINE

## 2022-11-21 PROCEDURE — 82010 KETONE BODYS QUAN: CPT | Performed by: FAMILY MEDICINE

## 2022-11-21 PROCEDURE — 96361 HYDRATE IV INFUSION ADD-ON: CPT | Performed by: FAMILY MEDICINE

## 2022-11-21 PROCEDURE — 87880 STREP A ASSAY W/OPTIC: CPT | Performed by: EMERGENCY MEDICINE

## 2022-11-21 PROCEDURE — C9803 HOPD COVID-19 SPEC COLLECT: HCPCS | Performed by: FAMILY MEDICINE

## 2022-11-21 PROCEDURE — 250N000013 HC RX MED GY IP 250 OP 250 PS 637: Performed by: FAMILY MEDICINE

## 2022-11-21 PROCEDURE — 96365 THER/PROPH/DIAG IV INF INIT: CPT | Mod: 59 | Performed by: FAMILY MEDICINE

## 2022-11-21 PROCEDURE — 99285 EMERGENCY DEPT VISIT HI MDM: CPT | Mod: CS | Performed by: FAMILY MEDICINE

## 2022-11-21 PROCEDURE — 96375 TX/PRO/DX INJ NEW DRUG ADDON: CPT | Performed by: FAMILY MEDICINE

## 2022-11-21 PROCEDURE — 93010 ELECTROCARDIOGRAM REPORT: CPT | Performed by: FAMILY MEDICINE

## 2022-11-21 PROCEDURE — 70491 CT SOFT TISSUE NECK W/DYE: CPT

## 2022-11-21 PROCEDURE — 250N000011 HC RX IP 250 OP 636: Performed by: EMERGENCY MEDICINE

## 2022-11-21 PROCEDURE — 250N000013 HC RX MED GY IP 250 OP 250 PS 637: Performed by: INTERNAL MEDICINE

## 2022-11-21 PROCEDURE — 93005 ELECTROCARDIOGRAM TRACING: CPT | Performed by: FAMILY MEDICINE

## 2022-11-21 PROCEDURE — 258N000003 HC RX IP 258 OP 636: Performed by: EMERGENCY MEDICINE

## 2022-11-21 PROCEDURE — 250N000012 HC RX MED GY IP 250 OP 636 PS 637: Performed by: INTERNAL MEDICINE

## 2022-11-21 PROCEDURE — 80053 COMPREHEN METABOLIC PANEL: CPT | Performed by: FAMILY MEDICINE

## 2022-11-21 PROCEDURE — 36415 COLL VENOUS BLD VENIPUNCTURE: CPT | Performed by: FAMILY MEDICINE

## 2022-11-21 PROCEDURE — 85007 BL SMEAR W/DIFF WBC COUNT: CPT | Performed by: FAMILY MEDICINE

## 2022-11-21 PROCEDURE — 82010 KETONE BODYS QUAN: CPT | Performed by: EMERGENCY MEDICINE

## 2022-11-21 PROCEDURE — 99222 1ST HOSP IP/OBS MODERATE 55: CPT | Mod: AI | Performed by: INTERNAL MEDICINE

## 2022-11-21 PROCEDURE — 250N000011 HC RX IP 250 OP 636: Performed by: FAMILY MEDICINE

## 2022-11-21 PROCEDURE — 82803 BLOOD GASES ANY COMBINATION: CPT | Performed by: EMERGENCY MEDICINE

## 2022-11-21 PROCEDURE — 250N000009 HC RX 250: Performed by: FAMILY MEDICINE

## 2022-11-21 RX ORDER — ACETAMINOPHEN 325 MG/1
650 TABLET ORAL ONCE
Status: COMPLETED | OUTPATIENT
Start: 2022-11-21 | End: 2022-11-21

## 2022-11-21 RX ORDER — AMPICILLIN AND SULBACTAM 2; 1 G/1; G/1
3 INJECTION, POWDER, FOR SOLUTION INTRAMUSCULAR; INTRAVENOUS EVERY 6 HOURS
Status: DISCONTINUED | OUTPATIENT
Start: 2022-11-21 | End: 2022-11-23

## 2022-11-21 RX ORDER — NICOTINE POLACRILEX 4 MG
15-30 LOZENGE BUCCAL
Status: DISCONTINUED | OUTPATIENT
Start: 2022-11-21 | End: 2022-11-24 | Stop reason: HOSPADM

## 2022-11-21 RX ORDER — DEXAMETHASONE SODIUM PHOSPHATE 10 MG/ML
10 INJECTION, SOLUTION INTRAMUSCULAR; INTRAVENOUS ONCE
Status: COMPLETED | OUTPATIENT
Start: 2022-11-21 | End: 2022-11-21

## 2022-11-21 RX ORDER — SUCRALFATE ORAL 1 G/10ML
1 SUSPENSION ORAL ONCE
Status: COMPLETED | OUTPATIENT
Start: 2022-11-21 | End: 2022-11-21

## 2022-11-21 RX ORDER — OXYMETAZOLINE HYDROCHLORIDE 0.05 G/100ML
2 SPRAY NASAL 2 TIMES DAILY
Status: DISCONTINUED | OUTPATIENT
Start: 2022-11-21 | End: 2022-11-21 | Stop reason: HOSPADM

## 2022-11-21 RX ORDER — CODEINE PHOSPHATE AND GUAIFENESIN 10; 100 MG/5ML; MG/5ML
5 SOLUTION ORAL EVERY 6 HOURS PRN
Status: DISCONTINUED | OUTPATIENT
Start: 2022-11-21 | End: 2022-11-24 | Stop reason: HOSPADM

## 2022-11-21 RX ORDER — IOPAMIDOL 755 MG/ML
90 INJECTION, SOLUTION INTRAVASCULAR ONCE
Status: COMPLETED | OUTPATIENT
Start: 2022-11-21 | End: 2022-11-21

## 2022-11-21 RX ORDER — PENICILLIN V POTASSIUM 250 MG/1
500 TABLET, FILM COATED ORAL ONCE
Status: COMPLETED | OUTPATIENT
Start: 2022-11-21 | End: 2022-11-21

## 2022-11-21 RX ORDER — AMPICILLIN AND SULBACTAM 2; 1 G/1; G/1
3 INJECTION, POWDER, FOR SOLUTION INTRAMUSCULAR; INTRAVENOUS ONCE
Status: COMPLETED | OUTPATIENT
Start: 2022-11-21 | End: 2022-11-21

## 2022-11-21 RX ORDER — SODIUM CHLORIDE 9 MG/ML
INJECTION, SOLUTION INTRAVENOUS CONTINUOUS
Status: DISCONTINUED | OUTPATIENT
Start: 2022-11-21 | End: 2022-11-24 | Stop reason: HOSPADM

## 2022-11-21 RX ORDER — ONDANSETRON 4 MG/1
4 TABLET, ORALLY DISINTEGRATING ORAL EVERY 8 HOURS PRN
Qty: 10 TABLET | Refills: 0 | Status: ON HOLD | OUTPATIENT
Start: 2022-11-21 | End: 2022-11-24

## 2022-11-21 RX ORDER — HYDROMORPHONE HYDROCHLORIDE 1 MG/ML
0.5 INJECTION, SOLUTION INTRAMUSCULAR; INTRAVENOUS; SUBCUTANEOUS
Status: DISCONTINUED | OUTPATIENT
Start: 2022-11-21 | End: 2022-11-21 | Stop reason: HOSPADM

## 2022-11-21 RX ORDER — KETOROLAC TROMETHAMINE 15 MG/ML
15 INJECTION, SOLUTION INTRAMUSCULAR; INTRAVENOUS ONCE
Status: COMPLETED | OUTPATIENT
Start: 2022-11-21 | End: 2022-11-21

## 2022-11-21 RX ORDER — LIDOCAINE 40 MG/G
CREAM TOPICAL
Status: DISCONTINUED | OUTPATIENT
Start: 2022-11-21 | End: 2022-11-24 | Stop reason: HOSPADM

## 2022-11-21 RX ORDER — DIPHENHYDRAMINE HYDROCHLORIDE AND LIDOCAINE HYDROCHLORIDE AND ALUMINUM HYDROXIDE AND MAGNESIUM HYDRO
5-10 KIT EVERY 6 HOURS PRN
Qty: 237 ML | Refills: 0 | Status: ON HOLD | OUTPATIENT
Start: 2022-11-21 | End: 2022-11-24

## 2022-11-21 RX ORDER — DEXAMETHASONE SODIUM PHOSPHATE 10 MG/ML
6 INJECTION, SOLUTION INTRAMUSCULAR; INTRAVENOUS EVERY 8 HOURS
Status: DISCONTINUED | OUTPATIENT
Start: 2022-11-21 | End: 2022-11-22

## 2022-11-21 RX ORDER — NICOTINE POLACRILEX 4 MG
15-30 LOZENGE BUCCAL
Status: DISCONTINUED | OUTPATIENT
Start: 2022-11-21 | End: 2022-11-21 | Stop reason: HOSPADM

## 2022-11-21 RX ORDER — DEXTROSE MONOHYDRATE 25 G/50ML
25-50 INJECTION, SOLUTION INTRAVENOUS
Status: DISCONTINUED | OUTPATIENT
Start: 2022-11-21 | End: 2022-11-21 | Stop reason: HOSPADM

## 2022-11-21 RX ORDER — ENOXAPARIN SODIUM 100 MG/ML
40 INJECTION SUBCUTANEOUS AT BEDTIME
Status: DISCONTINUED | OUTPATIENT
Start: 2022-11-21 | End: 2022-11-24 | Stop reason: HOSPADM

## 2022-11-21 RX ORDER — LORAZEPAM 2 MG/ML
0.5 INJECTION INTRAMUSCULAR ONCE
Status: COMPLETED | OUTPATIENT
Start: 2022-11-21 | End: 2022-11-21

## 2022-11-21 RX ORDER — SODIUM CHLORIDE, SODIUM LACTATE, POTASSIUM CHLORIDE, CALCIUM CHLORIDE 600; 310; 30; 20 MG/100ML; MG/100ML; MG/100ML; MG/100ML
1000 INJECTION, SOLUTION INTRAVENOUS CONTINUOUS
Status: DISCONTINUED | OUTPATIENT
Start: 2022-11-21 | End: 2022-11-21 | Stop reason: HOSPADM

## 2022-11-21 RX ORDER — OSELTAMIVIR PHOSPHATE 6 MG/ML
75 FOR SUSPENSION ORAL 2 TIMES DAILY
Status: DISCONTINUED | OUTPATIENT
Start: 2022-11-21 | End: 2022-11-23

## 2022-11-21 RX ORDER — SODIUM CHLORIDE 9 MG/ML
INJECTION, SOLUTION INTRAVENOUS CONTINUOUS
Status: DISCONTINUED | OUTPATIENT
Start: 2022-11-21 | End: 2022-11-21 | Stop reason: HOSPADM

## 2022-11-21 RX ORDER — PENICILLIN V POTASSIUM 500 MG/1
500 TABLET, FILM COATED ORAL 2 TIMES DAILY
Qty: 30 TABLET | Refills: 0 | Status: ON HOLD | OUTPATIENT
Start: 2022-11-21 | End: 2022-11-24

## 2022-11-21 RX ORDER — DEXTROSE MONOHYDRATE 25 G/50ML
25-50 INJECTION, SOLUTION INTRAVENOUS
Status: DISCONTINUED | OUTPATIENT
Start: 2022-11-21 | End: 2022-11-24 | Stop reason: HOSPADM

## 2022-11-21 RX ORDER — SODIUM CHLORIDE 9 MG/ML
1000 INJECTION, SOLUTION INTRAVENOUS CONTINUOUS
Status: DISCONTINUED | OUTPATIENT
Start: 2022-11-21 | End: 2022-11-21 | Stop reason: HOSPADM

## 2022-11-21 RX ORDER — LIDOCAINE HYDROCHLORIDE 40 MG/ML
5 INJECTION, SOLUTION RETROBULBAR ONCE
Status: DISCONTINUED | OUTPATIENT
Start: 2022-11-21 | End: 2022-11-21 | Stop reason: HOSPADM

## 2022-11-21 RX ADMIN — SODIUM CHLORIDE: 9 INJECTION, SOLUTION INTRAVENOUS at 23:39

## 2022-11-21 RX ADMIN — DEXAMETHASONE SODIUM PHOSPHATE 10 MG: 10 INJECTION, SOLUTION INTRAMUSCULAR; INTRAVENOUS at 11:54

## 2022-11-21 RX ADMIN — SODIUM CHLORIDE 1000 ML: 9 INJECTION, SOLUTION INTRAVENOUS at 12:05

## 2022-11-21 RX ADMIN — HYDROMORPHONE HYDROCHLORIDE 0.5 MG: 1 INJECTION, SOLUTION INTRAMUSCULAR; INTRAVENOUS; SUBCUTANEOUS at 11:52

## 2022-11-21 RX ADMIN — INSULIN HUMAN 12 UNITS: 100 INJECTION, SOLUTION PARENTERAL at 17:43

## 2022-11-21 RX ADMIN — OSELTAMIVIR PHOSPHATE 75 MG: 6 POWDER, FOR SUSPENSION ORAL at 22:56

## 2022-11-21 RX ADMIN — LORAZEPAM 0.5 MG: 2 INJECTION INTRAMUSCULAR; INTRAVENOUS at 11:51

## 2022-11-21 RX ADMIN — AMPICILLIN SODIUM AND SULBACTAM SODIUM 3 G: 2; 1 INJECTION, POWDER, FOR SOLUTION INTRAMUSCULAR; INTRAVENOUS at 11:56

## 2022-11-21 RX ADMIN — IOPAMIDOL 90 ML: 755 INJECTION, SOLUTION INTRAVENOUS at 11:23

## 2022-11-21 RX ADMIN — PENICILLIN V POTASSIUM 500 MG: 250 TABLET, FILM COATED ORAL at 06:51

## 2022-11-21 RX ADMIN — AMPICILLIN SODIUM AND SULBACTAM SODIUM 3 G: 2; 1 INJECTION, POWDER, FOR SOLUTION INTRAMUSCULAR; INTRAVENOUS at 23:40

## 2022-11-21 RX ADMIN — SODIUM CHLORIDE 1000 ML: 9 INJECTION, SOLUTION INTRAVENOUS at 08:20

## 2022-11-21 RX ADMIN — SODIUM CHLORIDE 80 ML: 9 INJECTION, SOLUTION INTRAVENOUS at 11:23

## 2022-11-21 RX ADMIN — SUCRALFATE 1 G: 1 SUSPENSION ORAL at 08:21

## 2022-11-21 RX ADMIN — ENOXAPARIN SODIUM 40 MG: 40 INJECTION SUBCUTANEOUS at 22:57

## 2022-11-21 RX ADMIN — ACETAMINOPHEN 650 MG: 325 TABLET, FILM COATED ORAL at 06:50

## 2022-11-21 RX ADMIN — INSULIN ASPART 4 UNITS: 100 INJECTION, SOLUTION INTRAVENOUS; SUBCUTANEOUS at 23:06

## 2022-11-21 RX ADMIN — IBUPROFEN 600 MG: 400 TABLET ORAL at 06:52

## 2022-11-21 RX ADMIN — KETOROLAC TROMETHAMINE 15 MG: 15 INJECTION, SOLUTION INTRAMUSCULAR; INTRAVENOUS at 08:20

## 2022-11-21 RX ADMIN — DEXAMETHASONE SODIUM PHOSPHATE 6 MG: 10 INJECTION, SOLUTION INTRAMUSCULAR; INTRAVENOUS at 22:58

## 2022-11-21 RX ADMIN — SODIUM CHLORIDE, POTASSIUM CHLORIDE, SODIUM LACTATE AND CALCIUM CHLORIDE 1000 ML: 600; 310; 30; 20 INJECTION, SOLUTION INTRAVENOUS at 18:38

## 2022-11-21 RX ADMIN — AMPICILLIN SODIUM AND SULBACTAM SODIUM 3 G: 2; 1 INJECTION, POWDER, FOR SOLUTION INTRAMUSCULAR; INTRAVENOUS at 17:00

## 2022-11-21 RX ADMIN — SODIUM CHLORIDE, POTASSIUM CHLORIDE, SODIUM LACTATE AND CALCIUM CHLORIDE 1000 ML: 600; 310; 30; 20 INJECTION, SOLUTION INTRAVENOUS at 17:00

## 2022-11-21 ASSESSMENT — ACTIVITIES OF DAILY LIVING (ADL)
ADLS_ACUITY_SCORE: 35
ADLS_ACUITY_SCORE: 35
ADLS_ACUITY_SCORE: 31
ADLS_ACUITY_SCORE: 33
ADLS_ACUITY_SCORE: 35
ADLS_ACUITY_SCORE: 33
ADLS_ACUITY_SCORE: 35
ADLS_ACUITY_SCORE: 33

## 2022-11-21 ASSESSMENT — COLUMBIA-SUICIDE SEVERITY RATING SCALE - C-SSRS
6. HAVE YOU EVER DONE ANYTHING, STARTED TO DO ANYTHING, OR PREPARED TO DO ANYTHING TO END YOUR LIFE?: NO
1. IN THE PAST MONTH, HAVE YOU WISHED YOU WERE DEAD OR WISHED YOU COULD GO TO SLEEP AND NOT WAKE UP?: NO
3. HAVE YOU BEEN THINKING ABOUT HOW YOU MIGHT KILL YOURSELF?: NO
2. HAVE YOU ACTUALLY HAD ANY THOUGHTS OF KILLING YOURSELF IN THE PAST MONTH?: NO
5. HAVE YOU STARTED TO WORK OUT OR WORKED OUT THE DETAILS OF HOW TO KILL YOURSELF? DO YOU INTEND TO CARRY OUT THIS PLAN?: NO
4. HAVE YOU HAD THESE THOUGHTS AND HAD SOME INTENTION OF ACTING ON THEM?: NO

## 2022-11-21 NOTE — ED PROVIDER NOTES
Emergency Department Patient Sign-out       Brief HPI:  This is a 52 year old male signed out to me by Dr. Ramesh at the end of his shift at 3:10 PM.  See initial ED Provider note for details of the presentation.     Significant Events prior to my assuming care: Laboratory evaluation with positive strep and influenza A study, IV fluid bolus, IV Unasyn and Decadron, intermittent racemic epi nebs, direct laryngoscopy by ED provider and ENT on-call, CT neck soft tissue study was performed.  Patient waiting bed availability for admission for supraglottitis and?  Mild epiglottitis.      Exam:   Patient Vitals for the past 24 hrs:   BP Temp Temp src Pulse Resp SpO2 Height Weight   11/21/22 1600 -- 99.1  F (37.3  C) Oral -- -- -- -- --   11/21/22 0945 (!) 163/83 (!) 100.5  F (38.1  C) Oral 103 -- 95 % -- --   11/21/22 0439 136/80 97.7  F (36.5  C) Tympanic (!) 123 20 96 % 1.829 m (6') 108.9 kg (240 lb)           ED RESULTS:   Results for orders placed or performed during the hospital encounter of 11/21/22 (from the past 24 hour(s))   Symptomatic; Yes; 11/16/2022 Influenza A/B & SARS-CoV2 (COVID-19) Virus PCR Multiplex Nose     Status: Abnormal    Collection Time: 11/21/22  4:45 AM    Specimen: Nose; Swab   Result Value Ref Range    Influenza A PCR Positive (A) Negative    Influenza B PCR Negative Negative    SARS CoV2 PCR Negative Negative    Narrative    Testing was performed using the kenia SARS-CoV-2 & Influenza A/B Assay on the k[employer] System. This test should be ordered for the detection of SARS-CoV-2 and influenza viruses in individuals who meet clinical and/or epidemiological criteria. Test performance is unknown in asymptomatic patients. This test is for in vitro diagnostic use under the FDA EUA for laboratories certified under CLIA to perform moderate and/or high complexity testing. This test has not been FDA cleared or approved. A negative result does not rule out the presence of PCR inhibitors in the  specimen or target RNA in concentration below the limit of detection for the assay. If only one viral target is positive but coinfection with multiple targets is suspected, the sample should be re-tested with another FDA cleared, approved or authorized test, if coinfection would change clinical management. [facility] are certified under the Clinical Laboratory Improvement Amendments of 1988 (CLIA-88) as qualified to perform moderate and/or high complexity laboratory testing.   Streptococcus A Rapid Scr w Reflx to PCR     Status: Abnormal    Collection Time: 11/21/22  4:45 AM    Specimen: Throat; Swab   Result Value Ref Range    Group A Strep antigen Positive (A) Negative   CBC with platelets, differential     Status: Abnormal    Collection Time: 11/21/22  5:26 AM    Narrative    The following orders were created for panel order CBC with platelets, differential.  Procedure                               Abnormality         Status                     ---------                               -----------         ------                     CBC with platelets and d...[[id number]]  Abnormal            Final result               Manual Differential[[id number]]          Abnormal            Final result                 Please view results for these tests on the individual orders.   Comprehensive metabolic panel     Status: Abnormal    Collection Time: 11/21/22  5:26 AM   Result Value Ref Range    Sodium 127 (L) 136 - 145 mmol/L    Potassium 3.9 3.4 - 5.3 mmol/L    Chloride 86 (L) 98 - 107 mmol/L    Carbon Dioxide (CO2) 24 22 - 29 mmol/L    Anion Gap 17 (H) 7 - 15 mmol/L    Urea Nitrogen 28.0 (H) 6.0 - 20.0 mg/dL    Creatinine 1.05 0.67 - 1.17 mg/dL    Calcium 8.8 8.6 - 10.0 mg/dL    Glucose 427 (H) 70 - 99 mg/dL    Alkaline Phosphatase 69 40 - 129 U/L    AST 16 10 - 50 U/L    ALT 20 10 - 50 U/L    Protein Total 7.8 6.4 - 8.3 g/dL    Albumin 3.7 3.5 - 5.2 g/dL    Bilirubin Total 1.1 <=1.2 mg/dL    GFR Estimate  85 >60 mL/min/1.73m2   CBC with platelets and differential     Status: Abnormal    Collection Time: 11/21/22  5:26 AM   Result Value Ref Range    WBC Count 5.4 4.0 - 11.0 10e3/uL    RBC Count 5.20 4.40 - 5.90 10e6/uL    Hemoglobin 14.8 13.3 - 17.7 g/dL    Hematocrit 41.3 40.0 - 53.0 %    MCV 79 78 - 100 fL    MCH 28.5 26.5 - 33.0 pg    MCHC 35.8 31.5 - 36.5 g/dL    RDW 12.7 10.0 - 15.0 %    Platelet Count 118 (L) 150 - 450 10e3/uL   Manual Differential     Status: Abnormal    Collection Time: 11/21/22  5:26 AM   Result Value Ref Range    % Neutrophils 87 %    % Lymphocytes 7 %    % Monocytes 6 %    % Eosinophils 0 %    % Basophils 0 %    Absolute Neutrophils 4.7 1.6 - 8.3 10e3/uL    Absolute Lymphocytes 0.4 (L) 0.8 - 5.3 10e3/uL    Absolute Monocytes 0.3 0.0 - 1.3 10e3/uL    Absolute Eosinophils 0.0 0.0 - 0.7 10e3/uL    Absolute Basophils 0.0 0.0 - 0.2 10e3/uL    RBC Morphology Confirmed RBC Indices     Platelet Assessment  Automated Count Confirmed. Platelet morphology is normal.     Automated Count Confirmed. Platelet morphology is normal.   Blood gas venous     Status: Abnormal    Collection Time: 11/21/22  8:19 AM   Result Value Ref Range    pH Venous 7.46 (H) 7.32 - 7.43    pCO2 Venous 29 (L) 40 - 50 mm Hg    pO2 Venous 57 (H) 25 - 47 mm Hg    Bicarbonate Venous 21 21 - 28 mmol/L    Base Excess/Deficit (+/-) -2.1 -7.7 - 1.9 mmol/L    FIO2 0    Ketone Beta-Hydroxybutyrate Quantitative     Status: Abnormal    Collection Time: 11/21/22  8:19 AM   Result Value Ref Range    Ketone (Beta-Hydroxybutyrate) Quantitative 1.2 (H) 0.0 - 0.6 mmol/L   Soft tissue neck CT w contrast     Status: None    Collection Time: 11/21/22 11:40 AM    Narrative    CT SCAN OF THE NECK WITH CONTRAST  11/21/2022 11:40 AM     HISTORY: Severe pharyngitis, inability to swallow, strep, abnormal  nasolaryngoscopy - difficulty visualizing cords with soft tissue  swelling    TECHNIQUE: Axial CT images of the neck following administration  of  intravenous contrast with reformations. Radiation dose for this scan  was reduced using automated exposure control, adjustment of the mA  and/or kV according to patient size, or iterative reconstruction  technique. 90mL Isovue-370 IV.     COMPARISON: None.     FINDINGS: Exam is mildly limited by motion-related artifact. There is  moderate hyperattenuating soft tissue swelling involving the mid to  lower aspect of the left oropharyngeal wall, the left and paramedian  aspects of the epiglottis, the left aryepiglottic fold, the left  piriform sinus, and the left false vocal fold with associated fat  stranding in the left paraglottic fat as well as mild fat stranding in  the left parapharyngeal space, and effacement of the normal fat plane  along the lateral margin of the left thyrohyoid membrane with some  mild fat stranding at that location. No definite drainable fluid  collection/abscess is identified. There is associated narrowing of the  supraglottic laryngeal airway. The true vocal folds are difficult to  evaluate due to soft tissue swelling involving the supraglottic  larynx. The subglottic larynx and visualized trachea appear normal.    The visualized aspects of the orbits appear normal. The visualized  intracranial contents appear unremarkable. Mild to moderate mucosal  thickening in the bilateral maxillary sinuses and to a lesser extent  in the other paranasal sinuses with some layering fluid in the  bilateral maxillary sinuses (right greater than left) and also  possibly in the sphenoid sinuses bilaterally. The  spaces  appear unremarkable. No gross abnormality of the parotid glands or  submandibular glands. The thyroid gland appears unremarkable on CT.  Mildly prominent upper cervical lymph nodes, likely reactive. No  definite pathologic cervical lymphadenopathy by size or morphology  criteria. No cystic/necrotic lymph nodes are seen.    Mild left carotid bifurcation atherosclerosis. The  cervical carotid  and vertebral arteries appear grossly patent. Multiple partially  visualized irregular nonspecific soft tissue nodules in the upper lung  zones bilaterally (series 2 images 120-127) measuring up to at least  23-24 mm in axial plane. Multilevel degenerative changes noted in the  cervical spine.      Impression    IMPRESSION:  1. Soft tissue swelling/edema primarily involving the left aspect of  the oropharynx/hypopharynx and supraglottic larynx, as described.  Findings are concerning for pharyngitis/supraglottitis given the  patient's history. No definite drainable fluid collection/abscess  identified.  2. Multiple indeterminate irregular noncalcified soft tissue nodules  in the upper pulmonary lobes bilaterally. These may be  infectious/inflammatory, but neoplastic disease is not entirely  excluded. Recommend short interval follow-up chest CT.    LASHAWN TREVINO MD         SYSTEM ID:  [id number]   Glucose by meter     Status: Abnormal    Collection Time: 11/21/22  4:45 PM   Result Value Ref Range    GLUCOSE BY METER POCT 492 (H) 70 - 99 mg/dL   Blood gas venous     Status: Abnormal    Collection Time: 11/21/22  4:47 PM   Result Value Ref Range    pH Venous 7.40 7.32 - 7.43    pCO2 Venous 34 (L) 40 - 50 mm Hg    pO2 Venous 43 25 - 47 mm Hg    Bicarbonate Venous 21 21 - 28 mmol/L    Base Excess/Deficit (+/-) -2.9 -7.7 - 1.9 mmol/L    FIO2 21    Ketone Beta-Hydroxybutyrate Quantitative     Status: Abnormal    Collection Time: 11/21/22  4:47 PM   Result Value Ref Range    Ketone (Beta-Hydroxybutyrate) Quantitative 1.9 (HH) 0.0 - 0.6 mmol/L       ED MEDICATIONS:   Medications   0.9% sodium chloride BOLUS (0 mLs Intravenous Stopped 11/21/22 1205)     Followed by   sodium chloride 0.9% infusion (has no administration in time range)   oxymetazoline (AFRIN) 0.05 % spray 2 spray (has no administration in time range)   lidocaine 4 % injection 5 mL (has no administration in time range)   sodium chloride 0.9%  infusion (1,000 mLs Intravenous New Bag 11/21/22 1205)   HYDROmorphone (PF) (DILAUDID) injection 0.5 mg (0.5 mg Intravenous Given 11/21/22 1152)   racEPINEPHrine neb solution 0.5 mL (0.5 mLs Nebulization Incomplete 11/21/22 1236)   ampicillin-sulbactam (UNASYN) 3 g vial to attach to  mL bag (has no administration in time range)   lactated ringers BOLUS 1,000 mL (has no administration in time range)   lactated ringers infusion (has no administration in time range)   insulin regular injection 14 Units (has no administration in time range)   glucose gel 15-30 g (has no administration in time range)     Or   dextrose 50 % injection 25-50 mL (has no administration in time range)     Or   glucagon injection 1 mg (has no administration in time range)   ibuprofen (ADVIL/MOTRIN) tablet 600 mg (600 mg Oral Given 11/21/22 0652)   acetaminophen (TYLENOL) tablet 650 mg (650 mg Oral Given 11/21/22 0650)   penicillin V (VEETID) tablet 500 mg (500 mg Oral Given 11/21/22 0651)   ketorolac (TORADOL) injection 15 mg (15 mg Intravenous Given 11/21/22 0820)   sucralfate (CARAFATE) suspension 1 g (1 g Oral Given 11/21/22 0821)   ampicillin-sulbactam (UNASYN) 3 g vial to attach to  mL bag (3 g Intravenous New Bag 11/21/22 1156)   dexamethasone PF (DECADRON) injection 10 mg (10 mg Intravenous Given 11/21/22 1154)   LORazepam (ATIVAN) injection 0.5 mg (0.5 mg Intravenous Given 11/21/22 1151)   iopamidol (ISOVUE-370) solution 90 mL (90 mLs Intravenous Given 11/21/22 1123)   sodium chloride 0.9 % bag 500 mL for CT scan flush use (80 mLs Intravenous Given 11/21/22 1123)         Impression:    ICD-10-CM    1. Hyponatremia  E87.1 Primary Care Referral    this is because you have been replacing losses with water and not electrolyte solution      2. Acute streptococcal pharyngitis  J02.0 Primary Care Referral    take penicillin twice daily for 10 days.  use ibuprofen and tylenol.        3. Diarrhea, unspecified type  R19.7 Primary Care  Referral      4. Tachycardia  R00.0     this appears due to dehydration.  You will neede to switch to oral electrolyte solution - pedialyte or similar from pharmacy.  replace every stool with equivalent rehydration solution and 64 ounces fluid per day.  you will need recheck tomorrow in clinic to recheck labs and exam      5. Influenza A  J10.1 Primary Care Referral    Tamiflu could be consiered for less than 2-3 days of symptoms. unlikely to help now and will cause vomiting      6. Hyperglycemia  R73.9 Primary Care Referral    Your blood sugar is high today.  stay hydrated.  avoid excessive simple sugar intake      7. Supraglottitis without airway obstruction  J04.30           Plan:    Pending studies: none.      Carefully monitor airway while awaiting bed availability for his admission.    4:24 PM - I reviewed the case with Dr. Oleary, ICU attending at [facility]. She accepted care of the patient in transfer there.   He is remained stable, is taking p.o., has no evidence of airway compromise and feels much improved.  He appears stable for transfer for his admission, no beds available here.    4:56 PM - he remains stable, no evidence of airway compromise, taking p.o.  Awaiting ambulance transport to [facility].  Repeat blood sugar 492, repeat venous blood gas shows no acidosis, repeat ketones were 1.9, increased from 1.2 earlier.    5:14 PM - I  reviewed the patient's hyperglycemia, with ketosis but not acidosis, Dr. Oleary, accepting physician. We elected to initiate subcutaneous insulin therapy and deferred insulin drip.   I reviewed the case with the pharmacist and regular insulin was recommended, 12 units subcutaneous.          Peter Bryan MD  11/21/22 [id number]

## 2022-11-21 NOTE — DISCHARGE INSTRUCTIONS
ICD-10-CM    1. Hyponatremia  E87.1     this is because you have been replacing losses with water and not electrolyte solution      2. Acute streptococcal pharyngitis  J02.0     take penicillin twice daily for 10 days      3. Diarrhea, unspecified type  R19.7       4. Tachycardia  R00.0     this appears due to dehydration.  You will neede to switch to oral electrolyte solution - pedialyte or similar from pharmacy.  replace every stool with equivalent rehydration solution and 64 ounces fluid per day.  you will need recheck tomorrow in clinic to recheck labs and exam      5. Influenza A  J10.1     Tamiflu could be consiered for less than 2-3 days of symptoms. unlikely to help now and will cause vomiting      6. Hyperglycemia  R73.9     Your blood sugar is high today.  stay hydrated.  avoid excessive simple sugar intake

## 2022-11-21 NOTE — ED PROVIDER NOTES
History     Chief Complaint   Patient presents with     Flu Symptoms     HPI  Ilya Villagran is a 52 year old male who presents with influenza-like illness.  Numerous episodes of diarrhea.  Cough congestion upper respiratory symptoms.  Pharyngitis that is more prominent today.  Fever.  Able to hold down fluids.  Type 2 diabetes    Lab Results   Component Value Date    HGB 14.8 11/21/2022    HGB 15.0 09/12/2018    HGB 14.6 04/25/2018         Allergies:  Allergies   Allergen Reactions     Ace Inhibitors Cough       Problem List:    Patient Active Problem List    Diagnosis Date Noted     Anxiety and depression 02/19/2020     Priority: Medium     Flexural eczema 09/28/2018     Priority: Medium     Chronic cough 09/28/2018     Priority: Medium     Type 2 diabetes mellitus without complication, without long-term current use of insulin (H) 01/12/2018     Priority: Medium     Hyperglycemia 07/20/2017     Priority: Medium     Essential hypertension 07/19/2017     Priority: Medium        Past Medical History:    Past Medical History:   Diagnosis Date     Type 2 diabetes mellitus without complication, without long-term current use of insulin (H) 1/12/2018       Past Surgical History:    Past Surgical History:   Procedure Laterality Date     LAPAROSCOPIC APPENDECTOMY  12/18/2011    Procedure:LAPAROSCOPIC APPENDECTOMY; Surgeon:JEAN CLAUDE LUNA; Location:[place] OR       Family History:    Family History   Problem Relation Age of Onset     Diabetes Mother      Asthma Mother      Depression Brother      Myocardial Infarction Brother      Hypertension Cousin        Social History:  Marital Status:  Single [1]  Social History     Tobacco Use     Smoking status: Never     Smokeless tobacco: Never   Vaping Use     Vaping Use: Never used   Substance Use Topics     Alcohol use: Yes     Comment: 12 pack per week     Drug use: Not Currently     Comment: used in the past for 15 yrs        Medications:    acetaminophen (TYLENOL) 500 MG  tablet  blood glucose (NO BRAND SPECIFIED) lancets standard  blood glucose monitoring (NO BRAND SPECIFIED) meter device kit  blood glucose monitoring (NO BRAND SPECIFIED) test strip  cyclobenzaprine (FLEXERIL) 5 MG tablet  losartan (COZAAR) 100 MG tablet  sertraline (ZOLOFT) 50 MG tablet          Review of Systems   ROS:  5 point ROS negative except as noted above in HPI, including Gen., Resp., CV, GI &  system review.      Physical Exam   BP: 136/80  Pulse: (!) 123  Temp: 97.7  F (36.5  C)  Resp: 20  Height: 182.9 cm (6')  Weight: 108.9 kg (240 lb)  SpO2: 96 %      Physical Exam  Constitutional:       General: He is in acute distress.      Appearance: He is not diaphoretic.   HENT:      Head: Atraumatic.   Eyes:      Conjunctiva/sclera: Conjunctivae normal.   Cardiovascular:      Rate and Rhythm: Tachycardia present.      Heart sounds: No murmur heard.  Pulmonary:      Effort: Pulmonary effort is normal. No respiratory distress.      Breath sounds: No stridor. Wheezing (occl) present. No rhonchi.   Abdominal:      General: Abdomen is flat. There is no distension.      Palpations: There is no mass.      Tenderness: There is no abdominal tenderness. There is no guarding.   Musculoskeletal:      Cervical back: Neck supple.      Right lower leg: No edema.      Left lower leg: No edema.   Skin:     Coloration: Skin is not pale.      Findings: No rash.   Neurological:      Mental Status: He is alert.      Motor: No weakness.        The throat is mildly erythematous especially at the uvula.  He has no obvious signs of peritonsillar abscess.       ED Course                 Procedures              Critical Care time:  none               Results for orders placed or performed during the hospital encounter of 11/21/22 (from the past 24 hour(s))   Symptomatic; Yes; 11/16/2022 Influenza A/B & SARS-CoV2 (COVID-19) Virus PCR Multiplex Nose    Specimen: Nose; Swab   Result Value Ref Range    Influenza A PCR Positive (A) Negative     Influenza B PCR Negative Negative    SARS CoV2 PCR Negative Negative    Narrative    Testing was performed using the [employer]enia SARS-CoV-2 & Influenza A/B Assay on the k[employer] System. This test should be ordered for the detection of SARS-CoV-2 and influenza viruses in individuals who meet clinical and/or epidemiological criteria. Test performance is unknown in asymptomatic patients. This test is for in vitro diagnostic use under the FDA EUA for laboratories certified under CLIA to perform moderate and/or high complexity testing. This test has not been FDA cleared or approved. A negative result does not rule out the presence of PCR inhibitors in the specimen or target RNA in concentration below the limit of detection for the assay. If only one viral target is positive but coinfection with multiple targets is suspected, the sample should be re-tested with another FDA cleared, approved or authorized test, if coinfection would change clinical management. [facility] are certified under the Clinical Laboratory Improvement Amendments of 1988 (CLIA-88) as qualified to perform moderate and/or high complexity laboratory testing.   Streptococcus A Rapid Scr w Reflx to PCR    Specimen: Throat; Swab   Result Value Ref Range    Group A Strep antigen Positive (A) Negative   CBC with platelets, differential    Narrative    The following orders were created for panel order CBC with platelets, differential.  Procedure                               Abnormality         Status                     ---------                               -----------         ------                     CBC with platelets and d...[[id number]]  Abnormal            Final result               Manual Differential[[id number]]          Abnormal            Final result                 Please view results for these tests on the individual orders.   Comprehensive metabolic panel   Result Value Ref Range    Sodium 127 (L) 136 - 145 mmol/L     Potassium 3.9 3.4 - 5.3 mmol/L    Chloride 86 (L) 98 - 107 mmol/L    Carbon Dioxide (CO2) 24 22 - 29 mmol/L    Anion Gap 17 (H) 7 - 15 mmol/L    Urea Nitrogen 28.0 (H) 6.0 - 20.0 mg/dL    Creatinine 1.05 0.67 - 1.17 mg/dL    Calcium 8.8 8.6 - 10.0 mg/dL    Glucose 427 (H) 70 - 99 mg/dL    Alkaline Phosphatase 69 40 - 129 U/L    AST 16 10 - 50 U/L    ALT 20 10 - 50 U/L    Protein Total 7.8 6.4 - 8.3 g/dL    Albumin 3.7 3.5 - 5.2 g/dL    Bilirubin Total 1.1 <=1.2 mg/dL    GFR Estimate 85 >60 mL/min/1.73m2   CBC with platelets and differential   Result Value Ref Range    WBC Count 5.4 4.0 - 11.0 10e3/uL    RBC Count 5.20 4.40 - 5.90 10e6/uL    Hemoglobin 14.8 13.3 - 17.7 g/dL    Hematocrit 41.3 40.0 - 53.0 %    MCV 79 78 - 100 fL    MCH 28.5 26.5 - 33.0 pg    MCHC 35.8 31.5 - 36.5 g/dL    RDW 12.7 10.0 - 15.0 %    Platelet Count 118 (L) 150 - 450 10e3/uL   Manual Differential   Result Value Ref Range    % Neutrophils 87 %    % Lymphocytes 7 %    % Monocytes 6 %    % Eosinophils 0 %    % Basophils 0 %    Absolute Neutrophils 4.7 1.6 - 8.3 10e3/uL    Absolute Lymphocytes 0.4 (L) 0.8 - 5.3 10e3/uL    Absolute Monocytes 0.3 0.0 - 1.3 10e3/uL    Absolute Eosinophils 0.0 0.0 - 0.7 10e3/uL    Absolute Basophils 0.0 0.0 - 0.2 10e3/uL    RBC Morphology Confirmed RBC Indices     Platelet Assessment  Automated Count Confirmed. Platelet morphology is normal.     Automated Count Confirmed. Platelet morphology is normal.   Blood gas venous   Result Value Ref Range    pH Venous 7.46 (H) 7.32 - 7.43    pCO2 Venous 29 (L) 40 - 50 mm Hg    pO2 Venous 57 (H) 25 - 47 mm Hg    Bicarbonate Venous 21 21 - 28 mmol/L    Base Excess/Deficit (+/-) -2.1 -7.7 - 1.9 mmol/L    FIO2 0    Ketone Beta-Hydroxybutyrate Quantitative   Result Value Ref Range    Ketone (Beta-Hydroxybutyrate) Quantitative 1.2 (H) 0.0 - 0.6 mmol/L   Soft tissue neck CT w contrast    Narrative    CT SCAN OF THE NECK WITH CONTRAST  11/21/2022 11:40 AM     HISTORY: Severe  pharyngitis, inability to swallow, strep, abnormal  nasolaryngoscopy - difficulty visualizing cords with soft tissue  swelling    TECHNIQUE: Axial CT images of the neck following administration of  intravenous contrast with reformations. Radiation dose for this scan  was reduced using automated exposure control, adjustment of the mA  and/or kV according to patient size, or iterative reconstruction  technique. 90mL [id number]-370 IV.     COMPARISON: None.     FINDINGS: Exam is mildly limited by motion-related artifact. There is  moderate hyperattenuating soft tissue swelling involving the mid to  lower aspect of the left oropharyngeal wall, the left and paramedian  aspects of the epiglottis, the left aryepiglottic fold, the left  piriform sinus, and the left false vocal fold with associated fat  stranding in the left paraglottic fat as well as mild fat stranding in  the left parapharyngeal space, and effacement of the normal fat plane  along the lateral margin of the left thyrohyoid membrane with some  mild fat stranding at that location. No definite drainable fluid  collection/abscess is identified. There is associated narrowing of the  supraglottic laryngeal airway. The true vocal folds are difficult to  evaluate due to soft tissue swelling involving the supraglottic  larynx. The subglottic larynx and visualized trachea appear normal.    The visualized aspects of the orbits appear normal. The visualized  intracranial contents appear unremarkable. Mild to moderate mucosal  thickening in the bilateral maxillary sinuses and to a lesser extent  in the other paranasal sinuses with some layering fluid in the  bilateral maxillary sinuses (right greater than left) and also  possibly in the sphenoid sinuses bilaterally. The  spaces  appear unremarkable. No gross abnormality of the parotid glands or  submandibular glands. The thyroid gland appears unremarkable on CT.  Mildly prominent upper cervical lymph nodes, likely  reactive. No  definite pathologic cervical lymphadenopathy by size or morphology  criteria. No cystic/necrotic lymph nodes are seen.    Mild left carotid bifurcation atherosclerosis. The cervical carotid  and vertebral arteries appear grossly patent. Multiple partially  visualized irregular nonspecific soft tissue nodules in the upper lung  zones bilaterally (series 2 images 120-127) measuring up to at least  23-24 mm in axial plane. Multilevel degenerative changes noted in the  cervical spine.      Impression    IMPRESSION:  1. Soft tissue swelling/edema primarily involving the left aspect of  the oropharynx/hypopharynx and supraglottic larynx, as described.  Findings are concerning for pharyngitis/supraglottitis given the  patient's history. No definite drainable fluid collection/abscess  identified.  2. Multiple indeterminate irregular noncalcified soft tissue nodules  in the upper pulmonary lobes bilaterally. These may be  infectious/inflammatory, but neoplastic disease is not entirely  excluded. Recommend short interval follow-up chest CT.    LASHAWN TREVINO MD         SYSTEM ID:  [id number]   Glucose by meter   Result Value Ref Range    GLUCOSE BY METER POCT 492 (H) 70 - 99 mg/dL   Blood gas venous   Result Value Ref Range    pH Venous 7.40 7.32 - 7.43    pCO2 Venous 34 (L) 40 - 50 mm Hg    pO2 Venous 43 25 - 47 mm Hg    Bicarbonate Venous 21 21 - 28 mmol/L    Base Excess/Deficit (+/-) -2.9 -7.7 - 1.9 mmol/L    FIO2 21    Ketone Beta-Hydroxybutyrate Quantitative   Result Value Ref Range    Ketone (Beta-Hydroxybutyrate) Quantitative 1.9 (HH) 0.0 - 0.6 mmol/L       Medications - No data to display           EKG Interpretation:      Interpreted by Claude Ramesh MD  EKG done at 0709 hrs. demonstrates a sinus rhythm at 120 bpm normal axis.  There is no ST change.  No obvious T wave changes.  Rapid R progression associate with right bundle branch block that is old from 2018.  No ectopy.  Normal intervals with  exception of a QRS of 150.  Impression sinus tachycardia 120 bpm right bundle branch block old              Assessments & Plan (with Medical Decision Making)     MDM: Ilya Villagran is a 52 year old male who presents with severe pharyngitis onset the last couple days.  Also influenza a positive.  Diabetes mellitus.   Arrived tachycardic.   Had plan for early discharge but the patient had significant pain tachycardia and so fluids were initiated.  During his stay he complained of significant refractory pharyngitis.  Was given Toradol and fluids.  On my reexam he felt that he could not even drink water.  Placed a nasal laryngoscope.  On this evaluation I could not visualize vocal cords.  There was significant soft tissue swelling in the airway.  Call placed to ENT.  Currently oxygen saturations normal.  Respiratory rate normal.  Discussed with the patient.  We will need to pursue CT.  We will pursue IV analgesics.  Decadron IV, Unasyn IV.    Spoke with Dr. Lee -who performed nasal laryngoscopy.  I was there for the scope.  Could visualize only though right vocal cord as the left cord was obscured by swelling of the lateral pharynx on the left.  This was also obscuring part of the arytenoids.  There was mild inflammation of the epiglottis.      At this point patient's airway appears to be stable.  Recommended for ICU stay.  No indication at this point for intubation.  Analgesics ordered.  Continued IV fluids.  Discussed antibiotic management with Dr. Lee and recommends continuing with the Unasyn.  No indication at this point for vancomycin.  Signed out the patient to Dr. Bryan pending hospital bed availability at ICU here or at other facility    I have reviewed the nursing notes.    I have reviewed the findings, diagnosis, plan and need for follow up with the patient.       New Prescriptions    No medications on file       Final diagnoses:   Hyponatremia - this is because you have been replacing losses with  water and not electrolyte solution   Acute streptococcal pharyngitis - take penicillin twice daily for 10 days.  use ibuprofen and tylenol.     Diarrhea, unspecified type   Tachycardia - this appears due to dehydration.  You will neede to switch to oral electrolyte solution - pedialyte or similar from pharmacy.  replace every stool with equivalent rehydration solution and 64 ounces fluid per day.  you will need recheck tomorrow in clinic to recheck labs and exam   Influenza A - Tamiflu could be consiered for less than 2-3 days of symptoms. unlikely to help now and will cause vomiting   Hyperglycemia - Your blood sugar is high today.  stay hydrated.  avoid excessive simple sugar intake   Supraglottitis without airway obstruction       11/21/2022   [facility] EMERGENCY DEPT     Claude Ramesh MD  11/21/22 [id number]

## 2022-11-21 NOTE — CONSULTS
Chief Complaint   Patient presents with     Flu Symptoms     History of Present Illness  Ilya Villagran is a 52 year old male with a 3 to 4-day history of progressive sore throat, fever, chills.  Now with trouble swallowing.  He is having some noisy breathing and shortness of breath.  He does still have his tonsils.  He has not had similar episodes in the past.  Family members are influenza A positive.  He did test positive for influenza A and group A strep.    Patient underwent a CT scan of the neck with contrast earlier today.  My review of the neck CT with contrast shows some soft tissue swelling and edema of the left oropharynx extending into the supraglottic larynx and epiglottis.  This is consistent with supraglottitis/epiglottitis.  There is some reactive lymphadenopathy.  No obvious surgically drainable fluid collection.    Past Medical History  Patient Active Problem List   Diagnosis     Essential hypertension     Hyperglycemia     Type 2 diabetes mellitus without complication, without long-term current use of insulin (H)     Flexural eczema     Chronic cough     Anxiety and depression     Current Medications    Current Facility-Administered Medications:      HYDROmorphone (PF) (DILAUDID) injection 0.5 mg, 0.5 mg, Intravenous, Q1H PRN, Claude Ramesh MD, 0.5 mg at 11/21/22 1152     lidocaine 4 % injection 5 mL, 5 mL, Other, Once, Claude Ramesh MD     oxymetazoline (AFRIN) 0.05 % spray 2 spray, 2 spray, Both Nostrils, BID, Claude Ramesh MD     racEPINEPHrine neb solution 0.5 mL, 0.5 mL, Nebulization, Q3H PRN, Claude Ramesh MD     [COMPLETED] 0.9% sodium chloride BOLUS, 1,000 mL, Intravenous, Once, Stopped at 11/21/22 1205 **FOLLOWED BY** sodium chloride 0.9% infusion, , Intravenous, Continuous, Claude Ramesh MD     sodium chloride 0.9% infusion, 1,000 mL, Intravenous, Continuous, Claude Ramesh MD, Last Rate: 125 mL/hr at 11/21/22 1205, 1,000 mL at 11/21/22 1205    Current Outpatient Medications:       magic mouthwash suspension, diphenhydrAMINE, lidocaine, aluminum-magnesium & simethicone, (FIRST-MOUTHWASH BLM) compounding kit, Swish and swallow 5-10 mLs in mouth every 6 hours as needed for mouth sores, Disp: 237 mL, Rfl: 0     ondansetron (ZOFRAN ODT) 4 MG ODT tab, Take 1 tablet (4 mg) by mouth every 8 hours as needed for nausea, Disp: 10 tablet, Rfl: 0     penicillin V (VEETID) 500 MG tablet, Take 1 tablet (500 mg) by mouth 2 times daily for 10 days, Disp: 30 tablet, Rfl: 0     acetaminophen (TYLENOL) 500 MG tablet, Take 1-2 tablets (500-1,000 mg) by mouth every 6 hours as needed for mild pain, Disp: , Rfl:      blood glucose (NO BRAND SPECIFIED) lancets standard, Use to test blood sugar 1 times daily or as directed., Disp: 100 each, Rfl: 11     blood glucose monitoring (NO BRAND SPECIFIED) meter device kit, Use to test blood sugar 1 times daily or as directed., Disp: 1 kit, Rfl: 0     blood glucose monitoring (NO BRAND SPECIFIED) test strip, Use to test blood sugars 1 times daily or as directed, Disp: 100 strip, Rfl: 3     cyclobenzaprine (FLEXERIL) 5 MG tablet, Take 1 tablet (5 mg) by mouth 3 times daily as needed for muscle spasms, Disp: 30 tablet, Rfl: 0     losartan (COZAAR) 100 MG tablet, Take 1 tablet (100 mg) by mouth daily, Disp: 90 tablet, Rfl: 3     sertraline (ZOLOFT) 50 MG tablet, Take 1 tablet (50 mg) by mouth daily, Disp: 90 tablet, Rfl: 3    Allergies  Allergies   Allergen Reactions     Ace Inhibitors Cough       Social History  Social History     Socioeconomic History     Marital status: Single   Occupational History     Occupation: /customer service     Employer: [employer]   Tobacco Use     Smoking status: Never     Smokeless tobacco: Never   Vaping Use     Vaping Use: Never used   Substance and Sexual Activity     Alcohol use: Yes     Comment: 12 pack per week     Drug use: Not Currently     Comment: used in the past for 15 yrs     Sexual activity: Not Currently    Other Topics Concern     Parent/sibling w/ CABG, MI or angioplasty before 65F 55M? No   Social History Narrative    August 28, 2017    ENVIRONMENTAL HISTORY: [place] The home is heated with a forced air. They do have central air conditioning. The patient's bedroom is furnished with carpeting in bedroom and fabric window coverings.  Pets inside the house include 2 cats, 1 dog and 1 piglet. There is not history of cockroach or mice infestation. There is 1 smoker in the house, only smokes outside.  The house does not have a damp basement.        Family History  Family History   Problem Relation Age of Onset     Diabetes Mother      Asthma Mother      Depression Brother      Myocardial Infarction Brother      Hypertension Cousin        Review of Systems  As per HPI and PMHx, otherwise 10 system review including the head and neck, constitutional, eyes, respiratory, GI, skin, neurologic, lymphatic, endocrine, and allergy systems is negative.    Physical Exam  BP (!) 163/83   Pulse 103   Temp (!) 100.5  F (38.1  C) (Oral)   Resp 20   Ht 1.829 m (6')   Wt 108.9 kg (240 lb)   SpO2 95%   BMI 32.55 kg/m    GENERAL: Patient is a pleasant, cooperative 52 year old male in no acute distress.  HEAD: Normocephalic, atraumatic.  Hair and scalp are normal.  EYES: Pupils are equal, round, reactive to light and accommodation.  Extraocular movements are intact.  The sclera nonicteric without injection.  The extraocular structures are normal.  EARS: Normal shape and symmetry.  No tenderness when palpating the mastoid or tragal areas bilaterally.  NOSE: Nares are patent.  Nasal mucosa is boggy and inflamed to sticky, inflammatory mucus.  ORAL CAVITY: Dentition is in reasonably good repair.  Mucous membranes are moist.  Tongue is mobile, protrudes to the midline.  Palate elevates symmetrically.  Tonsils are 2+, symmetric.  Mild erythema.  No exudate.  No oral cavity or  oropharyngeal masses, lesions, ulcerations, leukoplakia.  NECK: Supple, trachea is midline.  There no palpable cervical lymphadenopathy or masses bilaterally.  Palpation of the bilateral parotid and submandibular areas reveal no masses.  No thyromegaly.    NEUROLOGIC: Cranial nerves II through XII are grossly intact.  Voice is strong.  Patient is House-Brackmann I/VI bilaterally.  CARDIOVASCULAR: Extremities are warm and well-perfused.  No significant peripheral edema.  RESPIRATORY: Patient has nonlabored breathing without cough, wheeze, stridor.  PSYCHIATRIC: Patient is alert and oriented.  Mood and affect appear normal.  SKIN: Warm and dry.  No scalp, face, or neck lesions noted.    Procedure: Flexible Laryngoscopy  Indication: Dysphagia, stridor, strep and influenza A positive    To best visualize the upper airway anatomy and due to the chief complaint and HPI, I proceeded with flexible fiberoptic laryngoscopy examination.  The bilateral nasal cavities were anesthetized and decongested with a mixture of lidocaine and neosynephrine.  The bilateral nasal cavities were examined using a flexible fiberoptic laryngoscope.  There were no nasal cavity masses, polyps, or mucopurulence bilaterally.  The nasopharynx had a normal appearance with normal Eustachian tube openings and fossa of Rosenmuller bilaterally.  Minimal adenoid tissue.  There is some swelling of the left lateral oropharynx extending into the epiglottis, aryepiglottic fold, and arytenoid on the left.  This obscures the visualization of the left true vocal fold.  The right epiglottis, aryepiglottic fold, and arytenoids are within normal limits.  Slight pooling of secretions.  The patient has good abduction and adduction of the vocal folds.  The right true vocal fold has no obvious masses or lesions.  Infraglottic airway appears normal.  The scope was removed.  The patient tolerated the procedure well.                  Assessment and Plan   #1  Supraglottitis/epiglottitis  #2 Strep pharyngitis  #3 Influenza A    Ilya Villagran was evaluated today in the emergency department.  The patient has signs and symptoms of supraglottitis/epiglottitis both on endoscopic exam and on his CT imaging.  He is received IV steroids.  We discussed racemic epi to help with upper airway swelling.  He was started on Unasyn.  I would recommend admission to the ICU for respiratory monitoring.    No current clinical indication for advanced airway.    Ilya to follow up with Primary Care provider regarding elevated blood pressure.    Hiro Haynes MD  Department of Otolaryngology-Head and Neck Surgery  [facility]

## 2022-11-21 NOTE — ED TRIAGE NOTES
"Pt complains nausea, diarrhea, fever (around 100), sore throat and feels \"throat is out of place.\" Everyone in the house is ill.      Triage Assessment     Row Name 11/21/22 [id number]       Triage Assessment (Adult)    Airway WDL WDL       Respiratory WDL    Respiratory WDL WDL       Skin Circulation/Temperature WDL    Skin Circulation/Temperature WDL WDL       Cardiac WDL    Cardiac WDL WDL       Peripheral/Neurovascular WDL    Peripheral Neurovascular WDL WDL       Cognitive/Neuro/Behavioral WDL    Cognitive/Neuro/Behavioral WDL WDL              "

## 2022-11-22 PROBLEM — E11.10: Status: ACTIVE | Noted: 2022-11-22

## 2022-11-22 LAB
ANION GAP SERPL CALCULATED.3IONS-SCNC: 8 MMOL/L (ref 3–14)
BUN SERPL-MCNC: 23 MG/DL (ref 7–30)
CALCIUM SERPL-MCNC: 7.8 MG/DL (ref 8.5–10.1)
CHLORIDE BLD-SCNC: 102 MMOL/L (ref 94–109)
CO2 SERPL-SCNC: 24 MMOL/L (ref 20–32)
CREAT SERPL-MCNC: 0.59 MG/DL (ref 0.66–1.25)
ERYTHROCYTE [DISTWIDTH] IN BLOOD BY AUTOMATED COUNT: 13 % (ref 10–15)
GFR SERPL CREATININE-BSD FRML MDRD: >90 ML/MIN/1.73M2
GLUCOSE BLD-MCNC: 277 MG/DL (ref 70–99)
GLUCOSE BLDC GLUCOMTR-MCNC: 308 MG/DL (ref 70–99)
GLUCOSE BLDC GLUCOMTR-MCNC: 310 MG/DL (ref 70–99)
GLUCOSE BLDC GLUCOMTR-MCNC: 320 MG/DL (ref 70–99)
GLUCOSE BLDC GLUCOMTR-MCNC: 348 MG/DL (ref 70–99)
GLUCOSE BLDC GLUCOMTR-MCNC: 400 MG/DL (ref 70–99)
HBA1C MFR BLD: 8.9 % (ref 0–5.6)
HCT VFR BLD AUTO: 34.7 % (ref 40–53)
HGB BLD-MCNC: 12.3 G/DL (ref 13.3–17.7)
KETONES BLD-SCNC: 0.7 MMOL/L (ref 0–0.6)
KETONES BLD-SCNC: 1.5 MMOL/L (ref 0–0.6)
MCH RBC QN AUTO: 28.9 PG (ref 26.5–33)
MCHC RBC AUTO-ENTMCNC: 35.4 G/DL (ref 31.5–36.5)
MCV RBC AUTO: 82 FL (ref 78–100)
PLATELET # BLD AUTO: 118 10E3/UL (ref 150–450)
POTASSIUM BLD-SCNC: 3.8 MMOL/L (ref 3.4–5.3)
RBC # BLD AUTO: 4.26 10E6/UL (ref 4.4–5.9)
SODIUM SERPL-SCNC: 134 MMOL/L (ref 133–144)
WBC # BLD AUTO: 6.6 10E3/UL (ref 4–11)

## 2022-11-22 PROCEDURE — 36415 COLL VENOUS BLD VENIPUNCTURE: CPT | Performed by: FAMILY MEDICINE

## 2022-11-22 PROCEDURE — 250N000011 HC RX IP 250 OP 636: Performed by: INTERNAL MEDICINE

## 2022-11-22 PROCEDURE — 83036 HEMOGLOBIN GLYCOSYLATED A1C: CPT | Performed by: INTERNAL MEDICINE

## 2022-11-22 PROCEDURE — 250N000013 HC RX MED GY IP 250 OP 250 PS 637: Performed by: FAMILY MEDICINE

## 2022-11-22 PROCEDURE — 82310 ASSAY OF CALCIUM: CPT | Performed by: INTERNAL MEDICINE

## 2022-11-22 PROCEDURE — 250N000013 HC RX MED GY IP 250 OP 250 PS 637: Performed by: INTERNAL MEDICINE

## 2022-11-22 PROCEDURE — 82010 KETONE BODYS QUAN: CPT | Performed by: FAMILY MEDICINE

## 2022-11-22 PROCEDURE — 120N000001 HC R&B MED SURG/OB

## 2022-11-22 PROCEDURE — 36415 COLL VENOUS BLD VENIPUNCTURE: CPT | Performed by: INTERNAL MEDICINE

## 2022-11-22 PROCEDURE — 0CJS8ZZ INSPECTION OF LARYNX, VIA NATURAL OR ARTIFICIAL OPENING ENDOSCOPIC: ICD-10-PCS | Performed by: OTOLARYNGOLOGY

## 2022-11-22 PROCEDURE — 85027 COMPLETE CBC AUTOMATED: CPT | Performed by: INTERNAL MEDICINE

## 2022-11-22 PROCEDURE — 258N000003 HC RX IP 258 OP 636: Performed by: INTERNAL MEDICINE

## 2022-11-22 PROCEDURE — 99233 SBSQ HOSP IP/OBS HIGH 50: CPT | Performed by: FAMILY MEDICINE

## 2022-11-22 RX ORDER — DEXAMETHASONE SODIUM PHOSPHATE 10 MG/ML
6 INJECTION, SOLUTION INTRAMUSCULAR; INTRAVENOUS EVERY 12 HOURS
Status: DISCONTINUED | OUTPATIENT
Start: 2022-11-23 | End: 2022-11-23

## 2022-11-22 RX ADMIN — DEXAMETHASONE SODIUM PHOSPHATE 6 MG: 10 INJECTION, SOLUTION INTRAMUSCULAR; INTRAVENOUS at 15:32

## 2022-11-22 RX ADMIN — OSELTAMIVIR PHOSPHATE 75 MG: 6 POWDER, FOR SUSPENSION ORAL at 09:26

## 2022-11-22 RX ADMIN — SODIUM CHLORIDE: 9 INJECTION, SOLUTION INTRAVENOUS at 09:26

## 2022-11-22 RX ADMIN — METFORMIN HYDROCHLORIDE 1000 MG: 500 TABLET ORAL at 18:16

## 2022-11-22 RX ADMIN — AMPICILLIN SODIUM AND SULBACTAM SODIUM 3 G: 2; 1 INJECTION, POWDER, FOR SOLUTION INTRAMUSCULAR; INTRAVENOUS at 12:08

## 2022-11-22 RX ADMIN — ENOXAPARIN SODIUM 40 MG: 40 INJECTION SUBCUTANEOUS at 21:40

## 2022-11-22 RX ADMIN — GUAIFENESIN AND CODEINE PHOSPHATE 5 ML: 10; 100 LIQUID ORAL at 17:35

## 2022-11-22 RX ADMIN — OSELTAMIVIR PHOSPHATE 75 MG: 6 POWDER, FOR SUSPENSION ORAL at 22:15

## 2022-11-22 RX ADMIN — AMPICILLIN SODIUM AND SULBACTAM SODIUM 3 G: 2; 1 INJECTION, POWDER, FOR SOLUTION INTRAMUSCULAR; INTRAVENOUS at 06:50

## 2022-11-22 RX ADMIN — SERTRALINE HYDROCHLORIDE 50 MG: 50 TABLET ORAL at 09:26

## 2022-11-22 RX ADMIN — AMPICILLIN SODIUM AND SULBACTAM SODIUM 3 G: 2; 1 INJECTION, POWDER, FOR SOLUTION INTRAMUSCULAR; INTRAVENOUS at 17:35

## 2022-11-22 RX ADMIN — DEXAMETHASONE SODIUM PHOSPHATE 6 MG: 10 INJECTION, SOLUTION INTRAMUSCULAR; INTRAVENOUS at 06:50

## 2022-11-22 ASSESSMENT — ACTIVITIES OF DAILY LIVING (ADL)
ADLS_ACUITY_SCORE: 20
WEAR_GLASSES_OR_BLIND: NO
FALL_HISTORY_WITHIN_LAST_SIX_MONTHS: NO
DIFFICULTY_EATING/SWALLOWING: NO
WALKING_OR_CLIMBING_STAIRS_DIFFICULTY: NO
ADLS_ACUITY_SCORE: 20
CHANGE_IN_FUNCTIONAL_STATUS_SINCE_ONSET_OF_CURRENT_ILLNESS/INJURY: NO
ADLS_ACUITY_SCORE: 20
TOILETING_ISSUES: NO
DRESSING/BATHING_DIFFICULTY: NO
ADLS_ACUITY_SCORE: 20
CONCENTRATING,_REMEMBERING_OR_MAKING_DECISIONS_DIFFICULTY: NO
ADLS_ACUITY_SCORE: 20
DIFFICULTY_COMMUNICATING: NO
ADLS_ACUITY_SCORE: 20
DOING_ERRANDS_INDEPENDENTLY_DIFFICULTY: NO
ADLS_ACUITY_SCORE: 33

## 2022-11-22 NOTE — PROGRESS NOTES
[facility]    Medicine Progress Note - Hospitalist Service    Date of Admission:  11/21/2022    Assessment & Plan    Patient is a 52-year-old gentleman who presents with influenza A, strep throat, and epiglottitis and was also found to have persistent hyperglycemia with diabetic ketosis likely secondary to inadequate oral intake caused by acute illness was hospitalized for close monitoring of his respiratory status given epiglottitis severity and improvement of his blood sugar control.  Patient has had some clinical improvement overnight and is able to swallow more easily without signs of respiratory distress.  ENT is planning on seeing this afternoon to assist in further medication recommendations and follow-up from an epiglottitis standpoint.    Principal Problem:    Epiglottitis    Assessment: Noted on imaging and confirmed with flex laryngoscopy by ENT on 11/21.  On Decadron and Unasyn with clinical improvement noted overnight and patient is now able to swallow clear liquids without difficulty    Plan:   -Discussed with Dr. Manzano, ENT specialist, who will proceed with repeat next laryngeal scope evaluation this afternoon  -Continue Decadron 6 mg every 8 hours and Unasyn every 6 hours  -Okay to advance to full liquid diet for lunch    Active Problems:    Acute streptococcal pharyngitis    Assessment: On Unasyn    Plan:   -Continue for now, anticipate transition to oral antibiotics going forward as patient continues to clinically improve      Influenza A    Assessment: Present at time of admission with family members also ill with same illness    Plan:   -Continue Tamiflu for 5 days  -Ongoing contact precautions      Uncontrolled type 2 diabetes mellitus with hyperglycemia (H)    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  He did have severe hyperglycemia and diabetic ketosis  but no acidosis at time of hospitalization and following steroid administration has continue to to have persistent hyperglycemia.  Patient is very resistant to starting any oral medication or insulin as he feels that he will be able to get his blood sugars under control with diabetic diet alone.  He is okay with short acting NovoLog at this time.  Ketones are still elevated at 1.5 this morning    Plan:   -Continue low carbohydrate diet  -Will advance sliding scale NovoLog to high resistance protocol  -Recheck ketones later today and if still elevated with ongoing hyperglycemia will need to revisit doing something more aggressive for improved diabetic control to reduce patient's risk for acidosis development  -Hopefully will be able to titrate down on the steroids following ENT evaluation this afternoon which should help blood sugar control      Hyponatremia    Assessment: Caused by hyperglycemia and normal when corrected for this    Plan: Continue to monitor for ongoing resolution      Essential hypertension    Assessment: On losartan at baseline which has been on hold since time of admission with blood pressures in the normal range    Plan: Continue to monitor and restart if persistent hypertension redevelops.       Diet: Combination Diet Full Liquid; Low Consistent Carb (45 g CHO per Meal) Diet    DVT Prophylaxis: Enoxaparin (Lovenox) SQ  Bar Catheter: Not present  Central Lines: None  Cardiac Monitoring: None  Code Status: Full Code      Disposition Plan      Expected Discharge Date: 11/23/2022                The patient's care was discussed with the Bedside Nurse, Patient and Dr. Manzano, ENT Consultant.    Cathy Oleary MD  Hospitalist Service  [facility]  Securely message with the Vocera Web Console (learn more here)  Text page via [employer] Paging/Directory         Clinically Significant Risk Factors Present on Admission         # Hyponatremia: Lowest Na = 127  mmol/L (Ref range: 136-145) in last 2 days, will monitor as appropriate  # Hypocalcemia: Lowest Ca = 7.8 mg/dL (Ref range: 8.5 - 10.1 mg/dL) in last 2 days, will monitor and replace as appropriate       # Thrombocytopenia: Lowest platelets = 118 (Ref range: 150-450) in last 2 days, will monitor for bleeding   # Hypertension: home medication list includes antihypertensive(s)   # Acute Respiratory Failure: Documented O2 saturation < 91%.  Continue supplemental oxygen as needed   # DMII: A1C = 8.9 % (Ref range: 0.0 - 5.6 %) within past 3 months    # Obesity: Estimated body mass index is 31.6 kg/m  as calculated from the following:    Height as of this encounter: 1.829 m (6').    Weight as of this encounter: 105.7 kg (233 lb).           ______________________________________________________________________    Interval History   Patient reports feeling improvement in his throat discomfort and he is able to swallow more easily as the pain has improved.  He denies any new symptoms and is wanting more to eat.  He is unconcerned about his high blood sugars and feels that eating the right diet could fix his issues going forward as he has in the past.  No new nursing concerns    Data reviewed today: I reviewed all medications, new labs and imaging results over the last 24 hours.    Physical Exam   Vital Signs: Temp: 98.4  F (36.9  C) Temp src: Oral BP: 113/76 Pulse: 98   Resp: 18 SpO2: 97 % O2 Device: None (Room air) Oxygen Delivery: 1 LPM  Weight: 233 lbs 0 oz  Constitutional: awake, alert, cooperative, no apparent distress, and appears stated age  Respiratory: No increased work of breathing, good air exchange, clear to auscultation bilaterally, no crackles or wheezing  Cardiovascular: Regular rate and rhythm without murmur  GI: Bowel sounds present, abdomen is obese but soft, nondistended, nontender  Skin: no redness, warmth, or swelling and no rashes  Musculoskeletal: no lower extremity pitting edema present  Neurologic:  Awake, alert, oriented to name, place and overall to situation    Data   Recent Labs   Lab 11/22/22  2100 11/22/22  1720 11/22/22  1207 11/22/22  0748 11/22/22  0536 11/21/22  1645 11/21/22  0526   WBC  --   --   --   --  6.6  --  5.4   HGB  --   --   --   --  12.3*  --  14.8   MCV  --   --   --   --  82  --  79   PLT  --   --   --   --  118*  --  118*   NA  --   --   --   --  134  --  127*   POTASSIUM  --   --   --   --  3.8  --  3.9   CHLORIDE  --   --   --   --  102  --  86*   CO2  --   --   --   --  24  --  24   BUN  --   --   --   --  23  --  28.0*   CR  --   --   --   --  0.59*  --  1.05   ANIONGAP  --   --   --   --  8  --  17*   JEANNIE  --   --   --   --  7.8*  --  8.8   * 400* 310*   < > 277*   < > 427*   ALBUMIN  --   --   --   --   --   --  3.7   PROTTOTAL  --   --   --   --   --   --  7.8   BILITOTAL  --   --   --   --   --   --  1.1   ALKPHOS  --   --   --   --   --   --  69   ALT  --   --   --   --   --   --  20   AST  --   --   --   --   --   --  16    < > = values in this interval not displayed.

## 2022-11-22 NOTE — PROVIDER NOTIFICATION
Blood glucose 400, Provider notified, was discussed rechecking ketones, no order present.  Patient requesting advancement to regular diet.  Provider in to see patient and discuss plan of care.  Jefferson Minaya RN

## 2022-11-22 NOTE — PROGRESS NOTES
Patient has had significant improvement in the swelling on repeat flex laryngoscope and is okay to advance diet further as tolerated with ongoing IV Decadron and Unasyn this evening with plans to transition to oral steroids and antibiotics tomorrow if he continues to improve.  Blood sugars continue to be persistently elevated in the 300-400 range despite high resistance NovoLog and low carbohydrate diet.  Discussed extensively with patient the importance of improved blood sugar control and patient is willing to start metformin in addition to sliding scale NovoLog and this will be initiated this evening.  We will recheck ketones again tomorrow morning    Electronically Signed:  Cathy Oleary MD

## 2022-11-22 NOTE — PLAN OF CARE
"Goal Outcome Evaluation:                 Outcome Evaluation: Patient A&Ox4. VSS. Pain mild in throat. Tolerating liquids without difficulty. Scope performed at bedside per Dr. Manzano, states resolving.  Plan to continue decadron thru tomorrow and anticipate change to po antibiotics and discharge 11/23. Patient with known sleep apnea as he used to wear a C-pap about 5 years ago, just stopped \"didn't think I needed it anymore\".  Education given on cpap as well as diabetes control and importance. BG's in 300's, sliding scale insulin adjusted/increased. Good intake. Voiding frequently. Some diarrhea today.  room air. Patient transferred to [facility] via ambulation with all belongings with him.  In droplet precautions. Writer to continue care till 1930.  Jefferson Minaya RN        "

## 2022-11-22 NOTE — ASSESSMENT & PLAN NOTE
Due to steroid use  Insulin sliding scale. IV fluids. Consider long acting insulin, if his glucose remains elevated.  Diabetic diet.    It was diet controlled at home.  He was drinking apple juice and other liquids for couple of days due to dysphagia.  Check Hg A1c.

## 2022-11-22 NOTE — CONSULTS
I was consulted to see the patient who was being observed in the ICU after having been transferred from [facility] yesterday.  She came in with acute presentation of difficulty swallowing some shortness of breath severe sore throat.  Fiberoptic exam was performed by Dr. Haynes who found significant edema of the left aryepiglottic fold extending towards the epiglottis.  He was diagnosed with a supraglottitis early epiglottitis started on Unasyn intravenous steroids and transferred to [facility] where ICU beds were available for observation.  Since then he has significantly improved.  He exhibited no respiratory distress and was able to swallow without any difficulty.  He does endorse some difficulty swallowing solids at times that she will get stuck but has been ongoing for many years.  He denies any other cervical issues or any other dyspnea issues.  On exam he appears to be in no significant distress today normocephalic/atraumatic afebrile vital signs stable.      Neck exam is without any masses or lymphadenopathy  Oral cavity mucosa clear no evidence of pathology  Nasal exam shows some edema of the inferior turbinates with some clear secretions somewhat overproduction of those.  Fiberoptic laryngoscopy was performed after the patient stopped anesthetized with Xylocaine decongestant Anjum-Synephrine intranasally.  A fiberoptic scope was see for production of intranasal secretions filling the nasopharynx mostly clear to white.  Base of tongue appears to be clear.  Vallecula is clear.  Both surface of the epiglottis laryngeal and lingual are clear.  True cords appear to be straight and mobile with patent glottic airway.  Laboratory edge of the cords is clear.  Left aryepiglottic fold is still a little edematous but certainly significantly improved from prior appearance within 24 hours.    Impression supraglottitis resolving.  Patient will be sent to floor for further observation.  If tomorrow he  has no issues he probably can be discharged home with oral Augmentin for another week and then the Medrol Dosepak taper.  He should follow-up with  at [facility] which is much closer to his house in the next few weeks.  Considering some long term issues with swallowing solids he may benefit from a video swallow study that will be ordered at [facility].

## 2022-11-22 NOTE — ASSESSMENT & PLAN NOTE
Decadron.   He is on Unasyn  He underwent flex laryngoscopy by ENT yesterday in the [facility] ER  Consult ENT at [facility]  Clear liquid diet

## 2022-11-22 NOTE — H&P
[facility]    History and Physical - Hospitalist Service       Date of Admission:  11/21/2022    Assessment & Plan    Epiglottitis  Decadron.   He is on Unasyn  He underwent flex laryngoscopy by ENT yesterday in the [facility] ER  Consult ENT at [facility] facility  Clear liquid diet    Acute streptococcal pharyngitis  Unasyn    Influenza A  Tamiflu for 5 days  Droplet precautions    Uncontrolled type 2 diabetes mellitus with hyperglycemia (H)  Due to steroid use  Add low dose Lantus. Insulin sliding scale. IV fluids. Consider long acting insulin, if his glucose remains elevated.  Diabetic diet.    It was diet controlled at home.  He was drinking apple juice and other liquids for couple of days due to dysphagia.  Check Hg A1c.    Hyponatremia  Due to uncontrolled diabetes  Asymptomatic.    Essential hypertension  Hold Losartan.  PRN antihypertensive meds.                  Diet: Combination Diet Clear Liquid; Low Consistent Carb (45 g CHO per Meal) Diet    DVT Prophylaxis: Enoxaparin (Lovenox) SQ  Bar Catheter: Not present  Central Lines: None  Cardiac Monitoring: None  Code Status: Full Code      Clinically Significant Risk Factors Present on Admission         # Hyponatremia: Lowest Na = 127 mmol/L (Ref range: 136-145) in last 2 days, will monitor as appropriate        # Thrombocytopenia: Lowest platelets = 118 (Ref range: 150-450) in last 2 days, will monitor for bleeding   # Hypertension: home medication list includes antihypertensive(s)     # Obesity: Estimated body mass index is 31.6 kg/m  as calculated from the following:    Height as of this encounter: 1.829 m (6').    Weight as of this encounter: 105.7 kg (233 lb).           Disposition Plan      Expected Discharge Date: 11/23/2022                    Yasoda Meesala, MD  Hospitalist Service  [facility]  Securely message with the Vocera Web Console (learn more here)  Text page via [employer]  Paging/Directory         ______________________________________________________________________    Chief Complaint   sorethroat    History is obtained from the patient    History of Present Illness KAMLESH Villagran is a 52-year-old male with history of diet-controlled Diabetes presented with 3 to 4-day history of progressive sore throat, fever, chills.  He has dysphagia, mild stridor and shortness of breath. He is able to take only liquids for couple of days.  He has severe dry cough with associated vomiting.  He did test positive for Influenza A and group A strep A screen. His family members are influenza A positive. The CT scan of the neck with contrast showed soft tissue swelling and edema of the left oropharynx extending into the supraglottic larynx and epiglottis.  This is consistent with supraglottitis/epiglottitis.  There is some reactive lymphadenopathy.  No obvious surgically drainable fluid collection. Hiro Casarez (ENT) has seen the patient in [facility]  ER. The Flexible laryngoscopy showed some swelling of the left lateral oropharynx extending into the epiglottis, aryepiglottic fold, and arytenoid on the left.  This obscures the visualization of the left true vocal fold.  The right epiglottis, aryepiglottic fold, and arytenoids are within normal limits.  Slight pooling of secretions.  The patient has good abduction and adduction of the vocal folds.  The right true vocal fold has no obvious masses or lesions.  Infraglottic airway appears normal. He advised steroids, antibiotics.     The patient has diet-controlled DM and his glucose is 427 in the ER. He received 12units insulin and fluids. He is kept on insulin sliding scale. He has hyponatremia due to uncontrolled diabetes     He states that his dysphagia is improving. He currently denies fever, chills, nausea, vomiting, chest pain, shortness of breath. He has chronic neck pain. He states that he has had multiple strep infections in the past(one episode  every 5-6years.)     Review of Systems    The 10 point Review of Systems is negative other than noted in the HPI or here.     Past Medical History    I have reviewed this patient's medical history and updated it with pertinent information if needed.   Past Medical History:   Diagnosis Date     Type 2 diabetes mellitus without complication, without long-term current use of insulin (H) 1/12/2018       Past Surgical History   I have reviewed this patient's surgical history and updated it with pertinent information if needed.  Past Surgical History:   Procedure Laterality Date     LAPAROSCOPIC APPENDECTOMY  12/18/2011    Procedure:LAPAROSCOPIC APPENDECTOMY; Surgeon:JEAN CLAUDE LUNA; Location:[facility] OR       Social History   I have reviewed this patient's social history and updated it with pertinent information if needed.  Social History     Tobacco Use     Smoking status: Never     Smokeless tobacco: Never   Vaping Use     Vaping Use: Never used   Substance Use Topics     Alcohol use: Yes     Comment: 12 pack per week     Drug use: Not Currently     Comment: used in the past for 15 yrs       Family History   I have reviewed this patient's family history and updated it with pertinent information if needed.  Family History   Problem Relation Age of Onset     Diabetes Mother      Asthma Mother      Depression Brother      Myocardial Infarction Brother      Hypertension Cousin        Prior to Admission Medications   Prior to Admission Medications   Prescriptions Last Dose Informant Patient Reported? Taking?   acetaminophen (TYLENOL) 500 MG tablet  Self No No   Sig: Take 1-2 tablets (500-1,000 mg) by mouth every 6 hours as needed for mild pain   blood glucose (NO BRAND SPECIFIED) lancets standard  Self No No   Sig: Use to test blood sugar 1 times daily or as directed.   blood glucose monitoring (NO BRAND SPECIFIED) meter device kit  Self No No   Sig: Use to test blood sugar 1 times daily or as directed.   blood glucose  monitoring (NO BRAND SPECIFIED) test strip  Self No No   Sig: Use to test blood sugars 1 times daily or as directed   losartan (COZAAR) 100 MG tablet  Self No No   Sig: Take 1 tablet (100 mg) by mouth daily   magic mouthwash suspension, diphenhydrAMINE, lidocaine, aluminum-magnesium & simethicone, (FIRST-MOUTHWASH BLM) compounding kit   No No   Sig: Swish and swallow 5-10 mLs in mouth every 6 hours as needed for mouth sores   ondansetron (ZOFRAN ODT) 4 MG ODT tab   No No   Sig: Take 1 tablet (4 mg) by mouth every 8 hours as needed for nausea   penicillin V (VEETID) 500 MG tablet 11/21/2022  No Yes   Sig: Take 1 tablet (500 mg) by mouth 2 times daily for 10 days   sertraline (ZOLOFT) 50 MG tablet Past Week Self No Yes   Sig: Take 1 tablet (50 mg) by mouth daily      Facility-Administered Medications: None     Allergies   Allergies   Allergen Reactions     Ace Inhibitors Cough       Physical Exam   Vital Signs: Temp: 97.5  F (36.4  C) Temp src: Oral BP: 124/74 Pulse: 105   Resp: 18 SpO2: 96 % O2 Device: None (Room air)    Weight: 233 lbs 0 oz    Physical Exam  Vitals reviewed.   Constitutional:       General: He is not in acute distress.     Appearance: He is obese. He is not ill-appearing, toxic-appearing or diaphoretic.   HENT:      Head: Normocephalic and atraumatic.      Right Ear: External ear normal.      Left Ear: External ear normal.      Nose: Nose normal. No congestion or rhinorrhea.      Mouth/Throat:      Mouth: Mucous membranes are dry.      Comments: Dentition is in reasonably good.  Mucous membranes are moist.  Tongue is mobile, protrudes to the midline.  Palate elevates symmetrically.  Tonsils are 2+, symmetric.  Mild erythema.  No exudate.  No oral cavity or oropharyngeal masses, lesions, ulcerations, leukoplakia.   Eyes:      General: No scleral icterus.        Right eye: No discharge.         Left eye: No discharge.      Extraocular Movements: Extraocular movements intact.      Conjunctiva/sclera:  Conjunctivae normal.      Pupils: Pupils are equal, round, and reactive to light.   Neck:      Comments: Thick neck  Cardiovascular:      Rate and Rhythm: Normal rate and regular rhythm.      Pulses: Normal pulses.      Heart sounds: No murmur heard.    No friction rub. No gallop.   Pulmonary:      Effort: Pulmonary effort is normal. No respiratory distress.      Breath sounds: Normal breath sounds. No wheezing or rales.      Comments: No stridor at this time  Chest:      Chest wall: No tenderness.   Abdominal:      General: Bowel sounds are normal. There is no distension.      Palpations: Abdomen is soft.      Tenderness: There is no abdominal tenderness. There is no right CVA tenderness, left CVA tenderness, guarding or rebound.   Musculoskeletal:         General: No swelling. Normal range of motion.      Cervical back: Normal range of motion. No rigidity.      Right lower leg: No edema.      Left lower leg: No edema.   Skin:     General: Skin is warm and dry.      Capillary Refill: Capillary refill takes less than 2 seconds.      Coloration: Skin is not jaundiced.      Findings: No rash.   Neurological:      General: No focal deficit present.      Mental Status: He is alert and oriented to person, place, and time.      Cranial Nerves: No cranial nerve deficit.      Sensory: No sensory deficit.      Motor: No weakness.   Psychiatric:         Mood and Affect: Mood normal.         Behavior: Behavior normal.         Thought Content: Thought content normal.         Data   Data reviewed today: I reviewed all medications, new labs and imaging results over the last 24 hours.     Recent Labs   Lab 11/21/22  2305 11/21/22  1820 11/21/22  1645 11/21/22  0526   WBC  --   --   --  5.4   HGB  --   --   --  14.8   MCV  --   --   --  79   PLT  --   --   --  118*   NA  --   --   --  127*   POTASSIUM  --   --   --  3.9   CHLORIDE  --   --   --  86*   CO2  --   --   --  24   BUN  --   --   --  28.0*   CR  --   --   --  1.05    ANIONGAP  --   --   --  17*   JEANNIE  --   --   --  8.8   * 402* 492* 427*   ALBUMIN  --   --   --  3.7   PROTTOTAL  --   --   --  7.8   BILITOTAL  --   --   --  1.1   ALKPHOS  --   --   --  69   ALT  --   --   --  20   AST  --   --   --  16       Visit/Communication Style   Virtual (Video) communication was used to evaluate Ilya.  Ilya consented to the use of video communication: yes    Patient's location: [facility]   Provider's location during the visit: [facility] Tele-medicine site

## 2022-11-22 NOTE — PLAN OF CARE
"Goal Outcome Evaluation:      Plan of Care Reviewed With: patient    Overall Patient Progress: improvingOverall Patient Progress: improving    Outcome Evaluation: Pt's VSS. Afebrile. A&O x4. Denies pain. Pt states that his throat feels \"full.\" Ice water and popscicles are helpful. Receiving IV abx and decadron. Pt on 1L O2 overnight due to desating. Pt states he has been told in the past that he may have sleep apnea. Blood sugars have been in the 300s. Pt states he does not check his sugars at home. Up with standby assist. Gait steady.      "

## 2022-11-22 NOTE — PROGRESS NOTES
S-(situation): Patient arrives to room 218 via cart from [facility]    B-(background): Influenza and Strep    A-(assessment): Pt arrived alert and oriented, able to ambulate with stand by assistance, is somewhat short of breath with activity, has a productive cough with stimulation, lung sounds are clear in the upper lobes and diminished in the bases, he denies pain, states that he is feeling much better compared to this morning, initial vitals are stable, /74   Pulse 105   Temp 97.5  F (36.4  C) (Oral)   Resp 18   Ht 1.829 m (6')   Wt 105.7 kg (233 lb)   SpO2 96%   BMI 31.60 kg/m    He verbalized an understanding of the plan of care, and was able to participate in the admission process.    R-(recommendations): Orders reviewed with Pt. Will monitor patient per MD orders.     Inpatient nursing criteria listed below were met:    Health care directives status obtained and documented: Yes  SCD's Documented: Yes  Skin issues/needs documented:Yes  Isolation addressed and Signage used: Yes  Fall Prevention: Care plan updated Yes Education given and documented Yes  Care Plan initiated and Co-Morbidities added: Yes  Education Assessment documented:Yes  Admission Education Documented: Yes  If present CAUTI/CLABI Education done: NA  New medication patient education completed and documented (Possible Side Effects of Common Medications handout): Yes  Allergies Reviewed: Yes  Admission Medication Reconciliation completed: Yes  Home medications if not able to send immediately home with family stored here: NA  Reminder note placed in discharge instructions regarding home meds: NA  Individualized care needs/preferences addressed and charted: Yes  Provider Notified that patient has arrived to the unit: Yes

## 2022-11-23 LAB
ALBUMIN SERPL-MCNC: 2.3 G/DL (ref 3.4–5)
ALP SERPL-CCNC: 48 U/L (ref 40–150)
ALT SERPL W P-5'-P-CCNC: 25 U/L (ref 0–70)
ANION GAP SERPL CALCULATED.3IONS-SCNC: 7 MMOL/L (ref 3–14)
AST SERPL W P-5'-P-CCNC: 16 U/L (ref 0–45)
BASE EXCESS BLDV CALC-SCNC: 1 MMOL/L (ref -7.7–1.9)
BILIRUB SERPL-MCNC: 0.6 MG/DL (ref 0.2–1.3)
BUN SERPL-MCNC: 26 MG/DL (ref 7–30)
CALCIUM SERPL-MCNC: 7.7 MG/DL (ref 8.5–10.1)
CHLORIDE BLD-SCNC: 104 MMOL/L (ref 94–109)
CO2 SERPL-SCNC: 26 MMOL/L (ref 20–32)
CREAT SERPL-MCNC: 0.67 MG/DL (ref 0.66–1.25)
ERYTHROCYTE [DISTWIDTH] IN BLOOD BY AUTOMATED COUNT: 13 % (ref 10–15)
GFR SERPL CREATININE-BSD FRML MDRD: >90 ML/MIN/1.73M2
GLUCOSE BLD-MCNC: 310 MG/DL (ref 70–99)
GLUCOSE BLDC GLUCOMTR-MCNC: 301 MG/DL (ref 70–99)
GLUCOSE BLDC GLUCOMTR-MCNC: 321 MG/DL (ref 70–99)
GLUCOSE BLDC GLUCOMTR-MCNC: 338 MG/DL (ref 70–99)
GLUCOSE BLDC GLUCOMTR-MCNC: 345 MG/DL (ref 70–99)
GLUCOSE BLDC GLUCOMTR-MCNC: 356 MG/DL (ref 70–99)
HCO3 BLDV-SCNC: 26 MMOL/L (ref 21–28)
HCT VFR BLD AUTO: 36.2 % (ref 40–53)
HGB BLD-MCNC: 12.7 G/DL (ref 13.3–17.7)
KETONES BLD-SCNC: 0.8 MMOL/L (ref 0–0.6)
KETONES BLD-SCNC: 1 MMOL/L (ref 0–0.6)
MCH RBC QN AUTO: 29.1 PG (ref 26.5–33)
MCHC RBC AUTO-ENTMCNC: 35.1 G/DL (ref 31.5–36.5)
MCV RBC AUTO: 83 FL (ref 78–100)
O2/TOTAL GAS SETTING VFR VENT: 21 %
PCO2 BLDV: 42 MM HG (ref 40–50)
PH BLDV: 7.4 [PH] (ref 7.32–7.43)
PLATELET # BLD AUTO: 153 10E3/UL (ref 150–450)
PO2 BLDV: 33 MM HG (ref 25–47)
POTASSIUM BLD-SCNC: 4.3 MMOL/L (ref 3.4–5.3)
PROT SERPL-MCNC: 6.4 G/DL (ref 6.8–8.8)
RBC # BLD AUTO: 4.37 10E6/UL (ref 4.4–5.9)
SODIUM SERPL-SCNC: 137 MMOL/L (ref 133–144)
WBC # BLD AUTO: 9.9 10E3/UL (ref 4–11)

## 2022-11-23 PROCEDURE — 80053 COMPREHEN METABOLIC PANEL: CPT | Performed by: FAMILY MEDICINE

## 2022-11-23 PROCEDURE — 99233 SBSQ HOSP IP/OBS HIGH 50: CPT | Performed by: FAMILY MEDICINE

## 2022-11-23 PROCEDURE — 82803 BLOOD GASES ANY COMBINATION: CPT | Performed by: FAMILY MEDICINE

## 2022-11-23 PROCEDURE — 82010 KETONE BODYS QUAN: CPT | Performed by: FAMILY MEDICINE

## 2022-11-23 PROCEDURE — 36415 COLL VENOUS BLD VENIPUNCTURE: CPT | Performed by: FAMILY MEDICINE

## 2022-11-23 PROCEDURE — 85027 COMPLETE CBC AUTOMATED: CPT | Performed by: FAMILY MEDICINE

## 2022-11-23 PROCEDURE — 258N000003 HC RX IP 258 OP 636: Performed by: INTERNAL MEDICINE

## 2022-11-23 PROCEDURE — 120N000001 HC R&B MED SURG/OB

## 2022-11-23 PROCEDURE — 250N000013 HC RX MED GY IP 250 OP 250 PS 637: Performed by: INTERNAL MEDICINE

## 2022-11-23 PROCEDURE — 250N000013 HC RX MED GY IP 250 OP 250 PS 637: Performed by: FAMILY MEDICINE

## 2022-11-23 PROCEDURE — 250N000012 HC RX MED GY IP 250 OP 636 PS 637: Performed by: FAMILY MEDICINE

## 2022-11-23 PROCEDURE — 250N000011 HC RX IP 250 OP 636: Performed by: INTERNAL MEDICINE

## 2022-11-23 PROCEDURE — 250N000011 HC RX IP 250 OP 636: Performed by: FAMILY MEDICINE

## 2022-11-23 RX ORDER — METHYLPREDNISOLONE 4 MG/1
4 TABLET ORAL ONCE
Status: DISCONTINUED | OUTPATIENT
Start: 2022-11-23 | End: 2022-11-23

## 2022-11-23 RX ORDER — METHYLPREDNISOLONE 4 MG/1
4 TABLET ORAL
Status: DISCONTINUED | OUTPATIENT
Start: 2022-11-24 | End: 2022-11-23

## 2022-11-23 RX ORDER — OSELTAMIVIR PHOSPHATE 75 MG/1
75 CAPSULE ORAL 2 TIMES DAILY
Status: DISCONTINUED | OUTPATIENT
Start: 2022-11-23 | End: 2022-11-24 | Stop reason: HOSPADM

## 2022-11-23 RX ORDER — METHYLPREDNISOLONE 4 MG/1
8 TABLET ORAL ONCE
Status: COMPLETED | OUTPATIENT
Start: 2022-11-23 | End: 2022-11-23

## 2022-11-23 RX ORDER — METHYLPREDNISOLONE 4 MG/1
4 TABLET ORAL 2 TIMES DAILY
Status: DISCONTINUED | OUTPATIENT
Start: 2022-11-24 | End: 2022-11-24 | Stop reason: HOSPADM

## 2022-11-23 RX ORDER — METHYLPREDNISOLONE 4 MG/1
8 TABLET ORAL 2 TIMES DAILY
Status: COMPLETED | OUTPATIENT
Start: 2022-11-23 | End: 2022-11-24

## 2022-11-23 RX ORDER — METHYLPREDNISOLONE 4 MG/1
8 TABLET ORAL AT BEDTIME
Status: DISCONTINUED | OUTPATIENT
Start: 2022-11-23 | End: 2022-11-23

## 2022-11-23 RX ORDER — METHYLPREDNISOLONE 4 MG/1
4 TABLET ORAL AT BEDTIME
Status: DISCONTINUED | OUTPATIENT
Start: 2022-11-25 | End: 2022-11-23

## 2022-11-23 RX ORDER — LOSARTAN POTASSIUM 50 MG/1
100 TABLET ORAL DAILY
Status: DISCONTINUED | OUTPATIENT
Start: 2022-11-23 | End: 2022-11-24 | Stop reason: HOSPADM

## 2022-11-23 RX ADMIN — AMOXICILLIN AND CLAVULANATE POTASSIUM 1 TABLET: 875; 125 TABLET, FILM COATED ORAL at 20:45

## 2022-11-23 RX ADMIN — METFORMIN HYDROCHLORIDE 1000 MG: 500 TABLET ORAL at 16:56

## 2022-11-23 RX ADMIN — AMPICILLIN SODIUM AND SULBACTAM SODIUM 3 G: 2; 1 INJECTION, POWDER, FOR SOLUTION INTRAMUSCULAR; INTRAVENOUS at 01:10

## 2022-11-23 RX ADMIN — OSELTAMIVIR PHOSPHATE 75 MG: 75 CAPSULE ORAL at 20:45

## 2022-11-23 RX ADMIN — LOSARTAN POTASSIUM 100 MG: 50 TABLET, FILM COATED ORAL at 10:24

## 2022-11-23 RX ADMIN — AMOXICILLIN AND CLAVULANATE POTASSIUM 1 TABLET: 875; 125 TABLET, FILM COATED ORAL at 12:29

## 2022-11-23 RX ADMIN — ENOXAPARIN SODIUM 40 MG: 40 INJECTION SUBCUTANEOUS at 21:31

## 2022-11-23 RX ADMIN — SODIUM CHLORIDE: 9 INJECTION, SOLUTION INTRAVENOUS at 16:57

## 2022-11-23 RX ADMIN — AMPICILLIN SODIUM AND SULBACTAM SODIUM 3 G: 2; 1 INJECTION, POWDER, FOR SOLUTION INTRAMUSCULAR; INTRAVENOUS at 06:54

## 2022-11-23 RX ADMIN — GUAIFENESIN AND CODEINE PHOSPHATE 5 ML: 10; 100 LIQUID ORAL at 20:45

## 2022-11-23 RX ADMIN — METHYLPREDNISOLONE 8 MG: 4 TABLET ORAL at 20:45

## 2022-11-23 RX ADMIN — METHYLPREDNISOLONE 8 MG: 4 TABLET ORAL at 08:18

## 2022-11-23 RX ADMIN — OSELTAMIVIR PHOSPHATE 75 MG: 6 POWDER, FOR SUSPENSION ORAL at 08:34

## 2022-11-23 RX ADMIN — SERTRALINE HYDROCHLORIDE 50 MG: 50 TABLET ORAL at 08:18

## 2022-11-23 RX ADMIN — DEXAMETHASONE SODIUM PHOSPHATE 6 MG: 10 INJECTION, SOLUTION INTRAMUSCULAR; INTRAVENOUS at 04:07

## 2022-11-23 ASSESSMENT — ACTIVITIES OF DAILY LIVING (ADL)
ADLS_ACUITY_SCORE: 20

## 2022-11-23 NOTE — PLAN OF CARE
Goal Outcome Evaluation:      Plan of Care Reviewed With: patient    Overall Patient Progress: improvingOverall Patient Progress: improving    Outcome Evaluation: A&O. No complaints of throat discomfort today. Tolerating diet. ,338, 345. Ketones 0.8. Good po intake. Voiding. Ambulating in halls frequently. Able to demonostrate use of sliding scale and self-administer evening regular insulin with instructions.  Plan to continue insulin education and    Jefferson Minaya RN

## 2022-11-23 NOTE — PROGRESS NOTES
[facility]    Medicine Progress Note - Hospitalist Service    Date of Admission:  11/21/2022    Assessment & Plan     Patient is a 52-year-old gentleman who presents with influenza A, strep throat, and epiglottitis and was also found to have persistent hyperglycemia with diabetic ketosis likely secondary to inadequate oral intake caused by acute illness was hospitalized for close monitoring of his respiratory status given epiglottitis severity and improvement of his blood sugar control.  Patient has had some clinical improvement overnight and is able to swallow more easily without signs of respiratory distress and has been transitioned to oral Medrol and Augmentin.  Blood sugars continue to be significantly elevated with ketones also elevated.  Patient has agreed to initiate metformin and continue sliding scale NovoLog at time of discharge for steroid effect but is not willing to be on long-acting insulin.  Anticipate discharge home tomorrow as long as breathing continues to be stable, ketones and blood sugars stable to improving and patient doing well with self administration of insulin    Principal Problem:    Epiglottitis    Assessment: Noted on imaging and confirmed with flex laryngoscopy by ENT on 11/21.  On Decadron and Unasyn with clinical and visual improvement noted and patient transition to Medrol and Augmentin without difficulty    Plan:   -Continue regular diet as tolerated  -Patient will discharge on Medrol and Augmentin as recommended by ENT  -ENT follow-up in 1 to 2 weeks after discharge    Active Problems:    Acute streptococcal pharyngitis    Assessment: On Augmentin    Plan:   -Continue now and at time of discharge      Influenza A    Assessment: Present at time of admission with family members also ill with same illness.  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  He did have severe hyperglycemia and diabetic ketosis but no acidosis at time of hospitalization and following steroid administration has continue to to have persistent hyperglycemia.  Patient was initially very resistant to starting any oral medication or insulin as he feels that he will be able to get his blood sugars under control with diabetic diet alone but due to ongoing blood sugar elevation has agreed to start metformin for \"a short while\" until he can follow-up with his primary care provider to discuss more.  He is also now open to short acting insulin 4 times a day to help with steroid effect which is anticipated to decrease in upcoming days as Medrol is reduced daily    Plan:   -Continue low carbohydrate diet  -Continue metformin 1000 mg daily   -Continue NovoLog high resistance protocol and nursing will teach patient how to use the scale as well as administer insulin  -Recheck ketones tomorrow      Hyponatremia    Assessment: Caused by hyperglycemia and normal when corrected for this    Plan: Continue to monitor for ongoing resolution      Essential hypertension    Assessment: On losartan at baseline which has been on hold since time of admission with blood pressures in the normal range    Plan: We will restart home losartan and monitor for blood pressure improvement         Diet: Low Consistent Carb (45 g CHO per Meal) Diet    DVT Prophylaxis: Pneumatic Compression Devices  Bar Catheter: Not present  Central Lines: None  Cardiac Monitoring: None  Code Status: Full Code      Disposition Plan      Expected Discharge Date: 11/24/2022        Discharge Comments: ENT needs to scope patient this afternoon.        The patient's care was discussed with the Bedside Nurse and Patient.    Cathy Oleary MD  Hospitalist Service  M " [facility]  Securely message with the [employer] Web Console (learn more here)  Text page via [employer] Paging/Directory         Clinically Significant Risk Factors         # Hyponatremia: Lowest Na = 134 mmol/L (Ref range: 136-145) in last 2 days, will monitor as appropriate      # Hypoalbuminemia: Lowest albumin = 2.3 g/dL (Ref range: 3.5-5.2) at 11/23/2022  5:39 AM, will monitor as appropriate   # Thrombocytopenia: Lowest platelets = 118 (Ref range: 150-450) in last 2 days, will monitor for bleeding       # DMII: A1C = 8.9 % (Ref range: 0.0 - 5.6 %) within past 3 months, PRESENT ON ADMISSION  # Obesity: Estimated body mass index is 31.6 kg/m  as calculated from the following:    Height as of this encounter: 1.829 m (6').    Weight as of this encounter: 105.7 kg (233 lb)., PRESENT ON ADMISSION         ______________________________________________________________________    Interval History   Patient has been vitally stable apart from mildly elevated blood pressure developing in the last 24 hours as losartan has been held.  He denies any difficulty with swallowing and no signs of breathing distress.  Cough is ongoing but continues to improve.  Patient denies any new symptoms.  No new nursing concerns.    Data reviewed today: I reviewed all medications, new labs and imaging results over the last 24 hours.    Physical Exam   Vital Signs: Temp: 97.4  F (36.3  C) Temp src: Oral BP: (!) 151/93 Pulse: 78   Resp: 20 SpO2: 95 % O2 Device: None (Room air)    Weight: 233 lbs 0 oz  Constitutional: awake, alert, cooperative, no apparent distress, and appears stated age  Respiratory: No increased work of breathing, good air exchange, clear to auscultation bilaterally, no crackles or wheezing  Cardiovascular: Regular rate and rhythm without murmur  GI: Bowel sounds present, abdomen obese but soft and nontender  Skin: Patient has had improvement of his eczematous rash on his chest with ongoing  steroids  Musculoskeletal: no lower extremity pitting edema present  Neurologic: Awake, alert, oriented to name, place and situation    Data   Recent Labs   Lab 11/23/22  2100 11/23/22  1656 11/23/22  1146 11/23/22  0743 11/23/22  0539 11/22/22  0748 11/22/22  0536 11/21/22  1645 11/21/22  0526   WBC  --   --   --   --  9.9  --  6.6  --  5.4   HGB  --   --   --   --  12.7*  --  12.3*  --  14.8   MCV  --   --   --   --  83  --  82  --  79   PLT  --   --   --   --  153  --  118*  --  118*   NA  --   --   --   --  137  --  134  --  127*   POTASSIUM  --   --   --   --  4.3  --  3.8  --  3.9   CHLORIDE  --   --   --   --  104  --  102  --  86*   CO2  --   --   --   --  26  --  24  --  24   BUN  --   --   --   --  26  --  23  --  28.0*   CR  --   --   --   --  0.67  --  0.59*  --  1.05   ANIONGAP  --   --   --   --  7  --  8  --  17*   JEANNIE  --   --   --   --  7.7*  --  7.8*  --  8.8   * 345* 338*   < > 310*   < > 277*   < > 427*   ALBUMIN  --   --   --   --  2.3*  --   --   --  3.7   PROTTOTAL  --   --   --   --  6.4*  --   --   --  7.8   BILITOTAL  --   --   --   --  0.6  --   --   --  1.1   ALKPHOS  --   --   --   --  48  --   --   --  69   ALT  --   --   --   --  25  --   --   --  20   AST  --   --   --   --  16  --   --   --  16    < > = values in this interval not displayed.

## 2022-11-23 NOTE — PLAN OF CARE
Goal Outcome Evaluation:      Plan of Care Reviewed With: patient    Overall Patient Progress: improvingOverall Patient Progress: improving    Outcome Evaluation: A&Ox4. No complaints of pain. Up independently. Blood sugars 348 and 301.  Ketones from 1.5 to 0.7, with recheck this due this AM. Intermittent cough. Droplet precuations maintained for Influenza A.

## 2022-11-24 ENCOUNTER — TELEPHONE (OUTPATIENT)
Dept: FAMILY MEDICINE | Facility: CLINIC | Age: 52
End: 2022-11-24

## 2022-11-24 VITALS
WEIGHT: 233 LBS | RESPIRATION RATE: 20 BRPM | HEIGHT: 72 IN | DIASTOLIC BLOOD PRESSURE: 86 MMHG | BODY MASS INDEX: 31.56 KG/M2 | TEMPERATURE: 97 F | SYSTOLIC BLOOD PRESSURE: 140 MMHG | HEART RATE: 75 BPM | OXYGEN SATURATION: 96 %

## 2022-11-24 LAB
ANION GAP SERPL CALCULATED.3IONS-SCNC: 6 MMOL/L (ref 3–14)
BUN SERPL-MCNC: 26 MG/DL (ref 7–30)
CALCIUM SERPL-MCNC: 7.8 MG/DL (ref 8.5–10.1)
CHLORIDE BLD-SCNC: 105 MMOL/L (ref 94–109)
CO2 SERPL-SCNC: 24 MMOL/L (ref 20–32)
CREAT SERPL-MCNC: 0.71 MG/DL (ref 0.66–1.25)
GFR SERPL CREATININE-BSD FRML MDRD: >90 ML/MIN/1.73M2
GLUCOSE BLD-MCNC: 301 MG/DL (ref 70–99)
GLUCOSE BLDC GLUCOMTR-MCNC: 275 MG/DL (ref 70–99)
GLUCOSE BLDC GLUCOMTR-MCNC: 317 MG/DL (ref 70–99)
KETONES BLD-SCNC: 0.3 MMOL/L (ref 0–0.6)
PLATELET # BLD AUTO: 173 10E3/UL (ref 150–450)
POTASSIUM BLD-SCNC: 4.4 MMOL/L (ref 3.4–5.3)
SODIUM SERPL-SCNC: 135 MMOL/L (ref 133–144)

## 2022-11-24 PROCEDURE — 250N000012 HC RX MED GY IP 250 OP 636 PS 637: Performed by: FAMILY MEDICINE

## 2022-11-24 PROCEDURE — 250N000013 HC RX MED GY IP 250 OP 250 PS 637: Performed by: FAMILY MEDICINE

## 2022-11-24 PROCEDURE — 82010 KETONE BODYS QUAN: CPT | Performed by: FAMILY MEDICINE

## 2022-11-24 PROCEDURE — 85049 AUTOMATED PLATELET COUNT: CPT | Performed by: INTERNAL MEDICINE

## 2022-11-24 PROCEDURE — 250N000013 HC RX MED GY IP 250 OP 250 PS 637: Performed by: INTERNAL MEDICINE

## 2022-11-24 PROCEDURE — 36415 COLL VENOUS BLD VENIPUNCTURE: CPT | Performed by: FAMILY MEDICINE

## 2022-11-24 PROCEDURE — 258N000003 HC RX IP 258 OP 636: Performed by: INTERNAL MEDICINE

## 2022-11-24 PROCEDURE — 80048 BASIC METABOLIC PNL TOTAL CA: CPT | Performed by: FAMILY MEDICINE

## 2022-11-24 PROCEDURE — 99239 HOSP IP/OBS DSCHRG MGMT >30: CPT | Performed by: FAMILY MEDICINE

## 2022-11-24 RX ORDER — METHYLPREDNISOLONE 4 MG/1
4 TABLET ORAL 2 TIMES DAILY
Qty: 2 TABLET | Refills: 0 | Status: SHIPPED | OUTPATIENT
Start: 2022-11-24 | End: 2022-11-25

## 2022-11-24 RX ORDER — ALBUTEROL SULFATE 90 UG/1
2 AEROSOL, METERED RESPIRATORY (INHALATION) EVERY 4 HOURS PRN
Status: DISCONTINUED | OUTPATIENT
Start: 2022-11-24 | End: 2022-11-24 | Stop reason: HOSPADM

## 2022-11-24 RX ORDER — METHYLPREDNISOLONE 4 MG/1
4 TABLET ORAL 2 TIMES DAILY
Qty: 2 TABLET | Refills: 0 | Status: SHIPPED | OUTPATIENT
Start: 2022-11-25 | End: 2022-11-26

## 2022-11-24 RX ORDER — METHYLPREDNISOLONE 4 MG/1
4 TABLET ORAL 2 TIMES DAILY
Status: DISCONTINUED | OUTPATIENT
Start: 2022-11-24 | End: 2022-11-24 | Stop reason: HOSPADM

## 2022-11-24 RX ORDER — OSELTAMIVIR PHOSPHATE 75 MG/1
75 CAPSULE ORAL 2 TIMES DAILY
Qty: 2 CAPSULE | Refills: 0 | Status: SHIPPED | OUTPATIENT
Start: 2022-11-25 | End: 2022-11-26

## 2022-11-24 RX ORDER — OSELTAMIVIR PHOSPHATE 75 MG/1
75 CAPSULE ORAL 2 TIMES DAILY
Qty: 2 CAPSULE | Refills: 0 | Status: SHIPPED | OUTPATIENT
Start: 2022-11-24 | End: 2022-11-25

## 2022-11-24 RX ORDER — ALBUTEROL SULFATE 0.83 MG/ML
2.5 SOLUTION RESPIRATORY (INHALATION) EVERY 4 HOURS PRN
Status: DISCONTINUED | OUTPATIENT
Start: 2022-11-24 | End: 2022-11-24 | Stop reason: HOSPADM

## 2022-11-24 RX ORDER — OSELTAMIVIR PHOSPHATE 75 MG/1
75 CAPSULE ORAL 2 TIMES DAILY
Status: DISCONTINUED | OUTPATIENT
Start: 2022-11-24 | End: 2022-11-24 | Stop reason: HOSPADM

## 2022-11-24 RX ORDER — ALBUTEROL SULFATE 90 UG/1
2 AEROSOL, METERED RESPIRATORY (INHALATION) EVERY 4 HOURS PRN
Qty: 18 G | DISCHARGE
Start: 2022-11-24 | End: 2024-02-06

## 2022-11-24 RX ADMIN — OSELTAMIVIR PHOSPHATE 75 MG: 75 CAPSULE ORAL at 08:32

## 2022-11-24 RX ADMIN — LOSARTAN POTASSIUM 100 MG: 50 TABLET, FILM COATED ORAL at 08:32

## 2022-11-24 RX ADMIN — SODIUM CHLORIDE: 9 INJECTION, SOLUTION INTRAVENOUS at 03:51

## 2022-11-24 RX ADMIN — AMOXICILLIN AND CLAVULANATE POTASSIUM 1 TABLET: 875; 125 TABLET, FILM COATED ORAL at 08:32

## 2022-11-24 RX ADMIN — SERTRALINE HYDROCHLORIDE 50 MG: 50 TABLET ORAL at 08:32

## 2022-11-24 RX ADMIN — Medication 1 LOZENGE: at 09:56

## 2022-11-24 RX ADMIN — METHYLPREDNISOLONE 8 MG: 4 TABLET ORAL at 08:32

## 2022-11-24 ASSESSMENT — ACTIVITIES OF DAILY LIVING (ADL)
ADLS_ACUITY_SCORE: 20

## 2022-11-24 NOTE — PROGRESS NOTES
S: Patient discharged to home with spouse    B: Epiglottitis, Influenza A    A: Denies pain, VSS, afebrile. Alert ans oriented. Lungs expiratory wheezes. Skin warm and dry.     R: Discharge instructions reviewed with patient. Listed belongings gathered and returned to patient.          Discharge Nursing Criteria:     Care Plan and Patient education resolved: Yes    New Medications- pt has been educated about purpose and side effects: Yes    MISC  Prescriptions if needed, hard copies sent with patient  NA  Home medications returned to patient: Yes  Medication Bin checked and emptied on discharge Yes  Patient reports post-discharge pain management plan is effective: Yes

## 2022-11-24 NOTE — PLAN OF CARE
Goal Outcome Evaluation:      Plan of Care Reviewed With: patient    Overall Patient Progress: improvingOverall Patient Progress: improving    Outcome Evaluation: A&Ox4. Denies pain. Independent in room. Robitussin given x1 for frequent cough. Demonstrated use of sliding scale and self administered bedtime insulin. BG was 321. On room air and VSS. Possible home today.

## 2022-11-24 NOTE — DISCHARGE SUMMARY
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  Bring your blood sugar   readings in for review.  Follow up with ENT specialist on 12/9/22 as scheduled.             Discharge Disposition   Discharged to home  Condition at discharge: Stable    Hospital Course    Patient is a 52-year-old gentleman who presents with influenza A, strep throat, and epiglottitis and was also found to have persistent hyperglycemia with diabetic ketosis likely secondary to inadequate oral intake caused by acute illness was hospitalized for close monitoring of his respiratory status given epiglottitis severity and improvement of his blood sugar control.  Patient has had some clinical improvement with repeat flex laryngoscopy showing improvement of swelling and patient is able to swallow easily without signs of respiratory distress and has been transitioned to oral Medrol and Augmentin with ongoing improvement.  Blood sugars continued to be significantly elevated with ketones also elevated.  Patient agreed to initiate metformin and continue sliding scale NovoLog at time of discharge for steroid effect but is not willing to be on long-acting insulin.  Blood sugars continue to be elevated in the 200-300 range but ketosis is resolved and blood sugars anticipated  "to continue to trend down as metformin works and steroid effect decreases with tapering dosing.  Patient discharge home in stable condition with close follow up with primary care clinic and ENT specialist follow-up    Principal Problem:    Epiglottitis    Assessment: Noted on imaging and confirmed with flex laryngoscopy by ENT on 11/21.  On Decadron and Unasyn with clinical and visual improvement noted and patient transition to Medrol and Augmentin without difficulty    Plan:   -Continue regular diet as tolerated  -Patient will discharge on Medrol taper and Augmentin as recommended by ENT  -ENT follow-up 2 weeks after discharge - scheduled    Active Problems:    Acute streptococcal pharyngitis    Assessment: On Augmentin    Plan:   -Continue at time of discharge      Influenza A    Assessment: Present at time of admission with family members also ill with same illness.  No signs of respiratory failure    Plan:   -Continue Tamiflu for 5 days total course completion   -continue albuterol inhaler as needed for wheezing.  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  He did have severe hyperglycemia and diabetic ketosis but no acidosis at time of hospitalization and following steroid administration has continue to to have persistent hyperglycemia.  Patient was initially resistant to starting any oral medication or insulin as he feels that he will be able to get his blood sugars under control with diabetic diet alone as he as in the past but due to ongoing blood sugar elevation, he did agree to start metformin for \"a short while\" until he can follow-up with his primary care provider to discuss more.  He is also willing to continue with short acting insulin 4 " times a day to help with steroid effect which is anticipated to decrease in upcoming days as Medrol is reduced daily    Plan:   -Continue low carbohydrate diet  -Continue metformin 1000 mg daily   -Continue NovoLog high resistance protocol at meals and bedtime upon discharge   -Patient to bring record of blood pressures to follow up appointment for review.       Hyponatremia    Assessment: Caused by hyperglycemia and normal when corrected for this, now normal    Plan: no further monitoring needed      Essential hypertension    Assessment: On losartan at baseline     Plan: continue without change at discharge     Consultations This Hospital Stay   ENT IP CONSULT  ENT IP CONSULT    Code Status   Full Code    Time Spent on this Encounter   I, Cathy Oleary MD, personally saw the patient today and spent greater than 30 minutes discharging this patient.       Cathy Oleary MD  [address] SURGICAL  [address] [place] [address]  Phone: [phone]  ______________________________________________________________________    Physical Exam   Vital Signs: Temp: 97  F (36.1  C) Temp src: Oral BP: (!) 140/86 Pulse: 75   Resp: 20 SpO2: 96 % O2 Device: None (Room air)    Weight: 233 lbs 0 oz  Constitutional: awake, alert, cooperative, no apparent distress, and appears stated age  Respiratory: No increased work of breathing, good air exchange but ongoing diffuse expiratory wheezing noted, no crackles  Cardiovascular: RRR without murmur  GI: bowel sounds present, abdomen soft and non-tender  Skin: no redness, warmth, or swelling and no rashes  Musculoskeletal: no lower extremity pitting edema present  Neurologic: Awake, alert, oriented to name, place and situation        Primary Care Physician   Carin Pepe    Discharge Orders      Adult ENT  Referral      Reason for your hospital stay    1.  Epiglottitis - swelling of the area above your vocal cords  in your throat that can cause dangerous airway complications.  You have responded very well to the steroids and antibiotics given during this stay and will continue on these as you go home.  Please follow up with the ENT specialist on 12/9/22 as scheduled  2.  Strep throat - improving with the antibiotic that you will continue as you go home  3.  Influenza A - improving overall.  Please continue the Tamiflu antiviral treatment and use the inhaler as you need for wheezing  4.  Diabetes with high blood sugars when you came in and hemoglobin A1C of 8.9 and signs of diabetic ketosis (high blood sugars causing ketones to build up in your bloodstream which can cause a life threatening condition called ketoacidosis if not improved) and your blood sugars worsened even further after the steroids were started.  You have been started on metformin daily and will leave with the sliding scale Novolog with meals and at bedtime.  Please record your blood sugars and bring them in to your primary care follow up appointment for review.  The hope is that as the steroid effect decreases and you stay consistent with a diabetic low carb diet that you will be able to get off one or both of these medications going forward.     Activity    Your activity upon discharge: activity as tolerated     Follow-up and recommended labs and tests     Follow up with primary care provider, Carin Pepe, or one of her partners within 7 days for hospital follow- up.  Bring your blood sugar readings in for review.  Follow up with ENT specialist on 12/9/22 as scheduled.     Diet    Follow this diet upon discharge:     Low Consistent Carb (45 g CHO per Meal) Diabetic Diet       Significant Results and Procedures   Results for orders placed or performed during the hospital encounter of 11/21/22   Soft tissue neck CT w contrast    Narrative    CT SCAN OF THE NECK WITH CONTRAST  11/21/2022 11:40 AM     HISTORY: Severe pharyngitis, inability to swallow,  strep, abnormal  nasolaryngoscopy - difficulty visualizing cords with soft tissue  swelling    TECHNIQUE: Axial CT images of the neck following administration of  intravenous contrast with reformations. Radiation dose for this scan  was reduced using automated exposure control, adjustment of the mA  and/or kV according to patient size, or iterative reconstruction  technique. 90mL Isovue-370 IV.     COMPARISON: None.     FINDINGS: Exam is mildly limited by motion-related artifact. There is  moderate hyperattenuating soft tissue swelling involving the mid to  lower aspect of the left oropharyngeal wall, the left and paramedian  aspects of the epiglottis, the left aryepiglottic fold, the left  piriform sinus, and the left false vocal fold with associated fat  stranding in the left paraglottic fat as well as mild fat stranding in  the left parapharyngeal space, and effacement of the normal fat plane  along the lateral margin of the left thyrohyoid membrane with some  mild fat stranding at that location. No definite drainable fluid  collection/abscess is identified. There is associated narrowing of the  supraglottic laryngeal airway. The true vocal folds are difficult to  evaluate due to soft tissue swelling involving the supraglottic  larynx. The subglottic larynx and visualized trachea appear normal.    The visualized aspects of the orbits appear normal. The visualized  intracranial contents appear unremarkable. Mild to moderate mucosal  thickening in the bilateral maxillary sinuses and to a lesser extent  in the other paranasal sinuses with some layering fluid in the  bilateral maxillary sinuses (right greater than left) and also  possibly in the sphenoid sinuses bilaterally. The  spaces  appear unremarkable. No gross abnormality of the parotid glands or  submandibular glands. The thyroid gland appears unremarkable on CT.  Mildly prominent upper cervical lymph nodes, likely reactive. No  definite pathologic  cervical lymphadenopathy by size or morphology  criteria. No cystic/necrotic lymph nodes are seen.    Mild left carotid bifurcation atherosclerosis. The cervical carotid  and vertebral arteries appear grossly patent. Multiple partially  visualized irregular nonspecific soft tissue nodules in the upper lung  zones bilaterally (series 2 images 120-127) measuring up to at least  23-24 mm in axial plane. Multilevel degenerative changes noted in the  cervical spine.      Impression    IMPRESSION:  1. Soft tissue swelling/edema primarily involving the left aspect of  the oropharynx/hypopharynx and supraglottic larynx, as described.  Findings are concerning for pharyngitis/supraglottitis given the  patient's history. No definite drainable fluid collection/abscess  identified.  2. Multiple indeterminate irregular noncalcified soft tissue nodules  in the upper pulmonary lobes bilaterally. These may be  infectious/inflammatory, but neoplastic disease is not entirely  excluded. Recommend short interval follow-up chest CT.    LASHAWN TREVINO MD         SYSTEM ID:  [id number]       Discharge Medications   Current Discharge Medication List      START taking these medications    Details   albuterol (PROAIR HFA/PROVENTIL HFA/VENTOLIN HFA) 108 (90 Base) MCG/ACT inhaler Inhale 2 puffs into the lungs every 4 hours as needed for wheezing  Qty: 18 g    Comments: Pharmacy may dispense brand covered by insurance (Proair, or proventil or ventolin or generic albuterol inhaler)  Associated Diagnoses: Influenza A; Wheezing      !! amoxicillin-clavulanate (AUGMENTIN) 875-125 MG tablet Take 1 tablet by mouth every 12 hours for 2 doses  Qty: 2 tablet, Refills: 0    Associated Diagnoses: Epiglottitis      !! amoxicillin-clavulanate (AUGMENTIN) 875-125 MG tablet Take 1 tablet by mouth 2 times daily for 6 days  Qty: 12 tablet, Refills: 0    Associated Diagnoses: Epiglottitis      !! insulin aspart (NOVOLOG PEN) 100 UNIT/ML pen Inject 1-7 Units  Subcutaneous At Bedtime HIGH INSULIN RESISTANCE DOSING    Do Not give Bedtime Correction Insulin if BG less than 200.   For  - 224 give 1 units.   For  - 249 give 2 units.   For  - 274 give 3 units.   For  - 299 give 4 units.   For  - 324 give 5 units.   For  - 349 give 6 units.   For BG greater than or equal to 350 give 7 units.  Qty: 15 mL    Associated Diagnoses: Uncontrolled type 2 diabetes mellitus with hyperglycemia (H)      !! insulin aspart (NOVOLOG PEN) 100 UNIT/ML pen Inject 1-10 Units Subcutaneous 3 times daily (before meals) To be given with prandial insulin, and based on pre-meal blood glucose.   Do Not give Correction Insulin if Pre-Meal BG less than 140.   For Pre-Meal  - 164 give 1 unit.   For Pre-Meal  - 189 give 2 units.   For Pre-Meal  - 214 give 3 units.   For Pre-Meal  - 239 give 4 units.   For Pre-Meal  - 264 give 5 units.   For Pre-Meal  - 289 give 6 units.   For Pre-Meal  - 314 give 7 units.   For Pre-Meal  - 339 give 8 units.   For Pre-Meal  - 364 give 9 units.   For Pre-Meal BG greater than or equal to 365 give 10 units  Qty: 15 mL    Associated Diagnoses: Uncontrolled type 2 diabetes mellitus with hyperglycemia (H)      !! metFORMIN (GLUCOPHAGE) 1000 MG tablet Take 1 tablet (1,000 mg) by mouth daily (with dinner) for 1 dose  Qty: 1 tablet, Refills: 0    Associated Diagnoses: Uncontrolled type 2 diabetes mellitus with hyperglycemia (H)      !! metFORMIN (GLUCOPHAGE) 1000 MG tablet Take 1 tablet (1,000 mg) by mouth daily (with dinner)  Qty: 30 tablet, Refills: 0    Associated Diagnoses: Uncontrolled type 2 diabetes mellitus with hyperglycemia (H)      !! methylPREDNISolone (MEDROL) 4 MG tablet Take 1 tablet (4 mg) by mouth 2 times daily for 2 doses  Qty: 2 tablet, Refills: 0    Associated Diagnoses: Epiglottitis      !! methylPREDNISolone (MEDROL) 4 MG tablet Take 1 tablet (4 mg) by mouth 2 times daily  for 1 day  Qty: 2 tablet, Refills: 0    Associated Diagnoses: Epiglottitis      !! oseltamivir (TAMIFLU) 75 MG capsule Take 1 capsule (75 mg) by mouth 2 times daily for 2 doses  Qty: 2 capsule, Refills: 0    Associated Diagnoses: Influenza A      !! oseltamivir (TAMIFLU) 75 MG capsule Take 1 capsule (75 mg) by mouth 2 times daily for 1 day  Qty: 2 capsule, Refills: 0    Associated Diagnoses: Influenza A       !! - Potential duplicate medications found. Please discuss with provider.      CONTINUE these medications which have NOT CHANGED    Details   acetaminophen (TYLENOL) 500 MG tablet Take 1-2 tablets (500-1,000 mg) by mouth every 6 hours as needed for mild pain    Associated Diagnoses: Chronic right-sided low back pain with right-sided sciatica      blood glucose monitoring (NO BRAND SPECIFIED) test strip Use to test blood sugars 1 times daily or as directed  Qty: 100 strip, Refills: 3    Associated Diagnoses: Type 2 diabetes mellitus without complication, without long-term current use of insulin (H)      losartan (COZAAR) 100 MG tablet Take 1 tablet (100 mg) by mouth daily  Qty: 90 tablet, Refills: 3    Associated Diagnoses: Essential hypertension      sertraline (ZOLOFT) 50 MG tablet Take 1 tablet (50 mg) by mouth daily  Qty: 90 tablet, Refills: 3    Associated Diagnoses: Anxiety and depression      blood glucose (NO BRAND SPECIFIED) lancets standard Use to test blood sugar 1 times daily or as directed.  Qty: 100 each, Refills: 11    Associated Diagnoses: Type 2 diabetes mellitus without complication, without long-term current use of insulin (H)      blood glucose monitoring (NO BRAND SPECIFIED) meter device kit Use to test blood sugar 1 times daily or as directed.  Qty: 1 kit, Refills: 0    Associated Diagnoses: Type 2 diabetes mellitus without complication, without long-term current use of insulin (H)         STOP taking these medications       magic mouthwash suspension, diphenhydrAMINE, lidocaine,  aluminum-magnesium & simethicone, (FIRST-MOUTHWASH BLM) compounding kit Comments:   Reason for Stopping:         ondansetron (ZOFRAN ODT) 4 MG ODT tab Comments:   Reason for Stopping:         penicillin V (VEETID) 500 MG tablet Comments:   Reason for Stopping:             Allergies   Allergies   Allergen Reactions     Ace Inhibitors Cough

## 2022-11-24 NOTE — TELEPHONE ENCOUNTER
Reason for Call:  Same Day Appointment, Requested Provider:  Needs to be seen next week sometime to follow new diabetes regimen    PCP: Carin Pepe    Reason for visit: Hosp follow up    Duration of symptoms: Epiglotitis, Hyperglycemia    Have you been treated for this in the past? Yes    Additional comments: [facility] calling to schedule Hosp follow up, Nothing available until 12/07/22. They said he needs to be seen next week because of his diabetes. Please call patient to schedule.     Can we leave a detailed message on this number? Not Applicable    Phone number patient can be reached at: Cell number on file:    Telephone Information:   Mobile [phone]       Best Time: ASAP    Call taken on 11/24/2022 at 10:09 AM by Louisa Rodriguez

## 2022-11-28 ENCOUNTER — PATIENT OUTREACH (OUTPATIENT)
Dept: CARE COORDINATION | Facility: CLINIC | Age: 52
End: 2022-11-28

## 2022-11-28 ENCOUNTER — TELEPHONE (OUTPATIENT)
Dept: FAMILY MEDICINE | Facility: CLINIC | Age: 52
End: 2022-11-28

## 2022-11-28 NOTE — PROGRESS NOTES
[facility]    Background: Transitional Care Management program identified per system criteria and reviewed by [facility] [place] team for possible outreach.    Assessment: Upon chart review, CCR Team member will not proceed with patient outreach related to this episode of Transitional Care Management program due to reason below:    Patient has active communication with a nurse, provider or care team for reason of post-hospital follow up plan.  Outreach call by [facility] team not indicated to minimize duplicative efforts.     Plan: Transitional Care Management episode addressed appropriately per reason noted above.      HANY Vanessa  [facility]    *Connected Care Resource Team does NOT follow patient ongoing. Referrals are identified based on internal discharge reports and the outreach is to ensure patient has an understanding of their discharge instructions.

## 2022-11-28 NOTE — TELEPHONE ENCOUNTER
He needs more insulin if blood sugars are still that high    Keep appointment tomorrow      -199: 3 units   -249: 6 units   -299: 9 units   -349: 12 units   BG Over 350: 15 units

## 2022-11-28 NOTE — TELEPHONE ENCOUNTER
Pt calls for hosp f/u appt and reports still feeling weak/lightheaded since discharge and bg are running high(200's-300's). I see pt had a medrol taper and high bg was expected. Pt states after using insulin his bg goes from 300's to 200's which is where he was at upon discharge.   Scheduled pt a f/u appt tomorrow with dr harris. Pt states no cp/sob.   Advised pt to go to ed with uncontrolled bg with insulin, nausea/vomiting/cp/sob or any concerning symptoms today    Sent to provider for maryan Begum RN

## 2022-11-28 NOTE — TELEPHONE ENCOUNTER
Pt was given new insulin sliding scale.  See below.  Pt verbalized understanding.  He will keep appt for tomorrow.

## 2022-11-29 ENCOUNTER — OFFICE VISIT (OUTPATIENT)
Dept: FAMILY MEDICINE | Facility: CLINIC | Age: 52
End: 2022-11-29
Payer: COMMERCIAL

## 2022-11-29 ENCOUNTER — HOSPITAL ENCOUNTER (EMERGENCY)
Facility: CLINIC | Age: 52
Discharge: HOME | End: 2022-11-29
Attending: FAMILY MEDICINE | Admitting: FAMILY MEDICINE
Payer: COMMERCIAL

## 2022-11-29 VITALS
WEIGHT: 218.2 LBS | TEMPERATURE: 97.1 F | SYSTOLIC BLOOD PRESSURE: 82 MMHG | HEART RATE: 118 BPM | HEIGHT: 72 IN | RESPIRATION RATE: 20 BRPM | DIASTOLIC BLOOD PRESSURE: 62 MMHG | BODY MASS INDEX: 29.55 KG/M2 | OXYGEN SATURATION: 97 %

## 2022-11-29 VITALS
SYSTOLIC BLOOD PRESSURE: 131 MMHG | OXYGEN SATURATION: 98 % | BODY MASS INDEX: 28.89 KG/M2 | RESPIRATION RATE: 16 BRPM | WEIGHT: 218 LBS | TEMPERATURE: 97.3 F | HEART RATE: 86 BPM | DIASTOLIC BLOOD PRESSURE: 85 MMHG | HEIGHT: 73 IN

## 2022-11-29 DIAGNOSIS — J05.10 EPIGLOTTITIS: ICD-10-CM

## 2022-11-29 DIAGNOSIS — E86.0 DEHYDRATION: ICD-10-CM

## 2022-11-29 DIAGNOSIS — E86.1 HYPOTENSION DUE TO HYPOVOLEMIA: Primary | ICD-10-CM

## 2022-11-29 DIAGNOSIS — R00.0 TACHYCARDIA: ICD-10-CM

## 2022-11-29 DIAGNOSIS — E11.9 TYPE 2 DIABETES MELLITUS WITHOUT COMPLICATION, WITH LONG-TERM CURRENT USE OF INSULIN (H): ICD-10-CM

## 2022-11-29 DIAGNOSIS — E11.9 TYPE 2 DIABETES MELLITUS WITHOUT COMPLICATION, WITHOUT LONG-TERM CURRENT USE OF INSULIN (H): ICD-10-CM

## 2022-11-29 DIAGNOSIS — E11.65 UNCONTROLLED TYPE 2 DIABETES MELLITUS WITH HYPERGLYCEMIA (H): ICD-10-CM

## 2022-11-29 DIAGNOSIS — R06.2 WHEEZING: ICD-10-CM

## 2022-11-29 DIAGNOSIS — E11.10: ICD-10-CM

## 2022-11-29 DIAGNOSIS — Z79.4 TYPE 2 DIABETES MELLITUS WITHOUT COMPLICATION, WITH LONG-TERM CURRENT USE OF INSULIN (H): ICD-10-CM

## 2022-11-29 DIAGNOSIS — R42 LIGHTHEADEDNESS: ICD-10-CM

## 2022-11-29 LAB
ALBUMIN SERPL BCG-MCNC: 3.8 G/DL (ref 3.5–5.2)
ALP SERPL-CCNC: 59 U/L (ref 40–129)
ALT SERPL W P-5'-P-CCNC: 41 U/L (ref 10–50)
ANION GAP SERPL CALCULATED.3IONS-SCNC: 13 MMOL/L (ref 7–15)
ANION GAP SERPL CALCULATED.3IONS-SCNC: 6 MMOL/L (ref 7–15)
ANION GAP SERPL CALCULATED.3IONS-SCNC: 7 MMOL/L (ref 7–15)
AST SERPL W P-5'-P-CCNC: 25 U/L (ref 10–50)
BASOPHILS # BLD MANUAL: 0.2 10E3/UL (ref 0–0.2)
BASOPHILS NFR BLD MANUAL: 2 %
BILIRUB SERPL-MCNC: 0.9 MG/DL
BUN SERPL-MCNC: 18.6 MG/DL (ref 6–20)
BUN SERPL-MCNC: 28.5 MG/DL (ref 6–20)
BUN SERPL-MCNC: 33.9 MG/DL (ref 6–20)
CALCIUM SERPL-MCNC: 4 MG/DL (ref 8.6–10)
CALCIUM SERPL-MCNC: 8.1 MG/DL (ref 8.6–10)
CALCIUM SERPL-MCNC: 9 MG/DL (ref 8.6–10)
CHLORIDE SERPL-SCNC: 100 MMOL/L (ref 98–107)
CHLORIDE SERPL-SCNC: 118 MMOL/L (ref 98–107)
CHLORIDE SERPL-SCNC: 94 MMOL/L (ref 98–107)
CREAT SERPL-MCNC: 0.53 MG/DL (ref 0.67–1.17)
CREAT SERPL-MCNC: 1.07 MG/DL (ref 0.67–1.17)
CREAT SERPL-MCNC: 1.51 MG/DL (ref 0.67–1.17)
DEPRECATED HCO3 PLAS-SCNC: 14 MMOL/L (ref 22–29)
DEPRECATED HCO3 PLAS-SCNC: 21 MMOL/L (ref 22–29)
DEPRECATED HCO3 PLAS-SCNC: 26 MMOL/L (ref 22–29)
EOSINOPHIL # BLD MANUAL: 0.1 10E3/UL (ref 0–0.7)
EOSINOPHIL NFR BLD MANUAL: 1 %
ERYTHROCYTE [DISTWIDTH] IN BLOOD BY AUTOMATED COUNT: 13.2 % (ref 10–15)
GFR SERPL CREATININE-BSD FRML MDRD: 55 ML/MIN/1.73M2
GFR SERPL CREATININE-BSD FRML MDRD: 83 ML/MIN/1.73M2
GFR SERPL CREATININE-BSD FRML MDRD: >90 ML/MIN/1.73M2
GLUCOSE BLDC GLUCOMTR-MCNC: 335 MG/DL (ref 70–99)
GLUCOSE SERPL-MCNC: 119 MG/DL (ref 70–99)
GLUCOSE SERPL-MCNC: 184 MG/DL (ref 70–99)
GLUCOSE SERPL-MCNC: 331 MG/DL (ref 70–99)
HCT VFR BLD AUTO: 46.9 % (ref 40–53)
HGB BLD-MCNC: 16.3 G/DL (ref 13.3–17.7)
HOLD SPECIMEN: NORMAL
LYMPHOCYTES # BLD MANUAL: 2.2 10E3/UL (ref 0.8–5.3)
LYMPHOCYTES NFR BLD MANUAL: 23 %
MCH RBC QN AUTO: 28.4 PG (ref 26.5–33)
MCHC RBC AUTO-ENTMCNC: 34.8 G/DL (ref 31.5–36.5)
MCV RBC AUTO: 82 FL (ref 78–100)
MONOCYTES # BLD MANUAL: 0.5 10E3/UL (ref 0–1.3)
MONOCYTES NFR BLD MANUAL: 5 %
NEUTROPHILS # BLD MANUAL: 6.5 10E3/UL (ref 1.6–8.3)
NEUTROPHILS NFR BLD MANUAL: 69 %
PLAT MORPH BLD: NORMAL
PLATELET # BLD AUTO: 309 10E3/UL (ref 150–450)
POTASSIUM SERPL-SCNC: 3.8 MMOL/L (ref 3.4–5.3)
POTASSIUM SERPL-SCNC: 4.8 MMOL/L (ref 3.4–5.3)
POTASSIUM SERPL-SCNC: 5.3 MMOL/L (ref 3.4–5.3)
PROT SERPL-MCNC: 7.7 G/DL (ref 6.4–8.3)
RBC # BLD AUTO: 5.74 10E6/UL (ref 4.4–5.9)
RBC MORPH BLD: NORMAL
SODIUM SERPL-SCNC: 128 MMOL/L (ref 136–145)
SODIUM SERPL-SCNC: 133 MMOL/L (ref 136–145)
SODIUM SERPL-SCNC: 138 MMOL/L (ref 136–145)
WBC # BLD AUTO: 9.4 10E3/UL (ref 4–11)

## 2022-11-29 PROCEDURE — 85007 BL SMEAR W/DIFF WBC COUNT: CPT | Performed by: FAMILY MEDICINE

## 2022-11-29 PROCEDURE — 99284 EMERGENCY DEPT VISIT MOD MDM: CPT | Mod: 25 | Performed by: FAMILY MEDICINE

## 2022-11-29 PROCEDURE — 258N000003 HC RX IP 258 OP 636: Performed by: EMERGENCY MEDICINE

## 2022-11-29 PROCEDURE — 80053 COMPREHEN METABOLIC PANEL: CPT | Performed by: FAMILY MEDICINE

## 2022-11-29 PROCEDURE — 250N000012 HC RX MED GY IP 250 OP 636 PS 637: Performed by: FAMILY MEDICINE

## 2022-11-29 PROCEDURE — 93010 ELECTROCARDIOGRAM REPORT: CPT | Performed by: FAMILY MEDICINE

## 2022-11-29 PROCEDURE — 96361 HYDRATE IV INFUSION ADD-ON: CPT | Performed by: FAMILY MEDICINE

## 2022-11-29 PROCEDURE — 93005 ELECTROCARDIOGRAM TRACING: CPT | Performed by: FAMILY MEDICINE

## 2022-11-29 PROCEDURE — 99215 OFFICE O/P EST HI 40 MIN: CPT | Performed by: FAMILY MEDICINE

## 2022-11-29 PROCEDURE — 80048 BASIC METABOLIC PNL TOTAL CA: CPT | Performed by: FAMILY MEDICINE

## 2022-11-29 PROCEDURE — 36415 COLL VENOUS BLD VENIPUNCTURE: CPT | Performed by: PHYSICIAN ASSISTANT

## 2022-11-29 PROCEDURE — 96360 HYDRATION IV INFUSION INIT: CPT | Performed by: FAMILY MEDICINE

## 2022-11-29 PROCEDURE — 258N000003 HC RX IP 258 OP 636: Performed by: FAMILY MEDICINE

## 2022-11-29 PROCEDURE — 99284 EMERGENCY DEPT VISIT MOD MDM: CPT | Performed by: FAMILY MEDICINE

## 2022-11-29 PROCEDURE — 36415 COLL VENOUS BLD VENIPUNCTURE: CPT | Performed by: FAMILY MEDICINE

## 2022-11-29 PROCEDURE — 96372 THER/PROPH/DIAG INJ SC/IM: CPT | Performed by: FAMILY MEDICINE

## 2022-11-29 PROCEDURE — 85014 HEMATOCRIT: CPT | Performed by: FAMILY MEDICINE

## 2022-11-29 RX ORDER — LIDOCAINE HYDROCHLORIDE 20 MG/ML
SOLUTION OROPHARYNGEAL
COMMUNITY
Start: 2022-11-21 | End: 2022-11-29

## 2022-11-29 RX ADMIN — SODIUM CHLORIDE 1000 ML: 9 INJECTION, SOLUTION INTRAVENOUS at 13:25

## 2022-11-29 RX ADMIN — INSULIN ASPART 6 UNITS: 100 INJECTION, SOLUTION INTRAVENOUS; SUBCUTANEOUS at 13:25

## 2022-11-29 RX ADMIN — SODIUM CHLORIDE 1000 ML: 9 INJECTION, SOLUTION INTRAVENOUS at 11:57

## 2022-11-29 ASSESSMENT — PATIENT HEALTH QUESTIONNAIRE - PHQ9
SUM OF ALL RESPONSES TO PHQ QUESTIONS 1-9: 8
SUM OF ALL RESPONSES TO PHQ QUESTIONS 1-9: 8
10. IF YOU CHECKED OFF ANY PROBLEMS, HOW DIFFICULT HAVE THESE PROBLEMS MADE IT FOR YOU TO DO YOUR WORK, TAKE CARE OF THINGS AT HOME, OR GET ALONG WITH OTHER PEOPLE: SOMEWHAT DIFFICULT

## 2022-11-29 ASSESSMENT — ACTIVITIES OF DAILY LIVING (ADL)
ADLS_ACUITY_SCORE: 35

## 2022-11-29 ASSESSMENT — PAIN SCALES - GENERAL: PAINLEVEL: MILD PAIN (3)

## 2022-11-29 NOTE — PROGRESS NOTES
Assessment & Plan     Hypotension due to hypovolemia  Lightheadedness  Tachycardia  Uncontrolled type 2 diabetes mellitus with hyperglycemia (H)  Wheezing    Patient came in for hospital follow up.  Upon rooming in patient has complained he has been feeling lighteheaded, weak and just wanting to sleep.  His blood pressures have been consistently hypotensive. He has been tachycardic. However, afebrile and not tachypneic.  His skin turgor is slightly lagging and buccal mucosae looked a little dry.  He is hyperglycemic  Patient has symptomatic hypotension, likely due to hypovolemia. Can't rule out ketosis.  Due to likely need for at least IV hydration, and more extensive work up, transfer to ER was advised.  Patient agreed.  Report given to ER charge RN who concurred to have patient brought to the ER.  Patient was brought to ER by [facility] via wheelchair.      MED REC REQUIRED  Post Medication Reconciliation Status: discharge medications reconciled, continue medications without change    There are no Patient Instructions on file for this visit.    No follow-ups on file.    Favian Cole MD  [facility]    Vern Caraballo is a 52 year old, presenting for the following health issues:  Hospital F/U (Pt being seen for post hospital follow up.)      History of Present Illness       Reason for visit:  Hospital follow up    He eats 2-3 servings of fruits and vegetables daily.He consumes 0 sweetened beverage(s) daily.He exercises with enough effort to increase his heart rate 9 or less minutes per day.  He exercises with enough effort to increase his heart rate 3 or less days per week.   He is taking medications regularly.    Today's PHQ-9         PHQ-9 Total Score: 8    PHQ-9 Q9 Thoughts of better off dead/self-harm past 2 weeks :   Not at all    How difficult have these problems made it for you to do your work, take care of things at home, or get along with other people: Somewhat difficult    "      Hospital Follow-up Visit:    Hospital/Nursing Home/IP Rehab Facility: [facility]  Date of Admission: 11/21/22  Date of Discharge: 11/24/22  Reason(s) for Admission: epiglottitis, strep, influenza A    Was your hospitalization related to COVID-19? No   Problems taking medications regularly:  None  Medication changes since discharge: albuterol, augmentin, novolog insulin, metformin, prednisone  Problems adhering to non-medication therapy:  Does not like metformin, metal taste in his mouth    Summary of hospitalization:  [facility] discharge summary reviewed  Diagnostic Tests/Treatments reviewed.  Follow up needed: none  Other Healthcare Providers Involved in Patient s Care:         None  Update since discharge: worsened.     Patient c/o lightheadedness, feeling weak, \"just not right\", \"just want to go to sleep\" and dry mouth.  He said he did not eat breakfast today and gave himself his morning insulin dose.  He said his glucose this morning was \"high\" - could not remember exact nuber.  He brought  Glucometer today - reviewed - glucose 200's-300's.  Patient was on prednisone and was advised in the hospital that his blood glucose levels could be higher but expected to return to normal range once prednisone course is done.  Was diagnosed with ketosis in the hospital, last measurement on discharge was normal.    He said he went to work for 3 hours today before his clinic appointment now.    Plan of care communicated with patient             Review of Systems   Constitutional, HEENT, cardiovascular, pulmonary, GI, , musculoskeletal, neuro, skin, endocrine and psych systems are negative, except as otherwise noted.      Objective    BP (!) 80/57   Pulse 116   Temp 97.1  F (36.2  C) (Tympanic)   Resp 20   Ht 1.829 m (6')   Wt 99 kg (218 lb 3.2 oz)   SpO2 97%   BMI 29.59 kg/m    Body mass index is 29.59 kg/m .   Measured glucose using patient's glucometer at time of exam: " 287  Physical Exam   GENERAL: overweight, alert but in mild distress, ambulatory w/o assist but cautious when moving  MOUTH: slightly dry buccal mucosa  NECK: no tenderness, no adenopathy,  Thyroid not enlarged  RESP: good air entry all lung fields; mild inspiratory and expeiratory wheezing left mid lung field; no crackles/rales  CV: tachycardic, regular rhythm, no murmur, peripheral pulses not thready  MS: no edema  SKIN: slightly decreased skin turgor,  no suspicious lesions, no rashes  NEURO: CN grossly intact, no tremors, strength and tone- normal, sensory exam- grossly normal, mentation- intact, speech- normal, reflexes- symmetric  ABD: rounded nontender    Admission on 11/21/2022, Discharged on 11/24/2022   Component Date Value Ref Range Status     GLUCOSE BY METER POCT 11/21/2022 355 (H)  70 - 99 mg/dL Final     Sodium 11/22/2022 134  133 - 144 mmol/L Final     Potassium 11/22/2022 3.8  3.4 - 5.3 mmol/L Final     Chloride 11/22/2022 102  94 - 109 mmol/L Final     Carbon Dioxide (CO2) 11/22/2022 24  20 - 32 mmol/L Final     Anion Gap 11/22/2022 8  3 - 14 mmol/L Final     Urea Nitrogen 11/22/2022 23  7 - 30 mg/dL Final     Creatinine 11/22/2022 0.59 (L)  0.66 - 1.25 mg/dL Final     Calcium 11/22/2022 7.8 (L)  8.5 - 10.1 mg/dL Final     Glucose 11/22/2022 277 (H)  70 - 99 mg/dL Final     GFR Estimate 11/22/2022 >90  >60 mL/min/1.73m2 Final    Effective December 21, 2021 eGFRcr in adults is calculated using the 2021 CKD-EPI creatinine equation which includes age and gender (Radha et al., NEJM, DOI: 10.1056/CKZPys7539611)     WBC Count 11/22/2022 6.6  4.0 - 11.0 10e3/uL Final     RBC Count 11/22/2022 4.26 (L)  4.40 - 5.90 10e6/uL Final     Hemoglobin 11/22/2022 12.3 (L)  13.3 - 17.7 g/dL Final     Hematocrit 11/22/2022 34.7 (L)  40.0 - 53.0 % Final     MCV 11/22/2022 82  78 - 100 fL Final     MCH 11/22/2022 28.9  26.5 - 33.0 pg Final     MCHC 11/22/2022 35.4  31.5 - 36.5 g/dL Final     RDW 11/22/2022 13.0  10.0 -  15.0 % Final     Platelet Count 11/22/2022 118 (L)  150 - 450 10e3/uL Final     Hemoglobin A1C 11/22/2022 8.9 (H)  0.0 - 5.6 % Final    Normal <5.7%   Prediabetes 5.7-6.4%    Diabetes 6.5% or higher     Note: Adopted from ADA consensus guidelines.     GLUCOSE BY METER POCT 11/22/2022 320 (H)  70 - 99 mg/dL Final     GLUCOSE BY METER POCT 11/22/2022 308 (H)  70 - 99 mg/dL Final     Ketone (Beta-Hydroxybutyrate) Oleg* 11/22/2022 1.5 (H)  0.0 - 0.6 mmol/L Final     GLUCOSE BY METER POCT 11/22/2022 310 (H)  70 - 99 mg/dL Final     GLUCOSE BY METER POCT 11/22/2022 400 (H)  70 - 99 mg/dL Final     Ketone (Beta-Hydroxybutyrate) Oleg* 11/22/2022 0.7 (H)  0.0 - 0.6 mmol/L Final     GLUCOSE BY METER POCT 11/22/2022 348 (H)  70 - 99 mg/dL Final     Ketone (Beta-Hydroxybutyrate) Oleg* 11/23/2022 1.0 (H)  0.0 - 0.6 mmol/L Final     pH Venous 11/23/2022 7.40  7.32 - 7.43 Final     pCO2 Venous 11/23/2022 42  40 - 50 mm Hg Final     pO2 Venous 11/23/2022 33  25 - 47 mm Hg Final     Bicarbonate Venous 11/23/2022 26  21 - 28 mmol/L Final     Base Excess/Deficit (+/-) 11/23/2022 1.0  -7.7 - 1.9 mmol/L Final     FIO2 11/23/2022 21   Final     Sodium 11/23/2022 137  133 - 144 mmol/L Final     Potassium 11/23/2022 4.3  3.4 - 5.3 mmol/L Final     Chloride 11/23/2022 104  94 - 109 mmol/L Final     Carbon Dioxide (CO2) 11/23/2022 26  20 - 32 mmol/L Final     Anion Gap 11/23/2022 7  3 - 14 mmol/L Final     Urea Nitrogen 11/23/2022 26  7 - 30 mg/dL Final     Creatinine 11/23/2022 0.67  0.66 - 1.25 mg/dL Final     Calcium 11/23/2022 7.7 (L)  8.5 - 10.1 mg/dL Final     Glucose 11/23/2022 310 (H)  70 - 99 mg/dL Final     Alkaline Phosphatase 11/23/2022 48  40 - 150 U/L Final     AST 11/23/2022 16  0 - 45 U/L Final     ALT 11/23/2022 25  0 - 70 U/L Final     Protein Total 11/23/2022 6.4 (L)  6.8 - 8.8 g/dL Final     Albumin 11/23/2022 2.3 (L)  3.4 - 5.0 g/dL Final     Bilirubin Total 11/23/2022 0.6  0.2 - 1.3 mg/dL Final     GFR Estimate  11/23/2022 >90  >60 mL/min/1.73m2 Final    Effective December 21, 2021 eGFRcr in adults is calculated using the 2021 CKD-EPI creatinine equation which includes age and gender (Radha et al., [facility], DOI: 10.[facility]6/[id number])     WBC Count 11/23/2022 9.9  4.0 - 11.0 10e3/uL Final     RBC Count 11/23/2022 4.37 (L)  4.40 - 5.90 10e6/uL Final     Hemoglobin 11/23/2022 12.7 (L)  13.3 - 17.7 g/dL Final     Hematocrit 11/23/2022 36.2 (L)  40.0 - 53.0 % Final     MCV 11/23/2022 83  78 - 100 fL Final     MCH 11/23/2022 29.1  26.5 - 33.0 pg Final     MCHC 11/23/2022 35.1  31.5 - 36.5 g/dL Final     RDW 11/23/2022 13.0  10.0 - 15.0 % Final     Platelet Count 11/23/2022 153  150 - 450 10e3/uL Final     GLUCOSE BY METER POCT 11/23/2022 301 (H)  70 - 99 mg/dL Final     GLUCOSE BY METER POCT 11/23/2022 356 (H)  70 - 99 mg/dL Final     Ketone (Beta-Hydroxybutyrate) Oleg* 11/23/2022 0.8 (H)  0.0 - 0.6 mmol/L Final     GLUCOSE BY METER POCT 11/23/2022 338 (H)  70 - 99 mg/dL Final     GLUCOSE BY METER POCT 11/23/2022 345 (H)  70 - 99 mg/dL Final     GLUCOSE BY METER POCT 11/23/2022 321 (H)  70 - 99 mg/dL Final     Platelet Count 11/24/2022 173  150 - 450 10e3/uL Final     Ketone (Beta-Hydroxybutyrate) Oleg* 11/24/2022 0.3  0.0 - 0.6 mmol/L Final     Sodium 11/24/2022 135  133 - 144 mmol/L Final     Potassium 11/24/2022 4.4  3.4 - 5.3 mmol/L Final     Chloride 11/24/2022 105  94 - 109 mmol/L Final     Carbon Dioxide (CO2) 11/24/2022 24  20 - 32 mmol/L Final     Anion Gap 11/24/2022 6  3 - 14 mmol/L Final     Urea Nitrogen 11/24/2022 26  7 - 30 mg/dL Final     Creatinine 11/24/2022 0.71  0.66 - 1.25 mg/dL Final     Calcium 11/24/2022 7.8 (L)  8.5 - 10.1 mg/dL Final     Glucose 11/24/2022 301 (H)  70 - 99 mg/dL Final     GFR Estimate 11/24/2022 >90  >60 mL/min/1.73m2 Final    Effective December 21, 2021 eGFRcr in adults is calculated using the 2021 CKD-EPI creatinine equation which includes age and gender (Radha et al., NEJM, DOI:  10.1056/MNNWtf8612415)     GLUCOSE BY METER POCT 11/24/2022 317 (H)  70 - 99 mg/dL Final     GLUCOSE BY METER POCT 11/24/2022 275 (H)  70 - 99 mg/dL Final

## 2022-11-29 NOTE — ED PROVIDER NOTES
History     Chief Complaint   Patient presents with     Hyperglycemia     High blood sugar, states he was recently hospitalized, concerned about dehydration, feels light headed; BP 80/57 in clinic     HPI  Ilya Villagran is a 52 year old male who was sent over from the primary care clinic where he presented today for follow-up of recent hospitalization.  He was hospitalized due to epiglottitis with a positive strep test from his throat and also influenza A.  He has diabetes and was hyperglycemic.  He had been managing his diabetes with diet therapy only.  During his hospitalization he was started on metformin and is taking 1000 mg once daily.  He was discharged on insulin to use rapid acting sliding scale insulin and has been using 3 to 6 units of insulin with each meal since discharge.  Despite this his blood sugars have been quite high.  They generally are ranging around 300.  He was sent over from the primary care clinic because his blood pressure was 80/50 in the clinic today.  He reports that he is trying to keep up on his fluids and drink water but has difficulty doing this.  He is not having any trouble swallowing and his sore throat is better.  He is not having any trouble breathing.  He does not had any fevers.  He is now off of steroids for the past 2 days.  He is still on antibiotics and on metformin.     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) 11/22/2022     Priority: Medium     Epiglottitis 11/21/2022     Priority: Medium     Acute streptococcal pharyngitis 11/21/2022     Priority: Medium     Influenza A 11/21/2022     Priority: Medium     Uncontrolled type 2 diabetes mellitus with hyperglycemia (H) 11/21/2022     Priority: Medium     Hyponatremia 11/21/2022     Priority: Medium     Anxiety and depression 02/19/2020     Priority: Medium     Flexural eczema  09/28/2018     Priority: Medium     Chronic cough 09/28/2018     Priority: Medium     Type 2 diabetes mellitus without complication, without long-term current use of insulin (H) 01/12/2018     Priority: Medium     Hyperglycemia 07/20/2017     Priority: Medium     Essential hypertension 07/19/2017     Priority: Medium        Past Medical History:    Past Medical History:   Diagnosis Date     Type 2 diabetes mellitus without complication, without long-term current use of insulin (H) 1/12/2018       Past Surgical History:    Past Surgical History:   Procedure Laterality Date     LAPAROSCOPIC APPENDECTOMY  12/18/2011    Procedure:LAPAROSCOPIC APPENDECTOMY; Surgeon:JEAN CLAUDE LUNA; Location:[place] OR       Family History:    Family History   Problem Relation Age of Onset     Diabetes Mother      Asthma Mother      Depression Brother      Myocardial Infarction Brother      Hypertension Cousin        Social History:  Marital Status:  Single [1]  Social History     Tobacco Use     Smoking status: Never     Smokeless tobacco: Never   Vaping Use     Vaping Use: Never used   Substance Use Topics     Alcohol use: Yes     Comment: 12 pack per week     Drug use: Not Currently     Comment: used in the past for 15 yrs        Medications:    acetaminophen (TYLENOL) 500 MG tablet  albuterol (PROAIR HFA/PROVENTIL HFA/VENTOLIN HFA) 108 (90 Base) MCG/ACT inhaler  amoxicillin-clavulanate (AUGMENTIN) 875-125 MG tablet  blood glucose monitoring (NO BRAND SPECIFIED) meter device kit  blood glucose monitoring (NO BRAND SPECIFIED) test strip  insulin aspart (NOVOLOG PEN) 100 UNIT/ML pen  insulin aspart (NOVOLOG PEN) 100 UNIT/ML pen  losartan (COZAAR) 100 MG tablet  metFORMIN (GLUCOPHAGE) 1000 MG tablet  sertraline (ZOLOFT) 50 MG tablet  blood glucose (NO BRAND SPECIFIED) lancets standard  insulin pen needle (30G X 8 MM) 30G X 8 MM miscellaneous  metFORMIN (GLUCOPHAGE) 1000 MG tablet          Review of Systems  All other systems are  "reviewed and are negative    Physical Exam   BP: (!) 77/56  Pulse: 107  Temp: 97.3  F (36.3  C)  Resp: 16  Height: 185.4 cm (6' 1\")  Weight: 98.9 kg (218 lb)  SpO2: 98 %      Physical Exam     Nursing note and vitals were reviewed.  Constitutional: Awake and alert, adequately nourished and developed appearing 52-year-old in no apparent discomfort, who does not appear acutely ill, and who answers questions appropriately and cooperates with examination.  HEENT: Atraumatic and normocephalic.  Oropharynx is normal with tonsils 0+ in size without exudate or asymmetry.  Voice quality is normal.  PERRL EOMI.   Neck: Freely mobile.  No adenopathy.  No meningismus.  No neck masses.  Cardiovascular: Cardiac examination reveals normal heart rate and regular rhythm without murmur.  Pulmonary/Chest: Breathing is unlabored.  Breath sounds are clear and equal bilaterally.  There no retractions, tachypnea, rales, wheezes, or rhonchi.  Abdomen: Soft, nontender, no HSM or masses rebound or guarding.  Musculoskeletal: Extremities are warm and well-perfused.  Neurological: Alert, oriented, thought content logical, coherent   Skin: Warm, dry, no rashes.  Psychiatric: Affect broad and appropriate.      ED Course                 Procedures                 EKG Interpretation:      Interpreted by Pawan Arenas MD  Time reviewed: 11:49  Symptoms at time of EKG: hypotension   Rhythm: normal sinus   Rate: 99  Axis: NORMAL  Ectopy: none  Conduction: Right bundle branch block  ST Segments/ T Waves: No ST-T wave changes  Q Waves: none  Comparison to prior: Unchanged from 11/21/22    Clinical Impression: Right bundle branch block, otherwise normal EKG    Critical Care time:  none               Results for orders placed or performed during the hospital encounter of 11/29/22 (from the past 24 hour(s))   [place] Draw    Narrative    The following orders were created for panel order [place] Draw.  Procedure                               Abnormality   "       Status                     ---------                               -----------         ------                     Extra Blue Top Tube[[id number]]                              Final result               Extra Red Top Tube[[id number]]                               Final result               Extra Green Top (Lithium...[[id number]]                      Final result               Extra Purple Top Tube[[id number]]                            Final result                 Please view results for these tests on the individual orders.   Extra Blue Top Tube   Result Value Ref Range    Hold Specimen JIC    Extra Red Top Tube   Result Value Ref Range    Hold Specimen JIC    Extra Green Top (Lithium Heparin) Tube   Result Value Ref Range    Hold Specimen JIC    Extra Purple Top Tube   Result Value Ref Range    Hold Specimen JIC    CBC with platelets, differential    Narrative    The following orders were created for panel order CBC with platelets, differential.  Procedure                               Abnormality         Status                     ---------                               -----------         ------                     CBC with platelets and d...[[id number]]  Normal              Final result               Manual Differential[[id number]]                              Final result                 Please view results for these tests on the individual orders.   Comprehensive metabolic panel   Result Value Ref Range    Sodium 128 (L) 136 - 145 mmol/L    Potassium 5.3 3.4 - 5.3 mmol/L    Chloride 94 (L) 98 - 107 mmol/L    Carbon Dioxide (CO2) 21 (L) 22 - 29 mmol/L    Anion Gap 13 7 - 15 mmol/L    Urea Nitrogen 33.9 (H) 6.0 - 20.0 mg/dL    Creatinine 1.51 (H) 0.67 - 1.17 mg/dL    Calcium 9.0 8.6 - 10.0 mg/dL    Glucose 331 (H) 70 - 99 mg/dL    Alkaline Phosphatase 59 40 - 129 U/L    AST 25 10 - 50 U/L    ALT 41 10 - 50 U/L    Protein Total 7.7 6.4 - 8.3 g/dL    Albumin 3.8 3.5 - 5.2 g/dL    Bilirubin Total 0.9 <=1.2 mg/dL     GFR Estimate 55 (L) >60 mL/min/1.73m2   CBC with platelets and differential   Result Value Ref Range    WBC Count 9.4 4.0 - 11.0 10e3/uL    RBC Count 5.74 4.40 - 5.90 10e6/uL    Hemoglobin 16.3 13.3 - 17.7 g/dL    Hematocrit 46.9 40.0 - 53.0 %    MCV 82 78 - 100 fL    MCH 28.4 26.5 - 33.0 pg    MCHC 34.8 31.5 - 36.5 g/dL    RDW 13.2 10.0 - 15.0 %    Platelet Count 309 150 - 450 10e3/uL   Manual Differential   Result Value Ref Range    % Neutrophils 69 %    % Lymphocytes 23 %    % Monocytes 5 %    % Eosinophils 1 %    % Basophils 2 %    Absolute Neutrophils 6.5 1.6 - 8.3 10e3/uL    Absolute Lymphocytes 2.2 0.8 - 5.3 10e3/uL    Absolute Monocytes 0.5 0.0 - 1.3 10e3/uL    Absolute Eosinophils 0.1 0.0 - 0.7 10e3/uL    Absolute Basophils 0.2 0.0 - 0.2 10e3/uL    RBC Morphology Confirmed RBC Indices     Platelet Assessment  Automated Count Confirmed. Platelet morphology is normal.     Automated Count Confirmed. Platelet morphology is normal.   Glucose by meter   Result Value Ref Range    GLUCOSE BY METER POCT 335 (H) 70 - 99 mg/dL   Basic metabolic panel   Result Value Ref Range    Sodium 138 136 - 145 mmol/L    Potassium 4.8 3.4 - 5.3 mmol/L    Chloride 118 (H) 98 - 107 mmol/L    Carbon Dioxide (CO2) 14 (L) 22 - 29 mmol/L    Anion Gap 6 (L) 7 - 15 mmol/L    Urea Nitrogen 18.6 6.0 - 20.0 mg/dL    Creatinine 0.53 (L) 0.67 - 1.17 mg/dL    Calcium 4.0 (LL) 8.6 - 10.0 mg/dL    Glucose 119 (H) 70 - 99 mg/dL    GFR Estimate >90 >60 mL/min/1.73m2       Medications   0.9% sodium chloride BOLUS (0 mLs Intravenous Stopped 11/29/22 1325)   0.9% sodium chloride BOLUS (0 mLs Intravenous Stopped 11/29/22 1607)   insulin aspart (NovoLOG) injection (RAPID ACTING) (6 Units Subcutaneous Given 11/29/22 1325)       Assessments & Plan (with Medical Decision Making)     52-year-old male presented from the clinic where he was being seen for follow-up of his recent hospitalization for epiglottitis, strep, influenza and hyperglycemia in the  setting of type 2 diabetes.  He was moderately dehydrated clinically and by laboratory criteria and received 2 L of fluid with normalization of his blood pressures.  A repeat basic metabolic profile was obtained because of the increase in his BUN and creatinine and decreased sodium but the result is clearly spurious and most likely contaminated by the normal saline because the results are not credible.  They show very high chloride and an unreasonably low calcium which would not likely be possible given the fluids that were administered and the time course.  I requested a repeat basic metabolic profile but the patient told the nurse that he refused to stay and left AGAINST MEDICAL ADVICE without completing the evaluation.    Prior to this I discussed with him that he should increase his metformin to 1000 mg twice daily and continue his sliding scale insulin.  He is completed his steroid therapy and hopefully blood sugars will improve because of this.  He had no signs or symptoms of airway obstruction and appears to be recovering uneventfully from the epiglottitis, influenza, strep.    I have also told him prior to this that I wanted him to hold his losartan for the remainder of the week and if that he felt back to normal by the weekend he could resume taking it.  Ideally he would get a blood pressure cuff and use that to help guide therapy but he currently does not have 1.    I have reviewed the nursing notes.    I have reviewed the findings, diagnosis, plan and need for follow up with the patient.       New Prescriptions    No medications on file       Final diagnoses:   Dehydration   Type 2 diabetes mellitus without complication, with long-term current use of insulin (H)   Epiglottitis       11/29/2022   [facility] EMERGENCY DEPT     Pawan Arenas MD  11/29/22 1735       Pawan Arenas MD  12/07/22 0037

## 2022-11-29 NOTE — ED NOTES
Pt states he does not want to be here anymore. Importance of testing stressed to patient. Pt insists on leaving AMA, without providers recommendations.

## 2022-11-29 NOTE — ED TRIAGE NOTES
High blood sugar, states he was recently hospitalized, concerned about dehydration, feels light headed; BP 80/57 in clinic     Triage Assessment     Row Name 11/29/22 1139       Triage Assessment (Adult)    Airway WDL WDL       Cardiac WDL    Cardiac WDL WDL       Cognitive/Neuro/Behavioral WDL    Cognitive/Neuro/Behavioral WDL WDL

## 2022-11-30 ENCOUNTER — TELEPHONE (OUTPATIENT)
Dept: EMERGENCY MEDICINE | Facility: CLINIC | Age: 52
End: 2022-11-30

## 2022-11-30 DIAGNOSIS — E11.65 UNCONTROLLED TYPE 2 DIABETES MELLITUS WITH HYPERGLYCEMIA (H): ICD-10-CM

## 2022-11-30 NOTE — TELEPHONE ENCOUNTER
.Prior Authorization Retail Medication Request    Medication/Dose: Insulin aspart flexpen 100unit/ml (Novolog Flexpen)   ICD code (if different than what is on RX):    Previously Tried and Failed:    Rationale:      Insurance Name:   COMMERCIAL  Insurance ID: [id number]      Pharmacy Information (if different than what is on RX)  Name:   Phone:     .Thank You,   Karla Jefferson, [facility] [place]

## 2022-12-01 DIAGNOSIS — E11.9 TYPE 2 DIABETES MELLITUS WITHOUT COMPLICATION, WITHOUT LONG-TERM CURRENT USE OF INSULIN (H): ICD-10-CM

## 2022-12-01 NOTE — TELEPHONE ENCOUNTER
Patient was in ICU 11/29 per spouse. Needs accucheck test strips and lancets ASAP to [facility] pharmacy please.    Thank you,    Louisa Bay, [facility]

## 2022-12-01 NOTE — TELEPHONE ENCOUNTER
Central Prior Authorization Team - Phone: [phone]     PA Initiation    Medication: Insulin aspart flexpen 100unit/ml (Novolog Flexpen) - PA INITIATED  Insurance Company:    Pharmacy Filling the Rx: [place], [place] - [address] [place] [address]  Filling Pharmacy Phone: [phone]  Filling Pharmacy Fax:    Start Date: 12/1/2022

## 2022-12-01 NOTE — TELEPHONE ENCOUNTER
Central Prior Authorization Team - Phone: [phone]     PRIOR AUTHORIZATION DENIED    Medication: Insulin aspart flexpen 100unit/ml (Novolog Flexpen) - PA DENIED    Denial Date: 12/1/2022    Denial Rational: Must have tried and failed Insulin Lispro products, include brand or generic Humalog or Humalog Mix and are covered without prior authorization.                        Appeal Information:  If the provider would like to appeal, please provide a letter of medical necessity and route back to the team. Otherwise you can close the encounter. Thank you, Central PA Team

## 2022-12-02 RX ORDER — INSULIN LISPRO 100 [IU]/ML
INJECTION, SOLUTION INTRAVENOUS; SUBCUTANEOUS
Qty: 15 ML | Refills: 2 | Status: SHIPPED | OUTPATIENT
Start: 2022-12-02 | End: 2024-02-06

## 2022-12-02 RX ORDER — INSULIN LISPRO 100 [IU]/ML
INJECTION, SOLUTION INTRAVENOUS; SUBCUTANEOUS
COMMUNITY
Start: 2022-12-02 | End: 2024-02-06

## 2022-12-02 NOTE — TELEPHONE ENCOUNTER
DR. Pepe  PA for novolog has been denied.    Denial Rational: Must have tried and failed Insulin Lispro products, include brand or generic Humalog or Humalog Mix and are covered without prior authorization.

## 2022-12-09 ENCOUNTER — OFFICE VISIT (OUTPATIENT)
Dept: OTOLARYNGOLOGY | Facility: CLINIC | Age: 52
End: 2022-12-09
Payer: COMMERCIAL

## 2022-12-09 VITALS
TEMPERATURE: 98.3 F | SYSTOLIC BLOOD PRESSURE: 144 MMHG | DIASTOLIC BLOOD PRESSURE: 93 MMHG | HEART RATE: 98 BPM | OXYGEN SATURATION: 99 %

## 2022-12-09 DIAGNOSIS — Z87.09 HISTORY OF STREP PHARYNGITIS: ICD-10-CM

## 2022-12-09 DIAGNOSIS — Z87.09: Primary | ICD-10-CM

## 2022-12-09 DIAGNOSIS — Z87.09 HISTORY OF INFLUENZA: ICD-10-CM

## 2022-12-09 PROCEDURE — 99202 OFFICE O/P NEW SF 15 MIN: CPT | Performed by: OTOLARYNGOLOGY

## 2022-12-09 ASSESSMENT — PAIN SCALES - GENERAL: PAINLEVEL: NO PAIN (0)

## 2022-12-09 NOTE — LETTER
12/9/2022         RE: Ilya Villagran  [address] [facility] [address]        Dear Colleague,    Thank you for referring your patient, Ilya Villagran, to the [facility]. Please see a copy of my visit note below.    Chief Complaint   Patient presents with     Consult     History of Present Illness  Ilya Villagran is a 52 year old male who presents today for follow-up.  I evaluated the patient on 11/21/2022 with epiglottitis.  He required transfer to [facility] due to bed availability and was observed there in the ICU.  He eventually discharged on 11/24/2022.  He was seen again in the ER on 11/29/2022 to receive IV fluids for some hypotension after his recent illness.  They wanted to repeat of blood sodium but he left AMA.  He returns today for follow-up.    From a symptom standpoint, the patient reports significant interval improvement in his symptoms.  He was having some significant blood sugar elevations on the steroids which have resolved.  He is taking metformin and his blood sugars have been much better.  He denies any swallowing difficulty.  He does report some mild hoarseness to his voice but overall improved.  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 coma (H)     Current Medications    Current Outpatient Medications:      acetaminophen (TYLENOL) 500 MG tablet, Take 1-2 tablets (500-1,000 mg) by mouth every 6 hours as needed for mild pain, Disp: , Rfl:      albuterol (PROAIR HFA/PROVENTIL HFA/VENTOLIN HFA) 108 (90  Base) MCG/ACT inhaler, Inhale 2 puffs into the lungs every 4 hours as needed for wheezing, Disp: 18 g, Rfl:      blood glucose (NO BRAND SPECIFIED) lancets standard, by In Vitro route 4 times daily, Disp: 100 each, Rfl: 11     blood glucose (NO BRAND SPECIFIED) test strip, 1 strip by In Vitro route 4 times daily, Disp: 100 strip, Rfl: 3     blood glucose monitoring (NO BRAND SPECIFIED) meter device kit, Use to test blood sugar 1 times daily or as directed., Disp: 1 kit, Rfl: 0     insulin lispro (HUMALOG KWIKPEN) 100 UNIT/ML (1 unit dial) KWIKPEN, Inject 1-10 Units Subcutaneous 3 times daily (before meals) To be given with prandial insulin, and based on pre-meal blood glucose. Do Not give Correction Insulin if Pre-Meal BG less than 140. For Pre-Meal  - 164 give 1 unit. For Pre-Meal  - 189 give 2 units. For Pre-Meal  - 214 give 3 units. For Pre-Meal  - 239 give 4 units. For Pre-Meal  - 264 give 5 units. For Pre-Meal  - 289 give 6 units. For Pre-Meal  - 314 give 7 units. For Pre-Meal  - 339 give 8 units. For Pre-Meal  - 364 give 9 units.  For Pre-Meal BG greater than or equal to 365 give 10 units, Disp: 15 mL, Rfl: 2     insulin lispro (HUMALOG KWIKPEN) 100 UNIT/ML (1 unit dial) KWIKPEN, Inject 1-7 Units Subcutaneous At Bedtime HIGH INSULIN RESISTANCE DOSING   Do Not give Bedtime Correction Insulin if BG less than 200.  For  - 224 give 1 units.  For  - 249 give 2 units.  For  - 274 give 3 units.  For  - 299 give 4 units.  For  - 324 give 5 units.  For  - 349 give 6 units.  For BG greater than or equal to 350 give 7 units., Disp: , Rfl:      insulin pen needle (30G X 8 MM) 30G X 8 MM miscellaneous, Use 4 pen needles daily or as directed., Disp: 100 each, Rfl: 0     losartan (COZAAR) 100 MG tablet, Take 1 tablet (100 mg) by mouth daily, Disp: 90 tablet, Rfl: 3     metFORMIN (GLUCOPHAGE) 1000 MG tablet, Take 1 tablet (1,000 mg) by  mouth 2 times daily (with meals), Disp: 30 tablet, Rfl: 0     sertraline (ZOLOFT) 50 MG tablet, Take 1 tablet (50 mg) by mouth daily, Disp: 90 tablet, Rfl: 3     metFORMIN (GLUCOPHAGE) 1000 MG tablet, Take 1 tablet (1,000 mg) by mouth daily (with dinner) for 1 dose, Disp: 1 tablet, Rfl: 0    Allergies  Allergies   Allergen Reactions     Ace Inhibitors Cough       Social History  Social History     Socioeconomic History     Marital status: Single     Spouse name: None     Number of children: None     Years of education: None     Highest education level: None   Occupational History     Occupation: /[employer] service     Employer: [employer]   Tobacco Use     Smoking status: Never     Smokeless tobacco: Never   Vaping Use     Vaping Use: Never used   Substance and Sexual Activity     Alcohol use: Yes     Comment: 12 pack per week     Drug use: Not Currently     Comment: used in the past for 15 yrs     Sexual activity: Not Currently   Other Topics Concern     Parent/sibling w/ CABG, MI or angioplasty before 65F 55M? No   Social History Narrative    August 28, 2018    ENVIRONMENTAL HISTORY: [place] The home is heated with a forced air. They do have central air conditioning. The patient's bedroom is furnished with carpeting in bedroom and fabric window coverings.  Pets inside the house include 2 cats, 1 dog and 1 piglet. There is not history of cockroach or mice infestation. There is 1 smoker in the house, only smokes outside.  The house does not have a damp basement.        Family History  Family History   Problem Relation Age of Onset     Diabetes Mother      Asthma Mother      Depression Brother      Myocardial Infarction Brother      Hypertension Cousin        Review of Systems  As per HPI and PMHx, otherwise 10 system review including the head and neck, constitutional, eyes, respiratory, GI, skin, neurologic, lymphatic, endocrine, and allergy  systems is negative.    Physical Exam  BP (!) 144/93   Pulse 98   Temp 98.3  F (36.8  C) (Tympanic)   SpO2 99%   GENERAL: Patient is a pleasant, cooperative 52 year old male in no acute distress.  HEAD: Normocephalic, atraumatic.  Hair and scalp are normal.  EYES: Pupils are equal, round, reactive to light and accommodation.  Extraocular movements are intact.  The sclera nonicteric without injection.  The extraocular structures are normal.  EARS: Normal shape and symmetry.  No tenderness when palpating the mastoid or tragal areas bilaterally.    NOSE: Nares are patent.  Nasal mucosa is pink and moist.  Negative anterior rhinoscopy.  ORAL CAVITY: Dentition is in mixed repair.  Mucous membranes are moist.  Tongue is mobile, protrudes to the midline.  Palate elevates symmetrically.  Tonsils are 1.5+, symmetric.  No erythema or exudate.  No oral cavity or oropharyngeal masses, lesions, ulcerations, leukoplakia.  NECK: Supple, trachea is midline.  There no palpable cervical lymphadenopathy or masses bilaterally.    NEUROLOGIC: Cranial nerves II through XII are grossly intact.  Voice is strong.  Patient is House-Brackmann I/VI bilaterally.  CARDIOVASCULAR: Extremities are warm and well-perfused.  No significant peripheral edema.  RESPIRATORY: Patient has nonlabored breathing without cough, wheeze, stridor.  PSYCHIATRIC: Patient is alert and oriented.  Mood and affect appear normal.  SKIN: Warm and dry.  No scalp, face, or neck lesions noted.    Assessment and Plan     ICD-10-CM    1. Epiglottitis  J05.10 Adult ENT  Referral         It was my pleasure seeing Ilya Villagran today in clinic.  Patient has had significant clinical improvement.  We deferred scope exam due to his significant gag reflex and the fact that he is doing much better.  He does get strep throat occasionally, every few years he will have an episode.  He does still have his tonsils, which are not enlarged on examination.  We briefly discussed  tonsillectomy but I think given his history, I would recommend observation.  The patient can return to clinic as needed in the future with any problems or concerns.    Ilya to follow up with Primary Care provider regarding elevated blood pressure.    Hiro Haynes MD  Department of Otolaryngology-Head and Neck Surgery  [facility]         Again, thank you for allowing me to participate in the care of your patient.        Sincerely,        Hiro Haynes MD

## 2022-12-09 NOTE — NURSING NOTE
"Initial BP (!) 144/93   Pulse 98   Temp 98.3  F (36.8  C) (Tympanic)   SpO2 99%  Estimated body mass index is 28.76 kg/m  as calculated from the following:    Height as of 11/29/22: 1.854 m (6' 1\").    Weight as of 11/29/22: 98.9 kg (218 lb). .    Alisha Degroot LPN on 12/9/2022 at 2:39 PM    "

## 2022-12-09 NOTE — PROGRESS NOTES
Chief Complaint   Patient presents with     Consult     History of Present Illness  Ilya Villagran is a 52 year old male who presents today for follow-up.  I evaluated the patient on 11/21/2022 with epiglottitis.  He required transfer to [facility] due to bed availability and was observed there in the ICU.  He eventually discharged on 11/24/2022.  He was seen again in the ER on 11/29/2022 to receive IV fluids for some hypotension after his recent illness.  They wanted to repeat of blood sodium but he left AMA.  He returns today for follow-up.    From a symptom standpoint, the patient reports significant interval improvement in his symptoms.  He was having some significant blood sugar elevations on the steroids which have resolved.  He is taking metformin and his blood sugars have been much better.  He denies any swallowing difficulty.  He does report some mild hoarseness to his voice but overall improved.  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 coma (H)     Current Medications    Current Outpatient Medications:      acetaminophen (TYLENOL) 500 MG tablet, Take 1-2 tablets (500-1,000 mg) by mouth every 6 hours as needed for mild pain, Disp: , Rfl:      albuterol (PROAIR HFA/PROVENTIL HFA/VENTOLIN HFA) 108 (90 Base) MCG/ACT inhaler, Inhale 2 puffs into the lungs every 4 hours as needed for wheezing, Disp: 18 g, Rfl:      blood glucose (NO BRAND SPECIFIED) lancets standard, by In Vitro route 4 times daily, Disp: 100 each, Rfl: 11     blood glucose (NO BRAND  SPECIFIED) test strip, 1 strip by In Vitro route 4 times daily, Disp: 100 strip, Rfl: 3     blood glucose monitoring (NO BRAND SPECIFIED) meter device kit, Use to test blood sugar 1 times daily or as directed., Disp: 1 kit, Rfl: 0     insulin lispro (HUMALOG KWIKPEN) 100 UNIT/ML (1 unit dial) KWIKPEN, Inject 1-10 Units Subcutaneous 3 times daily (before meals) To be given with prandial insulin, and based on pre-meal blood glucose. Do Not give Correction Insulin if Pre-Meal BG less than 140. For Pre-Meal  - 164 give 1 unit. For Pre-Meal  - 189 give 2 units. For Pre-Meal  - 214 give 3 units. For Pre-Meal  - 239 give 4 units. For Pre-Meal  - 264 give 5 units. For Pre-Meal  - 289 give 6 units. For Pre-Meal  - 314 give 7 units. For Pre-Meal  - 339 give 8 units. For Pre-Meal  - 364 give 9 units.  For Pre-Meal BG greater than or equal to 365 give 10 units, Disp: 15 mL, Rfl: 2     insulin lispro (HUMALOG KWIKPEN) 100 UNIT/ML (1 unit dial) KWIKPEN, Inject 1-7 Units Subcutaneous At Bedtime HIGH INSULIN RESISTANCE DOSING   Do Not give Bedtime Correction Insulin if BG less than 200.  For  - 224 give 1 units.  For  - 249 give 2 units.  For  - 274 give 3 units.  For  - 299 give 4 units.  For  - 324 give 5 units.  For  - 349 give 6 units.  For BG greater than or equal to 350 give 7 units., Disp: , Rfl:      insulin pen needle (30G X 8 MM) 30G X 8 MM miscellaneous, Use 4 pen needles daily or as directed., Disp: 100 each, Rfl: 0     losartan (COZAAR) 100 MG tablet, Take 1 tablet (100 mg) by mouth daily, Disp: 90 tablet, Rfl: 3     metFORMIN (GLUCOPHAGE) 1000 MG tablet, Take 1 tablet (1,000 mg) by mouth 2 times daily (with meals), Disp: 30 tablet, Rfl: 0     sertraline (ZOLOFT) 50 MG tablet, Take 1 tablet (50 mg) by mouth daily, Disp: 90 tablet, Rfl: 3     metFORMIN (GLUCOPHAGE) 1000 MG tablet, Take 1 tablet (1,000 mg) by mouth daily (with  dinner) for 1 dose, Disp: 1 tablet, Rfl: 0    Allergies  Allergies   Allergen Reactions     Ace Inhibitors Cough       Social History  Social History     Socioeconomic History     Marital status: Single     Spouse name: None     Number of children: None     Years of education: None     Highest education level: None   Occupational History     Occupation: /customer service     Employer: [employer]   Tobacco Use     Smoking status: Never     Smokeless tobacco: Never   Vaping Use     Vaping Use: Never used   Substance and Sexual Activity     Alcohol use: Yes     Comment: 12 pack per week     Drug use: Not Currently     Comment: used in the past for 15 yrs     Sexual activity: Not Currently   Other Topics Concern     Parent/sibling w/ CABG, MI or angioplasty before 65F 55M? No   Social History Narrative    August 28, 2017    ENVIRONMENTAL HISTORY: [place] The home is heated with a forced air. They do have central air conditioning. The patient's bedroom is furnished with carpeting in bedroom and fabric window coverings.  Pets inside the house include 2 cats, 1 dog and 1 piglet. There is not history of cockroach or mice infestation. There is 1 smoker in the house, only smokes outside.  The house does not have a damp basement.        Family History  Family History   Problem Relation Age of Onset     Diabetes Mother      Asthma Mother      Depression Brother      Myocardial Infarction Brother      Hypertension Cousin        Review of Systems  As per HPI and PMHx, otherwise 10 system review including the head and neck, constitutional, eyes, respiratory, GI, skin, neurologic, lymphatic, endocrine, and allergy systems is negative.    Physical Exam  BP (!) 144/93   Pulse 98   Temp 98.3  F (36.8  C) (Tympanic)   SpO2 99%   GENERAL: Patient is a pleasant, cooperative 52 year old male in no acute distress.  HEAD: Normocephalic, atraumatic.  Hair and scalp are  normal.  EYES: Pupils are equal, round, reactive to light and accommodation.  Extraocular movements are intact.  The sclera nonicteric without injection.  The extraocular structures are normal.  EARS: Normal shape and symmetry.  No tenderness when palpating the mastoid or tragal areas bilaterally.    NOSE: Nares are patent.  Nasal mucosa is pink and moist.  Negative anterior rhinoscopy.  ORAL CAVITY: Dentition is in mixed repair.  Mucous membranes are moist.  Tongue is mobile, protrudes to the midline.  Palate elevates symmetrically.  Tonsils are 1.5+, symmetric.  No erythema or exudate.  No oral cavity or oropharyngeal masses, lesions, ulcerations, leukoplakia.  NECK: Supple, trachea is midline.  There no palpable cervical lymphadenopathy or masses bilaterally.    NEUROLOGIC: Cranial nerves II through XII are grossly intact.  Voice is strong.  Patient is House-Brackmann I/VI bilaterally.  CARDIOVASCULAR: Extremities are warm and well-perfused.  No significant peripheral edema.  RESPIRATORY: Patient has nonlabored breathing without cough, wheeze, stridor.  PSYCHIATRIC: Patient is alert and oriented.  Mood and affect appear normal.  SKIN: Warm and dry.  No scalp, face, or neck lesions noted.    Assessment and Plan     ICD-10-CM    1. Epiglottitis  J05.10 Adult ENT  Referral         It was my pleasure seeing Ilya Villagran today in clinic.  Patient has had significant clinical improvement.  We deferred scope exam due to his significant gag reflex and the fact that he is doing much better.  He does get strep throat occasionally, every few years he will have an episode.  He does still have his tonsils, which are not enlarged on examination.  We briefly discussed tonsillectomy but I think given his history, I would recommend observation.  The patient can return to clinic as needed in the future with any problems or concerns.    Ilya to follow up with Primary Care provider regarding elevated blood  pressure.    Hiro Haynes MD  Department of Otolaryngology-Head and Neck Surgery  [facility]

## 2022-12-13 ENCOUNTER — TELEPHONE (OUTPATIENT)
Dept: FAMILY MEDICINE | Facility: CLINIC | Age: 52
End: 2022-12-13

## 2022-12-13 NOTE — TELEPHONE ENCOUNTER
Panel Management:    Patient is due for a diabetes check up.  Multiple recent ER/Hosp visits due to high blood sugar     Please notify patient to schedule an appointment.  Please remind them to bring meter to visit so I can review blood sugar data.

## 2022-12-22 NOTE — PROGRESS NOTES
"Ilya Villagran  Gender: male  : 1970  [address] [address] [phone] (home) [phone] (work)    Medical Record: [id number]  Pharmacy:    [facility] - [place], [place] - [address] [facility] [place] [address] [place] [address]  Primary Care Provider: Carin Pepe    Parent's names are: Data Unavailable (mother) and Data Unavailable (father).      [facility]  2022     Discharge Phone Call:  Key Words/Key Times      Introduction - AIDET (Acknowledge, Introduce, Duration, Explanation)      Empathy-   We are calling to see how you are since your recent stay in the hospital?     Call back COMMENTS: \"Very good!\"      Clinical Questions -  (f/u appts, medication side effects/purpose, ability to care for self at home) \"For your safety, it is important to us that you understand the purpose and side effects of your medications, can you tell me what your new medications are?\"     Call back COMMENTS: Had a follow up appointment. No questions about his meds or how to care for himself.       Staff Recognition -  We like to recognize staff and physicians who have done an excellent job.  Do you remember any people from your care team that you would like recognize?     Call back COMMENTS: \"Jefferson was super friendly.\" \"Everybody was just a peach at [facility]---much better than the staff at [place]. I think they were overwhelmed.\"      Very Good Care -  We want to provide very good care to all patients.  How was your care?     Call back COMMENTS: \"Great!\"      Opportunities for Improvement -  Our goal is to be the best.  Do you have any suggestions for things that we could improve upon?     Call back COMMENTS: No suggestions. Stated he would come back here if he ever needs to be hospitalized even though he lives closer to [place].       Thank You "

## 2023-01-17 ENCOUNTER — TELEPHONE (OUTPATIENT)
Dept: FAMILY MEDICINE | Facility: CLINIC | Age: 53
End: 2023-01-17
Payer: COMMERCIAL

## 2023-01-17 NOTE — LETTER
January 17, 2023      Ilya LATHA Villagran  [address] [facility] [address]    Your team at [facility] cares about your health. We have reviewed your chart and based on our findings; we are making the following recommendations to better manage your health.     You are in particular need of attention regarding the following:     Schedule a DIABETIC FOLLOW UP appointment for Office Visit. Please bring your meter in at this visit so Dr. Pepe can review the recent data.    Call or MyChart message your clinic to schedule a colonoscopy, schedule/ a FIT Test, or order a Cologuard test. If you are unsure what type of test you need, please call your clinic and speak to clinic staff.   Colon cancer is now the second leading cause of cancer-related deaths in the [place] for both men and women and there are over 130,000 new cases and 50,000 deaths per year from colon cancer. Colonoscopies can prevent 90-95% of these deaths. Problem lesions can be removed before they ever become cancer. This test is not only looking for cancer, but also getting rid of precancerous lesions.     If you have already completed these items, please contact the clinic via phone or   Periscopehart so your care team can review and update your records. Thank you for   choosing [facility]ics for your healthcare needs. For any questions,   concerns, or to schedule an appointment please contact our clinic at [phone].    Healthy Regards,      Your [facility] Care Team

## 2023-03-02 DIAGNOSIS — E11.65 UNCONTROLLED TYPE 2 DIABETES MELLITUS WITH HYPERGLYCEMIA (H): ICD-10-CM

## 2023-03-06 NOTE — TELEPHONE ENCOUNTER
Dr. Pepe or covering provider,    Looks like a rob refill was given already.  Please advise. Agatha ODONNELL RN

## 2023-03-14 ENCOUNTER — TELEPHONE (OUTPATIENT)
Dept: FAMILY MEDICINE | Facility: CLINIC | Age: 53
End: 2023-03-14
Payer: COMMERCIAL

## 2023-03-14 DIAGNOSIS — E11.9 TYPE 2 DIABETES MELLITUS WITHOUT COMPLICATION, WITHOUT LONG-TERM CURRENT USE OF INSULIN (H): Primary | ICD-10-CM

## 2023-03-14 NOTE — LETTER
March 28, 2023      Ilya Villagran  [address] [facility] [address]        Dear Ilya,     Your team at [facility] cares about your health. We have reviewed your chart and based on our findings; we are making the following recommendations to better manage your health.     You are in particular need of attention regarding the following:     Schedule a DIABETIC FOLLOW UP appointment for Office Visit. Patients with diabetes should see their provider regularly.      Patient is due for a diabetes check up.  They are due for fasting blood work.        Orders were placed, please have lab work done a day or so before visit with me.  Please notify patient to schedule an appointment, I typically book out about 1 month.  Please remind them to bring meter to visit so I can review blood sugar data.    If you have already completed these items, please contact the clinic via phone or   N42hart so your care team can review and update your records. Thank you for   choosing [facility]ics for your healthcare needs. For any questions,   concerns, or to schedule an appointment please contact our clinic.            Sincerely,        Carin Pepe, DO

## 2023-03-14 NOTE — TELEPHONE ENCOUNTER
Panel Management:    Patient is due for a diabetes check up.  They are due for fasting blood work.      Orders were placed, please have lab work done a day or so before visit with me.  Please notify patient to schedule an appointment, I typically book out about 1 month.  Please remind them to bring meter to visit so I can review blood sugar data.

## 2023-03-21 NOTE — TELEPHONE ENCOUNTER
Patient Quality Outreach    Patient is due for the following:   Diabetes -  Diabetic Follow-Up Visit with fasting lab work    Next Steps:   Schedule a lab only visit for fasting lab work office visit for diabetes    Type of outreach:    Phone, left message for patient/parent to call back.      Questions for provider review:    None     Shahida Jordan, [facility]

## 2023-03-28 NOTE — TELEPHONE ENCOUNTER
Patient Quality Outreach    Patient is due for the following:   Diabetes -  Diabetic Follow-Up Visit and fasting labs     Next Steps:   Schedule a office visit for Diabetes with labs prior     Type of outreach:    Sent letter.      Questions for provider review:    None     Timmy Barron, CMA

## 2023-04-20 ENCOUNTER — PATIENT OUTREACH (OUTPATIENT)
Dept: CARE COORDINATION | Facility: CLINIC | Age: 53
End: 2023-04-20
Payer: COMMERCIAL

## 2023-06-13 ENCOUNTER — TELEPHONE (OUTPATIENT)
Dept: FAMILY MEDICINE | Facility: CLINIC | Age: 53
End: 2023-06-13
Payer: COMMERCIAL

## 2023-06-13 NOTE — LETTER
June 19, 2023      Ilya Villagran  [address] [facility] [address]        Dear Ilya,       Your team at [facility] cares about your health. We have reviewed your chart and based on our findings; we are making the following recommendations to better manage your health.     You are in particular need of attention regarding the following:     Schedule a DIABETIC FOLLOW UP appointment for Office Visit. Patients with diabetes should see their provider regularly.  Panel Management:     Patient is due for a diabetes check up.  They are due for fasting blood work.  Lipid panel, A1C and Metabolic Panel     Orders were placed, please have lab work done a day or so before visit with me.  Please notify patient to schedule an appointment, I typically book out about 1 month.  Please remind them to bring meter to visit so I can review blood sugar data. Please call [phone] to schedule .     If you have already completed these items, please contact the clinic via phone or   MyChart so your care team can review and update your records. Thank you for   choosing [facility] for your healthcare needs. For any questions,   concerns, or to schedule an appointment please contact our clinic.      Sincerely,        Carin Pepe, DO

## 2023-06-13 NOTE — TELEPHONE ENCOUNTER
Panel Management:    Patient is due for a diabetes check up.  They are due for fasting blood work.  Lipid panel, A1C and Metabolic Panel    Orders were placed, please have lab work done a day or so before visit with me.  Please notify patient to schedule an appointment, I typically book out about 1 month.  Please remind them to bring meter to visit so I can review blood sugar data.

## 2023-06-13 NOTE — TELEPHONE ENCOUNTER
Patient Quality Outreach    Patient is due for the following:   Diabetes -  A1C, LDL (Fasting) and Diabetic Follow-Up Visit,bmp    Next Steps:   Schedule a office visit for Diabetes and Lab appointment a few days prior     Type of outreach:    Phone, left message for patient/parent to call back.      Questions for provider review:    None           Timmy Barron, CMA

## 2023-06-19 NOTE — TELEPHONE ENCOUNTER
Patient Quality Outreach    Patient is due for the following:   Diabetes -  Diabetic Follow-Up Visit and labs prior     Next Steps:   Schedule a office visit for diabetes and labs    Type of outreach:    Sent letter.      Questions for provider review:    None           Timmy Barron, CMA

## 2023-06-22 ENCOUNTER — TELEPHONE (OUTPATIENT)
Dept: PHARMACY | Facility: OTHER | Age: 53
End: 2023-06-22
Payer: COMMERCIAL

## 2023-07-28 ENCOUNTER — TELEPHONE (OUTPATIENT)
Dept: PHARMACY | Facility: OTHER | Age: 53
End: 2023-07-28
Payer: COMMERCIAL

## 2023-07-28 NOTE — TELEPHONE ENCOUNTER
MTM Recruitment: [facility] insurance     Referral outreach attempt #2 on July 28, 2023      Outcome: left voicemail and sent letter.    Charla Brown, PharmD  Medication Therapy Management Pharmacist   [facility]

## 2023-07-28 NOTE — LETTER
July 28, 2023      Ilya Villagran  [address] [facility] [address]        Jose Caraballo,     My name is Charla, I'm the pharmacist who recently left you a voicemail.      I'm reaching out because [facility] works with your insurance to provide a program called Medication Therapy Management (MTM).      During an MTM visit, a specially trained pharmacist will review all of your medicines, both prescription and over-the-counter. We will make sure your medicines are the best choice for you and are safe and convenient for you.     MTM pharmacists work together with you and your provider to help you understand your medicines, solve any problems related to your medicines and help you get the best results from taking your medicines. MTM visits can be completed by phone, video or in-person and are provided at no cost to you.     To schedule an appointment, please feel free to reply to this message or call [phone] and our pharmacy office will get you set up (no phone tree). Thanks for considering and I hope to speak with you soon!     Charla Brown, PharmD  Medication Therapy Management Pharmacist   [facility]

## 2023-08-17 DIAGNOSIS — F41.9 ANXIETY AND DEPRESSION: ICD-10-CM

## 2023-08-17 DIAGNOSIS — F32.A ANXIETY AND DEPRESSION: ICD-10-CM

## 2023-09-05 ENCOUNTER — TELEPHONE (OUTPATIENT)
Dept: FAMILY MEDICINE | Facility: CLINIC | Age: 53
End: 2023-09-05
Payer: COMMERCIAL

## 2023-09-05 DIAGNOSIS — E11.9 TYPE 2 DIABETES MELLITUS WITHOUT COMPLICATION, WITHOUT LONG-TERM CURRENT USE OF INSULIN (H): Primary | ICD-10-CM

## 2023-09-05 NOTE — LETTER
September 12, 2023      Ilya Villagran  [address] [address] [address]        Dear Ilya,       Your team at [facility] cares about your health. We have reviewed your chart and based on our findings; we are making the following recommendations to better manage your health.     You are in particular need of attention regarding the following:     Schedule a DIABETIC FOLLOW UP appointment for Office Visit. Patients with diabetes should see their provider regularly.     Patient is due for a diabetes check up.  They are due for fasting blood work.  Lipid panel, A1C, Metabolic Panel, and Microalbumin     Orders were placed, please have lab work done a day or so before visit with me.  Please remind them to bring meter to visit so I can review blood sugar data.     Please call [phone] to schedule lab and office visit .     If you have already completed these items, please contact the clinic via phone or   [facility] so your care team can review and update your records. Thank you for   choosing [facility]ics for your healthcare needs. For any questions,   concerns, or to schedule an appointment please contact our clinic.      Sincerely,        Carin Pepe, DO

## 2023-09-05 NOTE — CONFIDENTIAL NOTE
Panel Management:    Patient is due for a diabetes check up.  They are due for fasting blood work.  Lipid panel, A1C, Metabolic Panel, and Microalbumin    Orders were placed, please have lab work done a day or so before visit with me.  Please remind them to bring meter to visit so I can review blood sugar data.

## 2023-09-06 NOTE — TELEPHONE ENCOUNTER
Patient Quality Outreach    Patient is due for the following:   Diabetes -  Diabetic Follow-Up Visit  Patient is due for a diabetes check up.  They are due for fasting blood work.  Lipid panel, A1C, Metabolic Panel, and Microalbumin     Orders were placed, please have lab work done a day or so before visit with me.  Please remind them to bring meter to visit so I can review blood sugar data.  Next Steps:   Schedule a office visit for labs first then office visit for Diabetes    Type of outreach:    Phone, left message for patient/parent to call back.      Questions for provider review:    None           Timmy Barron, CMA

## 2023-09-12 NOTE — TELEPHONE ENCOUNTER
Patient Quality Outreach    Patient is due for the following:   Diabetes -  Diabetic Follow-Up Visit  Panel Management:     Patient is due for a diabetes check up.  They are due for fasting blood work.  Lipid panel, A1C, Metabolic Panel, and Microalbumin     Orders were placed, please have lab work done a day or so before visit with me.  Please remind them to bring meter to visit so I can review blood sugar data.     Next Steps:   Schedule a office visit for diabetes with lab prior     Type of outreach:    Sent letter.      Questions for provider review:    None           Timmy Barron, CMA

## 2023-10-03 NOTE — TELEPHONE ENCOUNTER
MTM Outreach Attempt #3  Referred to MTM by insurance plan  Outcome: left message    Zofia Yi, PharmD  Medication Therapy Management

## 2023-10-31 ENCOUNTER — TELEPHONE (OUTPATIENT)
Dept: FAMILY MEDICINE | Facility: CLINIC | Age: 53
End: 2023-10-31
Payer: COMMERCIAL

## 2023-10-31 DIAGNOSIS — E11.9 TYPE 2 DIABETES MELLITUS WITHOUT COMPLICATION, WITHOUT LONG-TERM CURRENT USE OF INSULIN (H): Primary | ICD-10-CM

## 2023-10-31 NOTE — LETTER
November 9, 2023      Ilya Villagran  [address] [address] [facility] [address]        Dear Ilya,       Your team at [facility] cares about your health. We have reviewed your chart and based on our findings; we are making the following recommendations to better manage your health.     You are in particular need of attention regarding the following:     Schedule a DIABETIC FOLLOW UP appointment for Office Visit. Patients with diabetes should see their provider regularly.       Patient is due for a diabetes check up.  They are due for fasting blood work.  Lipid panel, A1C, Metabolic Panel, and Microalbumin     Orders were placed, please have lab work done a day or so before visit with me.  Please remind them to bring meter to visit so I can review blood sugar data.   Please Call [phone] to schedule.     If you have already completed these items, please contact the clinic via phone or   [facility] so your care team can review and update your records. Thank you for   choosing [facility] for your healthcare needs. For any questions,   concerns, or to schedule an appointment please contact our clinic.          Sincerely,        Carin Pepe, DO

## 2023-11-02 NOTE — TELEPHONE ENCOUNTER
Patient Quality Outreach    Patient is due for the following:   Diabetes -  Diabetic Follow-Up Visit  Panel Management:     Patient is due for a diabetes check up.  They are due for fasting blood work.  Lipid panel, A1C, Metabolic Panel, and Microalbumin     Orders were placed, please have lab work done a day or so before visit with me.  Please remind them to bring meter to visit so I can review blood sugar data.        Next Steps:   Schedule a office visit for Diabetes with labs prior     Type of outreach:    Phone, left message for patient/parent to call back.      Questions for provider review:    None           Timmy Barron, CMA

## 2023-11-04 NOTE — TELEPHONE ENCOUNTER
Patient Quality Outreach Summary      Summary:    Patient is due/failing the following:   Diabetic Follow-Up Visit, ACT needed, Hypertension follow-up visit, FIT and Colonoscopy and PHQ-9 Needed   Can update questionnaires and discuss colon cancer screening at clinic appointment.    Type of outreach:    Phone, left message for patient/parent to call back.    Questions for provider review:    None                                                                                                                    RONAN Jordan MA       Chart routed to Care Team.    
(3rd attempt) Left a voice msg for pt to call back.  Whenever he calls back schedule labs and follow up with PCP for diabetes follow up -- as long the labs are done the follow up with PCP can be virtual/face to face/ phone.  Pt also needs ACT and PHQ9/GAD7 - forms and letter about diabetes sent today.  Ciara Villalba CMA (JOSE)   (aka: Ange Villalba)    
Left message for patient to return call.    
Satisfactory

## 2024-02-02 ENCOUNTER — NURSE TRIAGE (OUTPATIENT)
Dept: FAMILY MEDICINE | Facility: CLINIC | Age: 54
End: 2024-02-02

## 2024-02-02 ENCOUNTER — ALLIED HEALTH/NURSE VISIT (OUTPATIENT)
Dept: FAMILY MEDICINE | Facility: CLINIC | Age: 54
End: 2024-02-02
Payer: COMMERCIAL

## 2024-02-02 ENCOUNTER — TELEPHONE (OUTPATIENT)
Dept: FAMILY MEDICINE | Facility: CLINIC | Age: 54
End: 2024-02-02

## 2024-02-02 VITALS — HEART RATE: 94 BPM | SYSTOLIC BLOOD PRESSURE: 158 MMHG | DIASTOLIC BLOOD PRESSURE: 104 MMHG

## 2024-02-02 DIAGNOSIS — F41.9 ANXIETY AND DEPRESSION: ICD-10-CM

## 2024-02-02 DIAGNOSIS — F32.A ANXIETY AND DEPRESSION: ICD-10-CM

## 2024-02-02 DIAGNOSIS — I10 ESSENTIAL HYPERTENSION: Primary | ICD-10-CM

## 2024-02-02 DIAGNOSIS — I10 ESSENTIAL HYPERTENSION: ICD-10-CM

## 2024-02-02 PROCEDURE — 99207 PR NO CHARGE NURSE ONLY: CPT

## 2024-02-02 RX ORDER — LOSARTAN POTASSIUM 100 MG/1
100 TABLET ORAL DAILY
Qty: 90 TABLET | Refills: 3 | Status: CANCELLED | OUTPATIENT
Start: 2024-02-02

## 2024-02-02 ASSESSMENT — ANXIETY QUESTIONNAIRES
IF YOU CHECKED OFF ANY PROBLEMS ON THIS QUESTIONNAIRE, HOW DIFFICULT HAVE THESE PROBLEMS MADE IT FOR YOU TO DO YOUR WORK, TAKE CARE OF THINGS AT HOME, OR GET ALONG WITH OTHER PEOPLE: SOMEWHAT DIFFICULT
GAD7 TOTAL SCORE: 2
2. NOT BEING ABLE TO STOP OR CONTROL WORRYING: NOT AT ALL
6. BECOMING EASILY ANNOYED OR IRRITABLE: SEVERAL DAYS
GAD7 TOTAL SCORE: 2
3. WORRYING TOO MUCH ABOUT DIFFERENT THINGS: NOT AT ALL
7. FEELING AFRAID AS IF SOMETHING AWFUL MIGHT HAPPEN: SEVERAL DAYS
5. BEING SO RESTLESS THAT IT IS HARD TO SIT STILL: NOT AT ALL
1. FEELING NERVOUS, ANXIOUS, OR ON EDGE: NOT AT ALL

## 2024-02-02 ASSESSMENT — PATIENT HEALTH QUESTIONNAIRE - PHQ9
5. POOR APPETITE OR OVEREATING: NOT AT ALL
SUM OF ALL RESPONSES TO PHQ QUESTIONS 1-9: 6

## 2024-02-02 NOTE — TELEPHONE ENCOUNTER
Dr. Miller:    Would you please review this nurse triage and sign these medications if appropriate?      LAYTON Giron     The scribe's documentation has been prepared under my direction and personally reviewed by me in its entirety. I confirm that the note above accurately reflects all work, treatment, procedures, and medical decision making performed by me.  Soy Don MD

## 2024-02-02 NOTE — TELEPHONE ENCOUNTER
Pt is calling he needs BP meds restarted. He was at the chiropractor today and she would not finish his adjustment because of elevated BP in the 180's. pt reports he had some dizziness and has had some neck pain for months. I made him an appt for 2/7. He denies any red flag symptoms currently. Given warning signs to be seen urgently.  Pended meds. He would like a call when meds are ordered.  Henny Mendez RN on 2/2/2024 at 2:24 PM   Reason for Disposition   Systolic BP >= 180 OR Diastolic >= 110, and missed most recent dose of blood pressure medication    Additional Information   Negative: Sounds like a life-threatening emergency to the triager   Negative: Symptom is main concern (e.g., headache, chest pain)   Negative: Low blood pressure is main concern    Protocols used: Blood Pressure - High-A-OH

## 2024-02-02 NOTE — TELEPHONE ENCOUNTER
"Patient called, he says there were no more refills on his BP medication at pharmacy and he just did not get around to scheduling so has been out of them for \"a while\". Patient says he does not have a home BP cuff, he is right across the street from [facility] and can stop in there now to recheck it. Patient is read Sarah Barriga's note below, patient is asking for refills till he can be seen.    Patient is told please stop over and walk in to clinic at [facility], will verify readings, he says chiropractor checked BP twice and was 170/92, 180/100. Patient is told if his readings are that high again he would need to present to the ER at [place]. Patient declines this, saying just wants medication to help.      RN's at [facility] messaged to update patient is stopping over shortly.      Will keep encounter open to ensure patient has complied with advise.          LAYTON Giron    "

## 2024-02-02 NOTE — TELEPHONE ENCOUNTER
Ilya Villagran is a 53 year old year old patient who comes in today for a Blood Pressure check because of ran out of BP meds at least a couple of months ago. Ilya went to the chiropractor today for some neck pain. BP there was 170/92 laying down, 180/100 sitting.  Vital Signs as repeated by /102 P 94, 158/104  Patient is not taking medication as prescribed, ran out a couple of months ago  Patient states he would like to get back on Blood pressure meds and sertraline  Patient is not monitoring Blood Pressure at home.    Current complaints: neck pain which he just restarted chiropractic care for today. Has some dizziness when he looks upward-even when on medication. He gets occasional headaches which subsides with  Ibuprofen. Denies chest pain.   Disposition:  routing to [facility] care team to have covering provider address. Patient scheduled a clinic appt for 02/06/24.   He is asking for a short supply of meds until he can be seen.   Updated mental health screenings today as well.      4/29/2022     2:30 PM 11/29/2022    10:50 AM 2/2/2024     3:51 PM   PHQ   PHQ-9 Total Score 2 8 6   Q9: Thoughts of better off dead/self-harm past 2 weeks Not at all Not at all Not at all   Patient states he has not plan or intent to commit suicide.      5/8/2020    10:14 AM 11/30/2020    12:14 PM 2/2/2024     3:51 PM   KIKE-7 SCORE   Total Score 2 5 2     Vivian MATTHEW RN

## 2024-02-02 NOTE — TELEPHONE ENCOUNTER
Notified Ilya of  recommendation to be seen in urgent care per Dr Miller. He declines and will keep his follow up appt with PCP for 02/06/24.  Vivian MATTHEW RN

## 2024-02-02 NOTE — TELEPHONE ENCOUNTER
BP Readings from Last 6 Encounters:   12/09/22 (!) 144/93   11/29/22 131/85   11/29/22 (!) 82/62   11/24/22 (!) 140/86   11/21/22 120/78   06/03/22 (!) 134/92       Covering for Dr. Pepe:  Patient has not been seen in over a year.  It is difficult to assess what his blood pressure actually is and more information is needed.  Was this just a one-time elevation at the chiropractor's office?  Did they recheck it?  Does he have a blood pressure machine at home where he can check his blood pressure at home?  How long has he been off his medications?  What was the reason he stopped his medications?    Ideally patient should come into clinic today for an RN blood pressure appointment to verify his blood pressure.  If his blood pressures are truly 180 or higher systolically and he is having dizziness or headache or chest pain, then he needs to go to the ER.  If his blood pressures are less than 180 and he is asymptomatic then he can wait for his clinic appointment.  Sarah Barriga, CNP

## 2024-02-02 NOTE — PROGRESS NOTES
Ilya Villagran is a 53 year old year old patient who comes in today for a Blood Pressure check because of ran out of BP meds at least a couple of months ago. Ilya went to the chiropractor today for some neck pain. BP there was 170/92 laying down, 180/100 sitting.  Vital Signs as repeated by /102 P 94, 158/104  Patient is not taking medication as prescribed, ran out a couple of months ago  Patient states he would like to get back on Blood pressure meds and sertraline  Patient is not monitoring Blood Pressure at home.    Current complaints: neck pain which he just restarted chiropractic care for today. Has some dizziness when he looks upward-even when on medication. He gets occasional headaches which subsides with  Ibuprofen. Denies chest pain.   Disposition:  routing to [facility] team to have covering provider address. Patient scheduled a clinic appt for 02/06/24.   He is asking for a short supply of meds until he can be seen.   Updated mental health screenings today as well.      4/29/2022     2:30 PM 11/29/2022    10:50 AM 2/2/2024     3:51 PM   PHQ   PHQ-9 Total Score 2 8 6   Q9: Thoughts of better off dead/self-harm past 2 weeks Not at all Not at all Not at all   Patient states he has not plan or intent to commit suicide.      5/8/2020    10:14 AM 11/30/2020    12:14 PM 2/2/2024     3:51 PM   KIKE-7 SCORE   Total Score 2 5 2   Encounter sent by Misti TRAYLOR to Dr Miller to review in PCPs absence.   Vivian MATTHEW RN

## 2024-02-02 NOTE — TELEPHONE ENCOUNTER
Spoke to Dr. Miller, see note below, needs to be seen, messaged RN Vivian in [place] there with patient now to advise Urgent Care visit now, BP is too high.      LAYTON Giron

## 2024-02-02 NOTE — TELEPHONE ENCOUNTER
Symptoms    Describe your symptoms: reports bp 180/100 sitting up  Reports dizziness--he sees stars and ha.     His chiropractor did not want to do an adjustment as bp was too high.     [place] [id number] [place] [address]  Phone: [phone] Fax: [phone]        Okay to leave a detailed message?: Yes at Cell number on file:    Telephone Information:   Mobile [phone]

## 2024-02-06 ENCOUNTER — OFFICE VISIT (OUTPATIENT)
Dept: FAMILY MEDICINE | Facility: CLINIC | Age: 54
End: 2024-02-06
Payer: COMMERCIAL

## 2024-02-06 VITALS
HEIGHT: 72 IN | HEART RATE: 89 BPM | OXYGEN SATURATION: 96 % | DIASTOLIC BLOOD PRESSURE: 106 MMHG | SYSTOLIC BLOOD PRESSURE: 188 MMHG | BODY MASS INDEX: 29.57 KG/M2 | TEMPERATURE: 98.8 F | RESPIRATION RATE: 16 BRPM

## 2024-02-06 DIAGNOSIS — Z12.11 SCREEN FOR COLON CANCER: ICD-10-CM

## 2024-02-06 DIAGNOSIS — F41.9 ANXIETY AND DEPRESSION: ICD-10-CM

## 2024-02-06 DIAGNOSIS — F32.A ANXIETY AND DEPRESSION: ICD-10-CM

## 2024-02-06 DIAGNOSIS — I10 ESSENTIAL HYPERTENSION: Primary | ICD-10-CM

## 2024-02-06 DIAGNOSIS — E11.9 TYPE 2 DIABETES MELLITUS WITHOUT COMPLICATION, WITHOUT LONG-TERM CURRENT USE OF INSULIN (H): ICD-10-CM

## 2024-02-06 PROBLEM — R73.9 HYPERGLYCEMIA: Status: RESOLVED | Noted: 2017-07-20 | Resolved: 2024-02-06

## 2024-02-06 PROBLEM — E87.1 HYPONATREMIA: Status: RESOLVED | Noted: 2022-11-21 | Resolved: 2024-02-06

## 2024-02-06 PROBLEM — E11.10: Status: RESOLVED | Noted: 2022-11-22 | Resolved: 2024-02-06

## 2024-02-06 PROBLEM — J10.1 INFLUENZA A: Status: RESOLVED | Noted: 2022-11-21 | Resolved: 2024-02-06

## 2024-02-06 PROBLEM — J02.0 ACUTE STREPTOCOCCAL PHARYNGITIS: Status: RESOLVED | Noted: 2022-11-21 | Resolved: 2024-02-06

## 2024-02-06 LAB
ANION GAP SERPL CALCULATED.3IONS-SCNC: 11 MMOL/L (ref 7–15)
BUN SERPL-MCNC: 16 MG/DL (ref 6–20)
CALCIUM SERPL-MCNC: 9.2 MG/DL (ref 8.6–10)
CHLORIDE SERPL-SCNC: 97 MMOL/L (ref 98–107)
CHOLEST SERPL-MCNC: 211 MG/DL
CREAT SERPL-MCNC: 0.74 MG/DL (ref 0.67–1.17)
CREAT UR-MCNC: 99.4 MG/DL
DEPRECATED HCO3 PLAS-SCNC: 27 MMOL/L (ref 22–29)
EGFRCR SERPLBLD CKD-EPI 2021: >90 ML/MIN/1.73M2
ERYTHROCYTE [DISTWIDTH] IN BLOOD BY AUTOMATED COUNT: 13.7 % (ref 10–15)
FASTING STATUS PATIENT QL REPORTED: ABNORMAL
GLUCOSE SERPL-MCNC: 244 MG/DL (ref 70–99)
HBA1C MFR BLD: 9.2 % (ref 0–5.6)
HCT VFR BLD AUTO: 43.8 % (ref 40–53)
HDLC SERPL-MCNC: 51 MG/DL
HGB BLD-MCNC: 15.1 G/DL (ref 13.3–17.7)
LDLC SERPL CALC-MCNC: 107 MG/DL
MCH RBC QN AUTO: 28.7 PG (ref 26.5–33)
MCHC RBC AUTO-ENTMCNC: 34.5 G/DL (ref 31.5–36.5)
MCV RBC AUTO: 83 FL (ref 78–100)
MICROALBUMIN UR-MCNC: <12 MG/L
MICROALBUMIN/CREAT UR: NORMAL MG/G{CREAT}
NONHDLC SERPL-MCNC: 160 MG/DL
PLATELET # BLD AUTO: 150 10E3/UL (ref 150–450)
POTASSIUM SERPL-SCNC: 4.1 MMOL/L (ref 3.4–5.3)
RBC # BLD AUTO: 5.26 10E6/UL (ref 4.4–5.9)
SODIUM SERPL-SCNC: 135 MMOL/L (ref 135–145)
TRIGL SERPL-MCNC: 266 MG/DL
WBC # BLD AUTO: 4.8 10E3/UL (ref 4–11)

## 2024-02-06 PROCEDURE — 82570 ASSAY OF URINE CREATININE: CPT | Performed by: INTERNAL MEDICINE

## 2024-02-06 PROCEDURE — 85027 COMPLETE CBC AUTOMATED: CPT | Performed by: INTERNAL MEDICINE

## 2024-02-06 PROCEDURE — 83036 HEMOGLOBIN GLYCOSYLATED A1C: CPT | Performed by: INTERNAL MEDICINE

## 2024-02-06 PROCEDURE — 82043 UR ALBUMIN QUANTITATIVE: CPT | Performed by: INTERNAL MEDICINE

## 2024-02-06 PROCEDURE — 99214 OFFICE O/P EST MOD 30 MIN: CPT | Performed by: INTERNAL MEDICINE

## 2024-02-06 PROCEDURE — 80048 BASIC METABOLIC PNL TOTAL CA: CPT | Performed by: INTERNAL MEDICINE

## 2024-02-06 PROCEDURE — 80061 LIPID PANEL: CPT | Performed by: INTERNAL MEDICINE

## 2024-02-06 PROCEDURE — 36415 COLL VENOUS BLD VENIPUNCTURE: CPT | Performed by: INTERNAL MEDICINE

## 2024-02-06 RX ORDER — LANCETS
EACH MISCELLANEOUS
Qty: 100 EACH | Refills: 6 | Status: SHIPPED | OUTPATIENT
Start: 2024-02-06

## 2024-02-06 RX ORDER — LOSARTAN POTASSIUM 100 MG/1
100 TABLET ORAL DAILY
Qty: 90 TABLET | Refills: 3 | Status: SHIPPED | OUTPATIENT
Start: 2024-02-06

## 2024-02-06 ASSESSMENT — PAIN SCALES - GENERAL: PAINLEVEL: EXTREME PAIN (8)

## 2024-02-06 NOTE — LETTER
February 6, 2024      Ilya Villagran  [address] [facility] [address] [facility] [address]        Dear ,    We are writing to inform you of your test results.    Electrolytes are normal.  The hemoglobin A1c is 9.4 which corresponds to an average blood sugar of over 250.  Normal blood sugar is .  Recommend to start the metformin as discussed at the time of visit.  Recommend to see diabetes educator as discussed at the time of visit.  Nonfasting cholesterol is elevated and similar to previous values.  We will recheck at our follow-up visit in 3 months.  The blood count is normal.     Urine is negative for protein which is good     Resulted Orders   Basic metabolic panel  (Ca, Cl, CO2, Creat, Gluc, K, Na, BUN)   Result Value Ref Range    Sodium 135 135 - 145 mmol/L      Comment:      Reference intervals for this test were updated on 09/26/2023 to more accurately reflect our healthy population. There may be differences in the flagging of prior results with similar values performed with this method. Interpretation of those prior results can be made in the context of the updated reference intervals.     Potassium 4.1 3.4 - 5.3 mmol/L    Chloride 97 (L) 98 - 107 mmol/L    Carbon Dioxide (CO2) 27 22 - 29 mmol/L    Anion Gap 11 7 - 15 mmol/L    Urea Nitrogen 16.0 6.0 - 20.0 mg/dL    Creatinine 0.74 0.67 - 1.17 mg/dL    GFR Estimate >90 >60 mL/min/1.73m2    Calcium 9.2 8.6 - 10.0 mg/dL    Glucose 244 (H) 70 - 99 mg/dL   Lipid panel reflex to direct LDL Fasting   Result Value Ref Range    Cholesterol 211 (H) <200 mg/dL    Triglycerides 266 (H) <150 mg/dL    Direct Measure HDL 51 >=40 mg/dL    LDL Cholesterol Calculated 107 (H) <=100 mg/dL    Non HDL Cholesterol 160 (H) <130 mg/dL    Patient Fasting > 8hrs? Unknown     Narrative    Cholesterol  Desirable:  <200 mg/dL    Triglycerides  Normal:  Less than 150 mg/dL  Borderline High:  150-199 mg/dL  High:  200-499 mg/dL  Very High:  Greater than or equal to 500  mg/dL    Direct Measure HDL  Female:  Greater than or equal to 50 mg/dL   Male:  Greater than or equal to 40 mg/dL    LDL Cholesterol  Desirable:  <100mg/dL  Above Desirable:  100-129 mg/dL   Borderline High:  130-159 mg/dL   High:  160-189 mg/dL   Very High:  >= 190 mg/dL    Non HDL Cholesterol  Desirable:  130 mg/dL  Above Desirable:  130-159 mg/dL  Borderline High:  160-189 mg/dL  High:  190-219 mg/dL  Very High:  Greater than or equal to 220 mg/dL   Albumin Random Urine Quantitative with Creat Ratio   Result Value Ref Range    Creatinine Urine mg/dL 99.4 mg/dL      Comment:      The reference ranges have not been established in urine creatinine. The results should be integrated into the clinical context for interpretation.    Albumin Urine mg/L <12.0 mg/L      Comment:      The reference ranges have not been established in urine albumin. The results should be integrated into the clinical context for interpretation.    Albumin Urine mg/g Cr        Comment:      Unable to calculate, urine albumin and/or urine creatinine is outside detectable limits.  Microalbuminuria is defined as an albumin:creatinine ratio of 17 to 299 for males and 25 to 299 for females. A ratio of albumin:creatinine of 300 or higher is indicative of overt proteinuria.  Due to biologic variability, positive results should be confirmed by a second, first-morning random or 24-hour timed urine specimen. If there is discrepancy, a third specimen is recommended. When 2 out of 3 results are in the microalbuminuria range, this is evidence for incipient nephropathy and warrants increased efforts at glucose control, blood pressure control, and institution of therapy with an angiotensin-converting-enzyme (ACE) inhibitor (if the patient can tolerate it).     Hemoglobin A1c   Result Value Ref Range    Hemoglobin A1C 9.2 (H) 0.0 - 5.6 %      Comment:      Normal <5.7%   Prediabetes 5.7-6.4%    Diabetes 6.5% or higher     Note: Adopted from ADA consensus  guidelines.   CBC with platelets   Result Value Ref Range    WBC Count 4.8 4.0 - 11.0 10e3/uL    RBC Count 5.26 4.40 - 5.90 10e6/uL    Hemoglobin 15.1 13.3 - 17.7 g/dL    Hematocrit 43.8 40.0 - 53.0 %    MCV 83 78 - 100 fL    MCH 28.7 26.5 - 33.0 pg    MCHC 34.5 31.5 - 36.5 g/dL    RDW 13.7 10.0 - 15.0 %    Platelet Count 150 150 - 450 10e3/uL       If you have any questions or concerns, please call the clinic at the number listed above.       Sincerely,      Carin Pepe, DO

## 2024-02-06 NOTE — PATIENT INSTRUCTIONS
Health Care Maintenance  You are due for dilated eye exam.  Have the report sent to Dr. Pepe's office.  You are due for a colonoscopy.  Please call [phone] to schedule  Recommend flu, pneumonia, COVID, shingles vaccine    Hypertension  Refill of losartan  Schedule RN blood pressure check in 2-4 weeks    Diabetes  Order for new meter  Referral to diabetes ed   Make follow-up appointment with diabetes educator Eleni Theodore [phone]\  Recommend to resume Metformin  Follow-up with me in 3 months.    Mental Health  Refill of sertraline

## 2024-02-06 NOTE — PROGRESS NOTES
Assessment & Plan     Essential hypertension  Resume losartan.  Schedule RN BP check in 2-4 weeks if blood pressure continues to be elevated, would add HCTZ 12.5 mg once daily  - losartan (COZAAR) 100 MG tablet; Take 1 tablet (100 mg) by mouth daily    Type 2 diabetes mellitus without complication, without long-term current use of insulin (H)  Uncontrolled.  Strongly encouraged him to restart metformin.  Attempted to dispel myths that he had about metformin safety.  He has never seen diabetes educator or nutritionist.  Recommend he start by seeing diabetes educator.  Recommend he start checking his blood sugar once daily.  He still is in the denial and does not grasp the seriousness of the diabetes diagnosis.  - Basic metabolic panel  (Ca, Cl, CO2, Creat, Gluc, K, Na, BUN); Future  - Lipid panel reflex to direct LDL Fasting; Future  - Albumin Random Urine Quantitative with Creat Ratio; Future  - Hemoglobin A1c; Future  - CBC with platelets; Future  - metFORMIN (GLUCOPHAGE) 1000 MG tablet; Take 0.5 tablets (500 mg) by mouth daily (with dinner) for 7 days, THEN 0.5 tablets (500 mg) 2 times daily (with meals) for 7 days, THEN 1 tablet (1,000 mg) 2 times daily (with meals) for 360 days.  - Adult Diabetes Education  Referral; Future  - blood glucose monitoring (NO BRAND SPECIFIED) meter device kit; Use to test blood sugar 1 times daily or as directed. Preferred blood glucose meter OR supplies to accompany: Blood Glucose Monitor Brands: per insurance.  - blood glucose (NO BRAND SPECIFIED) test strip; Use to test blood sugar 1 times daily or as directed. To accompany: Blood Glucose Monitor Brands: per insurance.  - thin (NO BRAND SPECIFIED) lancets; Use with lanceting device. To accompany: Blood Glucose Monitor Brands: per insurance.  - Basic metabolic panel  (Ca, Cl, CO2, Creat, Gluc, K, Na, BUN)  - Lipid panel reflex to direct LDL Fasting  - Albumin Random Urine Quantitative with Creat Ratio  - Hemoglobin A1c  -  CBC with platelets  - Hemoglobin A1c; Future  - Lipid panel reflex to direct LDL Fasting; Future    Anxiety and depression  Resume sertraline    Screen for colon cancer  Due   - Colonoscopy Screening  Referral; Future        Patient Instructions   Health Care Maintenance  You are due for dilated eye exam.  Have the report sent to Dr. Pepe's office.  You are due for a colonoscopy.  Please call [phone] to schedule  Recommend flu, pneumonia, COVID, shingles vaccine    Hypertension  Refill of losartan  Schedule RN blood pressure check in 2-4 weeks    Diabetes  Order for new meter  Referral to diabetes ed   Make follow-up appointment with diabetes educator Eleni Theodore [phone]\  Recommend to resume Metformin  Follow-up with me in 3 months.    Mental Health  Refill of sertraline    Vern Caraballo is a 53 year old, presenting for the following health issues:  Hypertension    Patient arrived over 20 minutes late for his appointment.  I have not seen him since 6/2022      2/6/2024     3:33 PM   Additional Questions   Roomed by Shahida NARVAEZ   Accompanied by self     HPI       Hypertension Follow-up    Do you check your blood pressure regularly outside of the clinic? No   Are you following a low salt diet? No, does not add salt  Are your blood pressures ever more than 140 on the top number (systolic) OR more   than 90 on the bottom number (diastolic), for example 140/90?   How many servings of fruits and vegetables do you eat daily?  2-3  On average, how many sweetened beverages do you drink each day (Examples: soda, juice, sweet tea, etc.  Do NOT count diet or artificially sweetened beverages)?   0  How many days per week do you exercise enough to make your heart beat faster? 3 or less  How many minutes a day do you exercise enough to make your heart beat faster? 9 or less  How many days per week do you miss taking your medication? Has been off medication for about 2 months    Diabetes  --he is not  taking metformin or checking his blood sugar   --'heard bad things about metformin - causes cancer or tendon rupture  --he thinks psychologically that if he is on metformin he will not be as good at watching his diet that it will give him free reign to eat what ever sugar he wants    Mental Health  --has been out of sertraline for several months; wants to resume    Current Outpatient Medications   Medication Sig Dispense Refill    acetaminophen (TYLENOL) 500 MG tablet Take 1-2 tablets (500-1,000 mg) by mouth every 6 hours as needed for mild pain      albuterol (PROAIR HFA/PROVENTIL HFA/VENTOLIN HFA) 108 (90 Base) MCG/ACT inhaler Inhale 2 puffs into the lungs every 4 hours as needed for wheezing 18 g     blood glucose (NO BRAND SPECIFIED) lancets standard by In Vitro route 4 times daily (Patient not taking: Reported on 2/6/2024) 100 each 11    blood glucose (NO BRAND SPECIFIED) test strip 1 strip by In Vitro route 4 times daily (Patient not taking: Reported on 2/6/2024) 100 strip 3    blood glucose monitoring (NO BRAND SPECIFIED) meter device kit Use to test blood sugar 1 times daily or as directed. (Patient not taking: Reported on 2/6/2024) 1 kit 0    insulin lispro (HUMALOG KWIKPEN) 100 UNIT/ML (1 unit dial) KWIKPEN Inject 1-10 Units Subcutaneous 3 times daily (before meals) To be given with prandial insulin, and based on pre-meal blood glucose. Do Not give Correction Insulin if Pre-Meal BG less than 140. For Pre-Meal  - 164 give 1 unit. For Pre-Meal  - 189 give 2 units. For Pre-Meal  - 214 give 3 units. For Pre-Meal  - 239 give 4 units. For Pre-Meal  - 264 give 5 units. For Pre-Meal  - 289 give 6 units. For Pre-Meal  - 314 give 7 units. For Pre-Meal  - 339 give 8 units. For Pre-Meal  - 364 give 9 units.  For Pre-Meal BG greater than or equal to 365 give 10 units (Patient not taking: Reported on 2/2/2024) 15 mL 2    insulin lispro (HUMALOG KWIKPEN) 100 UNIT/ML  (1 unit dial) KWIKPEN Inject 1-7 Units Subcutaneous At Bedtime HIGH INSULIN RESISTANCE DOSING    Do Not give Bedtime Correction Insulin if BG less than 200.   For  - 224 give 1 units.   For  - 249 give 2 units.   For  - 274 give 3 units.   For  - 299 give 4 units.   For  - 324 give 5 units.   For  - 349 give 6 units.   For BG greater than or equal to 350 give 7 units. (Patient not taking: Reported on 2/2/2024)      insulin pen needle (30G X 8 MM) 30G X 8 MM miscellaneous Use 4 pen needles daily or as directed. (Patient not taking: Reported on 2/6/2024) 100 each 0    losartan (COZAAR) 100 MG tablet Take 1 tablet (100 mg) by mouth daily (Patient not taking: Reported on 2/2/2024) 90 tablet 3    metFORMIN (GLUCOPHAGE) 1000 MG tablet Take 1 tablet (1,000 mg) by mouth 2 times daily (with meals) Please follow up with primary care provider for further refills. (Patient not taking: Reported on 2/2/2024) 60 tablet 0    metFORMIN (GLUCOPHAGE) 1000 MG tablet Take 1 tablet (1,000 mg) by mouth daily (with dinner) for 1 dose 1 tablet 0    sertraline (ZOLOFT) 50 MG tablet TAKE 1 TABLET (50 MG) BY MOUTH DAILY (Patient not taking: Reported on 2/2/2024) 90 tablet 0           Review of Systems  Constitutional, HEENT, cardiovascular, pulmonary, gi and gu systems are negative, except as otherwise noted.      Objective    BP (!) 188/106 (BP Location: Right arm, Patient Position: Chair, Cuff Size: Adult Large)   Pulse 89   Temp 98.8  F (37.1  C) (Tympanic)   Resp 16   Ht 1.829 m (6')   SpO2 96%   BMI 29.57 kg/m    Body mass index is 29.57 kg/m .  Physical Exam   GENERAL: alert and no distress  RESP: lungs clear to auscultation - no rales, rhonchi or wheezes  CV: regular rate and rhythm, normal S1 S2, no S3 or S4, no murmur, click or rub, no peripheral edema     Results for orders placed or performed in visit on 02/06/24 (from the past 24 hour(s))   Basic metabolic panel  (Ca, Cl, CO2, Creat, Gluc,  K, Na, BUN)   Result Value Ref Range    Sodium 135 135 - 145 mmol/L    Potassium 4.1 3.4 - 5.3 mmol/L    Chloride 97 (L) 98 - 107 mmol/L    Carbon Dioxide (CO2) 27 22 - 29 mmol/L    Anion Gap 11 7 - 15 mmol/L    Urea Nitrogen 16.0 6.0 - 20.0 mg/dL    Creatinine 0.74 0.67 - 1.17 mg/dL    GFR Estimate >90 >60 mL/min/1.73m2    Calcium 9.2 8.6 - 10.0 mg/dL    Glucose 244 (H) 70 - 99 mg/dL   Lipid panel reflex to direct LDL Fasting   Result Value Ref Range    Cholesterol 211 (H) <200 mg/dL    Triglycerides 266 (H) <150 mg/dL    Direct Measure HDL 51 >=40 mg/dL    LDL Cholesterol Calculated 107 (H) <=100 mg/dL    Non HDL Cholesterol 160 (H) <130 mg/dL    Patient Fasting > 8hrs? Unknown     Narrative    Cholesterol  Desirable:  <200 mg/dL    Triglycerides  Normal:  Less than 150 mg/dL  Borderline High:  150-199 mg/dL  High:  200-499 mg/dL  Very High:  Greater than or equal to 500 mg/dL    Direct Measure HDL  Female:  Greater than or equal to 50 mg/dL   Male:  Greater than or equal to 40 mg/dL    LDL Cholesterol  Desirable:  <100mg/dL  Above Desirable:  100-129 mg/dL   Borderline High:  130-159 mg/dL   High:  160-189 mg/dL   Very High:  >= 190 mg/dL    Non HDL Cholesterol  Desirable:  130 mg/dL  Above Desirable:  130-159 mg/dL  Borderline High:  160-189 mg/dL  High:  190-219 mg/dL  Very High:  Greater than or equal to 220 mg/dL   Hemoglobin A1c   Result Value Ref Range    Hemoglobin A1C 9.2 (H) 0.0 - 5.6 %   CBC with platelets   Result Value Ref Range    WBC Count 4.8 4.0 - 11.0 10e3/uL    RBC Count 5.26 4.40 - 5.90 10e6/uL    Hemoglobin 15.1 13.3 - 17.7 g/dL    Hematocrit 43.8 40.0 - 53.0 %    MCV 83 78 - 100 fL    MCH 28.7 26.5 - 33.0 pg    MCHC 34.5 31.5 - 36.5 g/dL    RDW 13.7 10.0 - 15.0 %    Platelet Count 150 150 - 450 10e3/uL   Albumin Random Urine Quantitative with Creat Ratio   Result Value Ref Range    Creatinine Urine mg/dL 99.4 mg/dL    Albumin Urine mg/L <12.0 mg/L    Albumin Urine mg/g Cr             Signed  Electronically by: Carin Pepe, DO

## 2024-02-06 NOTE — RESULT ENCOUNTER NOTE
Electrolytes are normal.  The hemoglobin A1c is 9.4 which corresponds to an average blood sugar of over 250.  Normal blood sugar is .  Recommend to start the metformin as discussed at the time of visit.  Recommend to see diabetes educator as discussed at the time of visit.  Nonfasting cholesterol is elevated and similar to previous values.  We will recheck at our follow-up visit in 3 months.  The blood count is normal.

## 2024-02-23 ENCOUNTER — ALLIED HEALTH/NURSE VISIT (OUTPATIENT)
Dept: FAMILY MEDICINE | Facility: CLINIC | Age: 54
End: 2024-02-23
Payer: COMMERCIAL

## 2024-02-23 ENCOUNTER — TELEPHONE (OUTPATIENT)
Dept: FAMILY MEDICINE | Facility: CLINIC | Age: 54
End: 2024-02-23

## 2024-02-23 VITALS
HEART RATE: 84 BPM | BODY MASS INDEX: 32.63 KG/M2 | DIASTOLIC BLOOD PRESSURE: 90 MMHG | SYSTOLIC BLOOD PRESSURE: 140 MMHG | WEIGHT: 240.6 LBS

## 2024-02-23 DIAGNOSIS — I10 ESSENTIAL HYPERTENSION: Primary | ICD-10-CM

## 2024-02-23 PROCEDURE — 99207 PR NO CHARGE NURSE ONLY: CPT

## 2024-02-23 NOTE — TELEPHONE ENCOUNTER
Ilya Villagran is a 53 year old year old patient who comes in today for a Blood Pressure check because pt resumed losartan, 100 mg daily, when he saw Dr Pepe on 2/6/24.     Vital Signs as repeated by RN:  144/82, pulse 84  Rechecked 10 min later and BP is 140/90.     Patient is taking medication as prescribed.  Patient is tolerating medications well.    Patient is not monitoring Blood Pressure at home.  However, pt is considering purchasing a home BP monitor.       Additional discussion regarding blood sugar management.  Pt shares that he has been making improvements in his diet.  He intends to make appt to see diabetic ed as previously advised.  Pt has not started checking blood sugars but intends to start checking these as well.  Pt did resume metformin but is taking 1/2 tab a day at this time and has not titrated up yet.     Current complaints: none  Disposition:  Routed to provider for further instructions.    Linda Madison RN

## 2024-02-23 NOTE — PROGRESS NOTES
Ilya Villagran is a 53 year old year old patient who comes in today for a Blood Pressure check because pt resumed losartan, 100 mg daily.    Vital Signs as repeated by RN:  144/82, pulse 84  Rechecked 10 min later and BP is 140/90.    Patient is taking medication as prescribed.  Patient is tolerating medications well.    Patient is not monitoring Blood Pressure at home.  However, pt is considering purchasing a home BP monitor.      Additional discussion regarding blood sugar management.  Pt shares that he has been making improvements in his diet.  He intends to make appt to see diabetic ed as previously advised.  Pt has not started checking blood sugars but intends to start checking these as well.  Pt did resume metformin but is taking 1/2 tab a day at this time and has not titrated up yet.    Current complaints: none  Disposition:  Routed to provider for further instructions.

## 2024-02-26 RX ORDER — HYDROCHLOROTHIAZIDE 12.5 MG/1
12.5 TABLET ORAL DAILY
Qty: 90 TABLET | Refills: 3 | Status: SHIPPED | OUTPATIENT
Start: 2024-02-26

## 2024-02-26 NOTE — TELEPHONE ENCOUNTER
Blood pressure is improved but still not controlled. Continue losartan. add hydrochlorothiazide 12.5 mg once daily; agree with plan for home blood pressure cuff, diabetes ed, checking blood sugar     Recommend RN blood pressure check 2 weeks, nonfasting BMP too

## 2024-02-27 NOTE — TELEPHONE ENCOUNTER
Left message on answering machine for patient to call back.     Michelle LARSON RN  [facility]  [phone]

## 2024-02-29 NOTE — TELEPHONE ENCOUNTER
4th attempt.  Left detailed voicemail with PCP's instructions below and advised call back with questions/concerns.    Kelsea Herron RN  [facility]

## 2024-03-19 ENCOUNTER — TELEPHONE (OUTPATIENT)
Dept: FAMILY MEDICINE | Facility: CLINIC | Age: 54
End: 2024-03-19
Payer: COMMERCIAL

## 2024-03-19 NOTE — TELEPHONE ENCOUNTER
Patient Quality Outreach    Patient is due for the following:   Hypertension -  BP check  Colon Cancer Screening  Physical Preventive Adult Physical    Next Steps:   Schedule a nurse only visit for BP    Type of outreach:    Sent letter.    Next Steps:  Reach out within 90 days via Letter.    Max number of attempts reached: Yes. Will try again in 90 days if patient still on fail list.    Questions for provider review:    None           Estela Sinha MA  Chart routed to .

## 2024-05-17 ENCOUNTER — TELEPHONE (OUTPATIENT)
Dept: FAMILY MEDICINE | Facility: CLINIC | Age: 54
End: 2024-05-17
Payer: COMMERCIAL

## 2024-05-17 NOTE — LETTER
May 20, 2024      Ilya Villagran  [address] [address]        Dear Ilya,         Your team at [facility] cares about your health. We have reviewed your chart and based on our findings; we are making the following recommendations to better manage your health.     You are in particular need of attention regarding the following:     Schedule a DIABETIC FOLLOW UP appointment for Office Visit. Patients with diabetes should see their provider regularly.       Patient is due for a diabetes check up.  They are due for  non-fasting blood work.  A1C     Orders were placed, please have lab work done a day or so before visit with me.  Please remind them to bring meter to visit so I can review blood sugar data.Please call [phone] to schedule lab and office visit .    If you have already completed these items, please contact the clinic via phone or   [facility] so your care team can review and update your records. Thank you for   choosing [facility] for your healthcare needs. For any questions,   concerns, or to schedule an appointment please contact our clinic.    Sincerely,        Carin Pepe, DO

## 2024-05-17 NOTE — TELEPHONE ENCOUNTER
Patient Quality Outreach    Patient is due for the following:   Diabetes -  A1C, Eye Exam, Diabetic Follow-Up Visit, and Foot Exam    Next Steps:   Schedule a lab only visit for diabetic labs office visit for diabetes follow up     Type of outreach:    Phone, left message for patient/parent to call back.  Will postpone and try back in a few days if patient has not called and scheduled an appointment yet.     Questions for provider review:    None           Sarah LARSON LPN

## 2024-05-17 NOTE — TELEPHONE ENCOUNTER
Panel Management:    Patient is due for a diabetes check up.  They are due for  non-fasting blood work.  A1C    Orders were placed, please have lab work done a day or so before visit with me.  Please remind them to bring meter to visit so I can review blood sugar data.

## 2024-07-30 ENCOUNTER — TELEPHONE (OUTPATIENT)
Dept: FAMILY MEDICINE | Facility: CLINIC | Age: 54
End: 2024-07-30

## 2024-07-30 NOTE — TELEPHONE ENCOUNTER
Panel Management:    Patient is due for a diabetes check up.  They are due for fasting blood work.      Orders were placed, please have lab work done a day or so before visit with me.  Please remind them to bring meter to visit so I can review blood sugar data.

## 2024-09-17 ENCOUNTER — TELEPHONE (OUTPATIENT)
Dept: FAMILY MEDICINE | Facility: CLINIC | Age: 54
End: 2024-09-17
Payer: COMMERCIAL

## 2024-09-17 NOTE — TELEPHONE ENCOUNTER
Patient Quality Outreach    Patient is due for the following:   Diabetes -  A1C and Diabetic Follow-Up Visit  Hypertension -  Hypertension follow-up visit  Colon Cancer Screening  Physical Preventive Adult Physical    Next Steps:   Schedule a Adult Preventative    Type of outreach:    Sent letter.    Next Steps:  Reach out within 90 days via Letter.    Max number of attempts reached: Yes. Will try again in 90 days if patient still on fail list.    Questions for provider review:    None           Estela Sinha MA  Chart routed to .

## 2024-10-04 ENCOUNTER — OFFICE VISIT (OUTPATIENT)
Dept: FAMILY MEDICINE | Facility: CLINIC | Age: 54
End: 2024-10-04
Payer: COMMERCIAL

## 2024-10-04 ENCOUNTER — ANCILLARY PROCEDURE (OUTPATIENT)
Dept: GENERAL RADIOLOGY | Facility: CLINIC | Age: 54
End: 2024-10-04
Attending: NURSE PRACTITIONER
Payer: COMMERCIAL

## 2024-10-04 ENCOUNTER — LAB (OUTPATIENT)
Dept: LAB | Facility: CLINIC | Age: 54
End: 2024-10-04
Payer: COMMERCIAL

## 2024-10-04 VITALS
SYSTOLIC BLOOD PRESSURE: 132 MMHG | HEIGHT: 73 IN | TEMPERATURE: 99 F | WEIGHT: 240.3 LBS | BODY MASS INDEX: 31.85 KG/M2 | OXYGEN SATURATION: 95 % | HEART RATE: 82 BPM | RESPIRATION RATE: 24 BRPM | DIASTOLIC BLOOD PRESSURE: 86 MMHG

## 2024-10-04 DIAGNOSIS — E11.9 TYPE 2 DIABETES MELLITUS WITHOUT COMPLICATION, WITHOUT LONG-TERM CURRENT USE OF INSULIN (H): ICD-10-CM

## 2024-10-04 DIAGNOSIS — M25.562 CHRONIC PAIN OF LEFT KNEE: Primary | ICD-10-CM

## 2024-10-04 DIAGNOSIS — G89.29 CHRONIC PAIN OF LEFT KNEE: ICD-10-CM

## 2024-10-04 DIAGNOSIS — I10 ESSENTIAL HYPERTENSION: ICD-10-CM

## 2024-10-04 DIAGNOSIS — E11.65 UNCONTROLLED TYPE 2 DIABETES MELLITUS WITH HYPERGLYCEMIA (H): Primary | ICD-10-CM

## 2024-10-04 DIAGNOSIS — G89.29 CHRONIC PAIN OF LEFT KNEE: Primary | ICD-10-CM

## 2024-10-04 DIAGNOSIS — M25.562 CHRONIC PAIN OF LEFT KNEE: ICD-10-CM

## 2024-10-04 LAB
ANION GAP SERPL CALCULATED.3IONS-SCNC: 13 MMOL/L (ref 7–15)
BUN SERPL-MCNC: 13.1 MG/DL (ref 6–20)
CALCIUM SERPL-MCNC: 8.8 MG/DL (ref 8.8–10.4)
CHLORIDE SERPL-SCNC: 97 MMOL/L (ref 98–107)
CHOLEST SERPL-MCNC: 204 MG/DL
CREAT SERPL-MCNC: 0.79 MG/DL (ref 0.67–1.17)
EGFRCR SERPLBLD CKD-EPI 2021: >90 ML/MIN/1.73M2
EST. AVERAGE GLUCOSE BLD GHB EST-MCNC: 192 MG/DL
FASTING STATUS PATIENT QL REPORTED: YES
FASTING STATUS PATIENT QL REPORTED: YES
GLUCOSE SERPL-MCNC: 205 MG/DL (ref 70–99)
HBA1C MFR BLD: 8.3 % (ref 0–5.6)
HCO3 SERPL-SCNC: 23 MMOL/L (ref 22–29)
HDLC SERPL-MCNC: 47 MG/DL
LDLC SERPL CALC-MCNC: 107 MG/DL
NONHDLC SERPL-MCNC: 157 MG/DL
POTASSIUM SERPL-SCNC: 4.3 MMOL/L (ref 3.4–5.3)
SODIUM SERPL-SCNC: 133 MMOL/L (ref 135–145)
TRIGL SERPL-MCNC: 252 MG/DL

## 2024-10-04 PROCEDURE — 36415 COLL VENOUS BLD VENIPUNCTURE: CPT

## 2024-10-04 PROCEDURE — 73562 X-RAY EXAM OF KNEE 3: CPT | Mod: TC | Performed by: RADIOLOGY

## 2024-10-04 PROCEDURE — 80061 LIPID PANEL: CPT

## 2024-10-04 PROCEDURE — 99214 OFFICE O/P EST MOD 30 MIN: CPT | Performed by: NURSE PRACTITIONER

## 2024-10-04 PROCEDURE — 80048 BASIC METABOLIC PNL TOTAL CA: CPT

## 2024-10-04 PROCEDURE — 83036 HEMOGLOBIN GLYCOSYLATED A1C: CPT

## 2024-10-04 ASSESSMENT — PAIN SCALES - GENERAL: PAINLEVEL: MILD PAIN (2)
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
## 2024-10-11 ENCOUNTER — OFFICE VISIT (OUTPATIENT)
Dept: FAMILY MEDICINE | Facility: CLINIC | Age: 54
End: 2024-10-11
Payer: COMMERCIAL

## 2024-10-11 VITALS
SYSTOLIC BLOOD PRESSURE: 126 MMHG | BODY MASS INDEX: 31.94 KG/M2 | TEMPERATURE: 97.9 F | HEART RATE: 79 BPM | OXYGEN SATURATION: 97 % | DIASTOLIC BLOOD PRESSURE: 80 MMHG | RESPIRATION RATE: 16 BRPM | HEIGHT: 73 IN | WEIGHT: 241 LBS

## 2024-10-11 DIAGNOSIS — Z12.11 SCREEN FOR COLON CANCER: ICD-10-CM

## 2024-10-11 DIAGNOSIS — E11.9 TYPE 2 DIABETES MELLITUS WITHOUT COMPLICATION, WITHOUT LONG-TERM CURRENT USE OF INSULIN (H): Primary | ICD-10-CM

## 2024-10-11 DIAGNOSIS — I10 ESSENTIAL HYPERTENSION: ICD-10-CM

## 2024-10-11 PROBLEM — E11.65 UNCONTROLLED TYPE 2 DIABETES MELLITUS WITH HYPERGLYCEMIA (H): Status: RESOLVED | Noted: 2022-11-21 | Resolved: 2024-10-11

## 2024-10-11 PROCEDURE — 99214 OFFICE O/P EST MOD 30 MIN: CPT | Performed by: INTERNAL MEDICINE

## 2024-10-11 PROCEDURE — G2211 COMPLEX E/M VISIT ADD ON: HCPCS | Performed by: INTERNAL MEDICINE

## 2024-10-11 ASSESSMENT — ANXIETY QUESTIONNAIRES
GAD7 TOTAL SCORE: 0
GAD7 TOTAL SCORE: 0
6. BECOMING EASILY ANNOYED OR IRRITABLE: NOT AT ALL
3. WORRYING TOO MUCH ABOUT DIFFERENT THINGS: NOT AT ALL
7. FEELING AFRAID AS IF SOMETHING AWFUL MIGHT HAPPEN: NOT AT ALL
IF YOU CHECKED OFF ANY PROBLEMS ON THIS QUESTIONNAIRE, HOW DIFFICULT HAVE THESE PROBLEMS MADE IT FOR YOU TO DO YOUR WORK, TAKE CARE OF THINGS AT HOME, OR GET ALONG WITH OTHER PEOPLE: NOT DIFFICULT AT ALL
2. NOT BEING ABLE TO STOP OR CONTROL WORRYING: NOT AT ALL
5. BEING SO RESTLESS THAT IT IS HARD TO SIT STILL: NOT AT ALL
1. FEELING NERVOUS, ANXIOUS, OR ON EDGE: NOT AT ALL

## 2024-10-11 ASSESSMENT — PATIENT HEALTH QUESTIONNAIRE - PHQ9
5. POOR APPETITE OR OVEREATING: NOT AT ALL
SUM OF ALL RESPONSES TO PHQ QUESTIONS 1-9: 3

## 2024-10-11 ASSESSMENT — PAIN SCALES - GENERAL: PAINLEVEL: SEVERE PAIN (6)

## 2024-10-11 NOTE — PATIENT INSTRUCTIONS
Health Care Maintenance  You are due for a colonoscopy.  Please call [phone] to schedule  You are due for dilated eye exam.  Have the report sent to Dr. Pepe's office.    Knee pain  Continue with plan for MRI of the knee as recommended by Sarah Barriga  Radiology test was ordered.  Please call [phone] to schedule.    Diabetes  We dicussed metformin and ozempic but you wanted to give lifestyle changes more time  Follow-up in 3-4 months with fasting blood work prior to visit  We discussed that all people with diabetes should be on a statin. You want to work on lifestyle changes for now        Tips for a Healthy Diet    Add more fresh fruits and vegetables to your diet.  The more colorful with the fruit or vegetable (think berries, spinach, carrots, peppers) the healthier it tends to be.  Juice is not a fruit.  Prepare the vegetables in a healthy way - steam, bake.  Avoid breading, butter/oil to cook.  Use herbs and spices instead of salt to season food.  Add more fiber to your diet.  Swap out white bread, white rice, white pasta for whole grain versions.  Reduce the portion size and frequency of carbohydrates/starches.   Choose healthier fats such as nuts, olive oil, avocado, etc. Stay away from lard, butter.  Decrease the frequency and portion size of 'junk food' -pizza, candy, cookies, potato chips, etc.  Watch liquid calories such as coffee drinks, juice, soda, teas.  There tends to be excessive sugar in these beverages.  Increase protein in your diet.  Eggs, cheese, Greek yogurt, lentils, chicken, fish, seafood, are good healthy choices.  Protein keeps you albarran longer, and you are less likely to have blood sugar spikes  Eat healthy at least 80% of the time.  It is ok for a special treat every once in a while, just not every day.  When are you going to indulge (think [employer] time), be sure you are eating extra healthy the day before and after.      Resources:  [place] Resources for Health and  Wellness:https://www.takingcharge.cs.[facility]/dig-yes-foods    2. Book - Atomic Habits by John Dover.  This a good book that looks into our habits and how to sustain good healthy habits and get rid of bad habits.

## 2024-10-11 NOTE — PROGRESS NOTES
"  Assessment & Plan   Problem List Items Addressed This Visit       Essential hypertension    Relevant Orders    Basic metabolic panel    Type 2 diabetes mellitus without complication, without long-term current use of insulin (H) - Primary    Relevant Orders    OPTOMETRY REFERRAL    Hemoglobin A1c    Lipid panel reflex to direct LDL Fasting    PRIMARY CARE FOLLOW-UP SCHEDULING     Other Visit Diagnoses       Screen for colon cancer        Relevant Orders    Colonoscopy Screening  Referral                  BMI  Estimated body mass index is 32.22 kg/m  as calculated from the following:    Height as of this encounter: 1.842 m (6' 0.52\").    Weight as of this encounter: 109.3 kg (241 lb).   Weight management plan: Discussed healthy diet and exercise guidelines    Patient Instructions   Health Care Maintenance  You are due for a colonoscopy.  Please call [phone] to schedule  You are due for dilated eye exam.  Have the report sent to Dr. Pepe's office.    Knee pain  Continue with plan for MRI of the knee as recommended by Sarah Barriga  Radiology test was ordered.  Please call [phone] to schedule.    Diabetes  We dicussed metformin and ozempic but you wanted to give lifestyle changes more time  Follow-up in 3-4 months with fasting blood work prior to visit  We discussed that all people with diabetes should be on a statin. You want to work on lifestyle changes for now        Tips for a Healthy Diet    Add more fresh fruits and vegetables to your diet.  The more colorful with the fruit or vegetable (think berries, spinach, carrots, peppers) the healthier it tends to be.  Juice is not a fruit.  Prepare the vegetables in a healthy way - steam, bake.  Avoid breading, butter/oil to cook.  Use herbs and spices instead of salt to season food.  Add more fiber to your diet.  Swap out white bread, white rice, white pasta for whole grain versions.  Reduce the portion size and frequency of " carbohydrates/starches.   Choose healthier fats such as nuts, olive oil, avocado, etc. Stay away from lard, butter.  Decrease the frequency and portion size of 'junk food' -pizza, candy, cookies, potato chips, etc.  Watch liquid calories such as coffee drinks, juice, soda, teas.  There tends to be excessive sugar in these beverages.  Increase protein in your diet.  Eggs, cheese, Greek yogurt, lentils, chicken, fish, seafood, are good healthy choices.  Protein keeps you albarran longer, and you are less likely to have blood sugar spikes  Eat healthy at least 80% of the time.  It is ok for a special treat every once in a while, just not every day.  When are you going to indulge (think [employer] time), be sure you are eating extra healthy the day before and after.      Resources:  [place] [employer] [facility] [employer] [facility] [employer]:[email or web address]/dig-yes-foods    2. Book - Atomic Habits by John Dover.  This a good book that looks into our habits and how to sustain good healthy habits and get rid of bad habits.        Vern Caraballo is a 54 year old, presenting for the following health issues:  Hypertension, Diabetes, Depression, Anxiety, and Health Maintenance (No shots )        10/11/2024    10:37 AM   Additional Questions   Roomed by yari   Accompanied by self         10/11/2024    10:37 AM   Patient Reported Additional Medications   Patient reports taking the following new medications none     Chief Complaint   Patient presents with    Hypertension    Diabetes    Depression    Anxiety    Health Maintenance     No shots        Diabetes Follow-up    How often are you checking your blood sugar? Not at all  What concerns do you have today about your diabetes? None   Do you have any of these symptoms? (Select all that apply)  Numbness in feet, Burning in feet, Excessive thirst, and Blurry vision  Have you had a diabetic eye exam in the last 12 months? No  I saw him last in February and his A1c was 9.4  then.  He was uncomfortable with metformin as he had heard/read bad things about the medication.  He has never seen diabetes educator or nutritionist.  I recommended this at our last visit but he declined to schedule  he thinks psychologically that if he is on metformin he will not be as good at watching his diet that it will give him free reign to eat what ever sugar he wants   Hard to adhere to healthy diet  Drinks 20 beers/week; knows there is room for improvement in diet  Joined a gym  He still strongly prefers to not take metformin because of bad side effects         BP Readings from Last 2 Encounters:   10/11/24 126/80   10/04/24 132/86     Hemoglobin A1C (%)   Date Value   10/04/2024 8.3 (H)   02/06/2024 9.2 (H)   02/13/2020 6.7 (H)   09/28/2018 5.5     LDL Cholesterol Calculated (mg/dL)   Date Value   10/04/2024 107 (H)   02/06/2024 107 (H)   04/27/2018 127 (H)     LDL Cholesterol Direct (mg/dL)   Date Value   02/13/2020 122 (H)             Hypertension Follow-up    Do you check your blood pressure regularly outside of the clinic? No   Are you following a low salt diet? No  Are your blood pressures ever more than 140 on the top number (systolic) OR more than 90 on the bottom number (diastolic), for example 140/90? No    Depression and Anxiety   How are you doing with your depression since your last visit? No change  How are you doing with your anxiety since your last visit?  No change  Are you having other symptoms that might be associated with depression or anxiety? No  Have you had a significant life event? No   Do you have any concerns with your use of alcohol or other drugs? No  - at our last visit in February he had been out of sertraline for several months and I resumed it then    Knee pain  --saw my partner last week and MRI was recommended  --right foot falls asleep at times  --going to chiro, getting taping of the knee which is helpful  --knee pain improves with rest, on the weekends; works 50+  hrs/week    Social History     Tobacco Use    Smoking status: Never    Smokeless tobacco: Never   Vaping Use    Vaping status: Never Used   Substance Use Topics    Alcohol use: Yes     Comment: 12 pack per week    Drug use: Not Currently     Comment: used in the past for 15 yrs         11/29/2022    10:50 AM 2/2/2024     3:51 PM 10/11/2024    11:38 AM   PHQ   PHQ-9 Total Score 8 6 3   Q9: Thoughts of better off dead/self-harm past 2 weeks Not at all Not at all Not at all         11/30/2020    12:14 PM 2/2/2024     3:51 PM 10/11/2024    11:38 AM   KIKE-7 SCORE   Total Score 5 2 0         10/11/2024    11:38 AM   Last PHQ-9   1.  Little interest or pleasure in doing things 1   2.  Feeling down, depressed, or hopeless 1   3.  Trouble falling or staying asleep, or sleeping too much 0   4.  Feeling tired or having little energy 1   5.  Poor appetite or overeating 0   6.  Feeling bad about yourself 0   7.  Trouble concentrating 0   8.  Moving slowly or restless 0   Q9: Thoughts of better off dead/self-harm past 2 weeks 0   PHQ-9 Total Score 3   Difficulty at work, home, or with people Not difficult at all         10/11/2024    11:38 AM   KIKE-7    1. Feeling nervous, anxious, or on edge 0   2. Not being able to stop or control worrying 0   3. Worrying too much about different things 0   4. Trouble relaxing 0   5. Being so restless that it is hard to sit still 0   6. Becoming easily annoyed or irritable 0   7. Feeling afraid, as if something awful might happen 0   KIKE-7 Total Score 0   If you checked any problems, how difficult have they made it for you to do your work, take care of things at home, or get along with other people? Not difficult at all       Suicide Assessment Five-step Evaluation and Treatment (SAFE-T)    How many servings of fruits and vegetables do you eat daily?  2-3  On average, how many sweetened beverages do you drink each day (Examples: soda, juice, sweet tea, etc.  Do NOT count diet or artificially  "sweetened beverages)?   0  How many days per week do you exercise enough to make your heart beat faster? 0  How many minutes a day do you exercise enough to make your heart beat faster? 0  How many days per week do you miss taking your medication? 0        Current Outpatient Medications   Medication Sig Dispense Refill    acetaminophen (TYLENOL) 500 MG tablet Take 1-2 tablets (500-1,000 mg) by mouth every 6 hours as needed for mild pain      blood glucose (NO BRAND SPECIFIED) lancets standard by In Vitro route 4 times daily 100 each 11    blood glucose (NO BRAND SPECIFIED) test strip Use to test blood sugar 1 times daily or as directed. To accompany: Blood Glucose Monitor Brands: per insurance. 100 strip 6    blood glucose (NO BRAND SPECIFIED) test strip 1 strip by In Vitro route 4 times daily 100 strip 3    blood glucose monitoring (NO BRAND SPECIFIED) meter device kit Use to test blood sugar 1 times daily or as directed. Preferred blood glucose meter OR supplies to accompany: Blood Glucose Monitor Brands: per insurance. 1 kit 0    blood glucose monitoring (NO BRAND SPECIFIED) meter device kit Use to test blood sugar 1 times daily or as directed. 1 kit 0    losartan (COZAAR) 100 MG tablet Take 1 tablet (100 mg) by mouth daily 90 tablet 3    sertraline (ZOLOFT) 50 MG tablet Take 1 tablet (50 mg) by mouth daily 90 tablet 3    thin (NO BRAND SPECIFIED) lancets Use with lanceting device. To accompany: Blood Glucose Monitor Brands: per insurance. 100 each 6           Review of Systems  Constitutional, neuro, ENT, endocrine, pulmonary, cardiac, gastrointestinal, genitourinary, musculoskeletal, integument and psychiatric systems are negative, except as otherwise noted.      Objective    /80 (BP Location: Left arm, Patient Position: Sitting, Cuff Size: Adult Large)   Pulse 79   Temp 97.9  F (36.6  C) (Tympanic)   Resp 16   Ht 1.842 m (6' 0.52\")   Wt 109.3 kg (241 lb)   SpO2 97%   BMI 32.22 kg/m    Body mass " index is 32.22 kg/m .  Physical Exam   GENERAL: alert and no distress            Signed Electronically by: Carin Pepe DO

## 2025-02-05 ENCOUNTER — TELEPHONE (OUTPATIENT)
Dept: FAMILY MEDICINE | Facility: CLINIC | Age: 55
End: 2025-02-05
Payer: COMMERCIAL

## 2025-02-05 NOTE — LETTER
February 5, 2025    To  Ilya Villagran  [address] [facility] [id number]    Your team at [facility] cares about your health. We have reviewed your chart and based on our findings; we are making the following recommendations to better manage your health.     You are in particular need of attention regarding the following:     Call or MyChart message your clinic to schedule a colonoscopy, schedule/ a FIT Test, or order a Cologuard test. If you are unsure what type of test you need, please call your clinic and speak to clinic staff.   Colon cancer is now the second leading cause of cancer-related deaths in the [place] for both men and women and there are over 130,000 new cases and 50,000 deaths per year from colon cancer. Colonoscopies can prevent 90-95% of these deaths. Problem lesions can be removed before they ever become cancer. This test is not only looking for cancer, but also getting rid of precancerous lesions.   PREVENTATIVE VISIT: Physical    If you have already completed these items, please contact the clinic via phone or   MyChart so your care team can review and update your records. Thank you for   choosing [facility] for your healthcare needs. For any questions,   concerns, or to schedule an appointment please contact our clinic.    Healthy Regards,      Your [facility] Care Team            Electronically signed

## 2025-02-05 NOTE — TELEPHONE ENCOUNTER
Patient Quality Outreach    Patient is due for the following:   Colon Cancer Screening    Action(s) Taken:   Schedule a Adult Preventative    Type of outreach:    Sent letter.    Questions for provider review:    None           Valencia Martinez CMA  Chart routed to none.

## 2025-03-22 DIAGNOSIS — I10 ESSENTIAL HYPERTENSION: ICD-10-CM

## 2025-03-22 DIAGNOSIS — F32.A ANXIETY AND DEPRESSION: ICD-10-CM

## 2025-03-22 DIAGNOSIS — F41.9 ANXIETY AND DEPRESSION: ICD-10-CM

## 2025-03-24 RX ORDER — LOSARTAN POTASSIUM 100 MG/1
100 TABLET ORAL DAILY
Qty: 90 TABLET | Refills: 1 | Status: SHIPPED | OUTPATIENT
Start: 2025-03-24

## 2025-03-24 NOTE — TELEPHONE ENCOUNTER
GFR Estimate   Date Value Ref Range Status   10/04/2024 >90 >60 mL/min/1.73m2 Final     Comment:     eGFR calculated using 2021 CKD-EPI equation.   02/13/2020 >90 >60 mL/min/[1.73_m2] Final     Comment:     Non  GFR Calc  Starting 12/18/2018, serum creatinine based estimated GFR (eGFR) will be   calculated using the Chronic Kidney Disease Epidemiology Collaboration   (CKD-EPI) equation.       Potassium   Date Value Ref Range Status   10/04/2024 4.3 3.4 - 5.3 mmol/L Final     Comment:     Specimen slightly hemolyzed. The reported potassium value may be falsely elevated. Analysis of a non-hemolyzed specimen (i.e. re-draw) may result in a lower potassium value.   11/24/2022 4.4 3.4 - 5.3 mmol/L Final   02/13/2020 4.1 3.4 - 5.3 mmol/L Final

## 2025-08-06 ENCOUNTER — TELEPHONE (OUTPATIENT)
Dept: FAMILY MEDICINE | Facility: CLINIC | Age: 55
End: 2025-08-06
Payer: COMMERCIAL

## 2025-08-06 DIAGNOSIS — E11.9 TYPE 2 DIABETES MELLITUS WITHOUT COMPLICATION, WITHOUT LONG-TERM CURRENT USE OF INSULIN (H): Primary | ICD-10-CM
